# Patient Record
Sex: FEMALE | Race: WHITE
[De-identification: names, ages, dates, MRNs, and addresses within clinical notes are randomized per-mention and may not be internally consistent; named-entity substitution may affect disease eponyms.]

---

## 2018-04-20 ENCOUNTER — HOSPITAL ENCOUNTER (INPATIENT)
Dept: HOSPITAL 84 - D.ER | Age: 83
LOS: 2 days | Discharge: HOME HEALTH | DRG: 511 | End: 2018-04-22
Attending: FAMILY MEDICINE | Admitting: FAMILY MEDICINE
Payer: MEDICARE

## 2018-04-20 VITALS
BODY MASS INDEX: 28.73 KG/M2 | BODY MASS INDEX: 28.73 KG/M2 | HEIGHT: 60 IN | WEIGHT: 146.31 LBS | WEIGHT: 146.31 LBS | HEIGHT: 60 IN

## 2018-04-20 VITALS — DIASTOLIC BLOOD PRESSURE: 52 MMHG | SYSTOLIC BLOOD PRESSURE: 178 MMHG

## 2018-04-20 DIAGNOSIS — I10: ICD-10-CM

## 2018-04-20 DIAGNOSIS — S12.201A: ICD-10-CM

## 2018-04-20 DIAGNOSIS — S00.03XA: ICD-10-CM

## 2018-04-20 DIAGNOSIS — S52.572A: Primary | ICD-10-CM

## 2018-04-20 DIAGNOSIS — S52.602A: ICD-10-CM

## 2018-04-20 DIAGNOSIS — E78.00: ICD-10-CM
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
## 2018-04-20 DIAGNOSIS — S42.445A: ICD-10-CM

## 2018-04-20 LAB
ALBUMIN SERPL-MCNC: 3.6 G/DL (ref 3.4–5)
ALP SERPL-CCNC: 70 U/L (ref 46–116)
ALT SERPL-CCNC: 23 U/L (ref 10–68)
ANION GAP SERPL CALC-SCNC: 14.5 MMOL/L (ref 8–16)
APTT BLD: 23.7 SECONDS (ref 22.8–39.4)
BASOPHILS NFR BLD AUTO: 0.6 % (ref 0–2)
BILIRUB SERPL-MCNC: 0.61 MG/DL (ref 0.2–1.3)
BUN SERPL-MCNC: 26 MG/DL (ref 7–18)
CALCIUM SERPL-MCNC: 9 MG/DL (ref 8.5–10.1)
CHLORIDE SERPL-SCNC: 102 MMOL/L (ref 98–107)
CO2 SERPL-SCNC: 27.4 MMOL/L (ref 21–32)
CREAT SERPL-MCNC: 1 MG/DL (ref 0.6–1.3)
EOSINOPHIL NFR BLD: 1.7 % (ref 0–7)
ERYTHROCYTE [DISTWIDTH] IN BLOOD BY AUTOMATED COUNT: 13.4 % (ref 11.5–14.5)
GLOBULIN SER-MCNC: 4 G/L
GLUCOSE SERPL-MCNC: 93 MG/DL (ref 74–106)
HCT VFR BLD CALC: 41.2 % (ref 36–48)
HGB BLD-MCNC: 13.5 G/DL (ref 12–16)
IMM GRANULOCYTES NFR BLD: 0.2 % (ref 0–5)
INR PPP: 0.98 (ref 0.85–1.17)
LYMPHOCYTES NFR BLD AUTO: 26 % (ref 15–50)
MCH RBC QN AUTO: 31.1 PG (ref 26–34)
MCHC RBC AUTO-ENTMCNC: 32.8 G/DL (ref 31–37)
MCV RBC: 94.9 FL (ref 80–100)
MONOCYTES NFR BLD: 11.2 % (ref 2–11)
NEUTROPHILS NFR BLD AUTO: 60.3 % (ref 40–80)
OSMOLALITY SERPL CALC.SUM OF ELEC: 282 MOSM/KG (ref 275–300)
PLATELET # BLD: 200 10X3/UL (ref 130–400)
PMV BLD AUTO: 10.3 FL (ref 7.4–10.4)
POTASSIUM SERPL-SCNC: 4.9 MMOL/L (ref 3.5–5.1)
PROT SERPL-MCNC: 7.6 G/DL (ref 6.4–8.2)
PROTHROMBIN TIME: 12.6 SECONDS (ref 11.6–15)
RBC # BLD AUTO: 4.34 10X6/UL (ref 4–5.4)
SODIUM SERPL-SCNC: 139 MMOL/L (ref 136–145)
WBC # BLD AUTO: 6.6 10X3/UL (ref 4.8–10.8)

## 2018-04-21 VITALS — SYSTOLIC BLOOD PRESSURE: 118 MMHG | DIASTOLIC BLOOD PRESSURE: 52 MMHG

## 2018-04-21 VITALS — SYSTOLIC BLOOD PRESSURE: 154 MMHG | DIASTOLIC BLOOD PRESSURE: 65 MMHG

## 2018-04-21 VITALS — SYSTOLIC BLOOD PRESSURE: 160 MMHG | DIASTOLIC BLOOD PRESSURE: 46 MMHG

## 2018-04-21 VITALS — SYSTOLIC BLOOD PRESSURE: 133 MMHG | DIASTOLIC BLOOD PRESSURE: 64 MMHG

## 2018-04-21 VITALS — SYSTOLIC BLOOD PRESSURE: 125 MMHG | DIASTOLIC BLOOD PRESSURE: 57 MMHG

## 2018-04-21 VITALS — SYSTOLIC BLOOD PRESSURE: 139 MMHG | DIASTOLIC BLOOD PRESSURE: 87 MMHG

## 2018-04-21 VITALS — SYSTOLIC BLOOD PRESSURE: 127 MMHG | DIASTOLIC BLOOD PRESSURE: 55 MMHG

## 2018-04-21 VITALS — SYSTOLIC BLOOD PRESSURE: 122 MMHG | DIASTOLIC BLOOD PRESSURE: 46 MMHG

## 2018-04-21 VITALS — SYSTOLIC BLOOD PRESSURE: 130 MMHG | DIASTOLIC BLOOD PRESSURE: 55 MMHG

## 2018-04-21 VITALS — SYSTOLIC BLOOD PRESSURE: 156 MMHG | DIASTOLIC BLOOD PRESSURE: 69 MMHG

## 2018-04-21 VITALS — DIASTOLIC BLOOD PRESSURE: 80 MMHG | SYSTOLIC BLOOD PRESSURE: 140 MMHG

## 2018-04-21 LAB
ALBUMIN SERPL-MCNC: 3.3 G/DL (ref 3.4–5)
ALP SERPL-CCNC: 70 U/L (ref 46–116)
ALT SERPL-CCNC: 19 U/L (ref 10–68)
ANION GAP SERPL CALC-SCNC: 12.4 MMOL/L (ref 8–16)
APPEARANCE UR: CLEAR
BASOPHILS NFR BLD AUTO: 0.2 % (ref 0–2)
BILIRUB SERPL-MCNC: 0.8 MG/DL (ref 0.2–1.3)
BILIRUB SERPL-MCNC: NEGATIVE MG/DL
BUN SERPL-MCNC: 22 MG/DL (ref 7–18)
CALCIUM SERPL-MCNC: 8.8 MG/DL (ref 8.5–10.1)
CHLORIDE SERPL-SCNC: 104 MMOL/L (ref 98–107)
CO2 SERPL-SCNC: 27.6 MMOL/L (ref 21–32)
COLOR UR: YELLOW
CREAT SERPL-MCNC: 1 MG/DL (ref 0.6–1.3)
EOSINOPHIL NFR BLD: 0.8 % (ref 0–7)
ERYTHROCYTE [DISTWIDTH] IN BLOOD BY AUTOMATED COUNT: 13.4 % (ref 11.5–14.5)
GLOBULIN SER-MCNC: 3.5 G/L
GLUCOSE SERPL-MCNC: 105 MG/DL (ref 74–106)
GLUCOSE SERPL-MCNC: NEGATIVE MG/DL
HCT VFR BLD CALC: 39.7 % (ref 36–48)
HGB BLD-MCNC: 13 G/DL (ref 12–16)
IMM GRANULOCYTES NFR BLD: 0.2 % (ref 0–5)
KETONES UR STRIP-MCNC: (no result) MG/DL
LYMPHOCYTES NFR BLD AUTO: 16.1 % (ref 15–50)
MCH RBC QN AUTO: 30.8 PG (ref 26–34)
MCHC RBC AUTO-ENTMCNC: 32.7 G/DL (ref 31–37)
MCV RBC: 94.1 FL (ref 80–100)
MONOCYTES NFR BLD: 8.6 % (ref 2–11)
NEUTROPHILS NFR BLD AUTO: 74.1 % (ref 40–80)
NITRITE UR-MCNC: NEGATIVE MG/ML
OSMOLALITY SERPL CALC.SUM OF ELEC: 281 MOSM/KG (ref 275–300)
PH UR STRIP: 6 [PH] (ref 5–6)
PLATELET # BLD: 153 10X3/UL (ref 130–400)
PMV BLD AUTO: 9 FL (ref 7.4–10.4)
POTASSIUM SERPL-SCNC: 4 MMOL/L (ref 3.5–5.1)
PROT SERPL-MCNC: 6.8 G/DL (ref 6.4–8.2)
PROT UR-MCNC: NEGATIVE MG/DL
RBC # BLD AUTO: 4.22 10X6/UL (ref 4–5.4)
SODIUM SERPL-SCNC: 140 MMOL/L (ref 136–145)
SP GR UR STRIP: 1.01 (ref 1–1.02)
UROBILINOGEN UR-MCNC: NORMAL MG/DL
WBC # BLD AUTO: 9.5 10X3/UL (ref 4.8–10.8)

## 2018-04-21 PROCEDURE — 0PSJ35Z REPOSITION LEFT RADIUS WITH EXTERNAL FIXATION DEVICE, PERCUTANEOUS APPROACH: ICD-10-PCS | Performed by: ORTHOPAEDIC SURGERY

## 2018-04-21 PROCEDURE — 0PSL35Z REPOSITION LEFT ULNA WITH EXTERNAL FIXATION DEVICE, PERCUTANEOUS APPROACH: ICD-10-PCS | Performed by: ORTHOPAEDIC SURGERY

## 2018-04-22 VITALS — SYSTOLIC BLOOD PRESSURE: 129 MMHG | DIASTOLIC BLOOD PRESSURE: 55 MMHG

## 2018-04-22 VITALS — SYSTOLIC BLOOD PRESSURE: 129 MMHG | DIASTOLIC BLOOD PRESSURE: 58 MMHG

## 2018-04-22 VITALS — DIASTOLIC BLOOD PRESSURE: 57 MMHG | SYSTOLIC BLOOD PRESSURE: 150 MMHG

## 2018-04-22 VITALS — SYSTOLIC BLOOD PRESSURE: 131 MMHG | DIASTOLIC BLOOD PRESSURE: 53 MMHG

## 2018-04-22 LAB
ALBUMIN SERPL-MCNC: 2.9 G/DL (ref 3.4–5)
ALP SERPL-CCNC: 62 U/L (ref 46–116)
ALT SERPL-CCNC: 15 U/L (ref 10–68)
ANION GAP SERPL CALC-SCNC: 12.9 MMOL/L (ref 8–16)
BASOPHILS NFR BLD AUTO: 0.2 % (ref 0–2)
BILIRUB SERPL-MCNC: 0.82 MG/DL (ref 0.2–1.3)
BUN SERPL-MCNC: 20 MG/DL (ref 7–18)
CALCIUM SERPL-MCNC: 8.4 MG/DL (ref 8.5–10.1)
CHLORIDE SERPL-SCNC: 103 MMOL/L (ref 98–107)
CO2 SERPL-SCNC: 25.2 MMOL/L (ref 21–32)
CREAT SERPL-MCNC: 1 MG/DL (ref 0.6–1.3)
EOSINOPHIL NFR BLD: 0.6 % (ref 0–7)
ERYTHROCYTE [DISTWIDTH] IN BLOOD BY AUTOMATED COUNT: 13.4 % (ref 11.5–14.5)
GLOBULIN SER-MCNC: 3.4 G/L
GLUCOSE SERPL-MCNC: 116 MG/DL (ref 74–106)
HCT VFR BLD CALC: 36.1 % (ref 36–48)
HGB BLD-MCNC: 11.9 G/DL (ref 12–16)
IMM GRANULOCYTES NFR BLD: 0.2 % (ref 0–5)
LYMPHOCYTES NFR BLD AUTO: 11.4 % (ref 15–50)
MCH RBC QN AUTO: 31 PG (ref 26–34)
MCHC RBC AUTO-ENTMCNC: 33 G/DL (ref 31–37)
MCV RBC: 94 FL (ref 80–100)
MONOCYTES NFR BLD: 10 % (ref 2–11)
NEUTROPHILS NFR BLD AUTO: 77.6 % (ref 40–80)
OSMOLALITY SERPL CALC.SUM OF ELEC: 277 MOSM/KG (ref 275–300)
PLATELET # BLD: 141 10X3/UL (ref 130–400)
PMV BLD AUTO: 9.1 FL (ref 7.4–10.4)
POTASSIUM SERPL-SCNC: 4.1 MMOL/L (ref 3.5–5.1)
PROT SERPL-MCNC: 6.3 G/DL (ref 6.4–8.2)
RBC # BLD AUTO: 3.84 10X6/UL (ref 4–5.4)
SODIUM SERPL-SCNC: 137 MMOL/L (ref 136–145)
WBC # BLD AUTO: 9.6 10X3/UL (ref 4.8–10.8)

## 2018-05-04 ENCOUNTER — HOSPITAL ENCOUNTER (OUTPATIENT)
Dept: HOSPITAL 84 - D.US | Age: 83
Discharge: HOME | End: 2018-05-04
Attending: NURSE PRACTITIONER
Payer: MEDICARE

## 2018-05-04 VITALS — BODY MASS INDEX: 28.5 KG/M2

## 2018-05-04 DIAGNOSIS — R22.42: Primary | ICD-10-CM

## 2018-05-24 LAB
ANION GAP SERPL CALC-SCNC: 11.9 MMOL/L (ref 8–16)
BASOPHILS NFR BLD AUTO: 0.2 % (ref 0–2)
BUN SERPL-MCNC: 22 MG/DL (ref 7–18)
CALCIUM SERPL-MCNC: 9.2 MG/DL (ref 8.5–10.1)
CHLORIDE SERPL-SCNC: 103 MMOL/L (ref 98–107)
CO2 SERPL-SCNC: 28 MMOL/L (ref 21–32)
CREAT SERPL-MCNC: 1.2 MG/DL (ref 0.6–1.3)
EOSINOPHIL NFR BLD: 1.3 % (ref 0–7)
ERYTHROCYTE [DISTWIDTH] IN BLOOD BY AUTOMATED COUNT: 12.9 % (ref 11.5–14.5)
GLUCOSE SERPL-MCNC: 107 MG/DL (ref 74–106)
HCT VFR BLD CALC: 37.1 % (ref 36–48)
HGB BLD-MCNC: 12.2 G/DL (ref 12–16)
IMM GRANULOCYTES NFR BLD: 0.2 % (ref 0–5)
LYMPHOCYTES NFR BLD AUTO: 17.1 % (ref 15–50)
MCH RBC QN AUTO: 30.2 PG (ref 26–34)
MCHC RBC AUTO-ENTMCNC: 32.9 G/DL (ref 31–37)
MCV RBC: 91.8 FL (ref 80–100)
MONOCYTES NFR BLD: 10.1 % (ref 2–11)
NEUTROPHILS NFR BLD AUTO: 71.1 % (ref 40–80)
OSMOLALITY SERPL CALC.SUM OF ELEC: 280 MOSM/KG (ref 275–300)
PLATELET # BLD: 210 10X3/UL (ref 130–400)
PMV BLD AUTO: 9.3 FL (ref 7.4–10.4)
POTASSIUM SERPL-SCNC: 3.9 MMOL/L (ref 3.5–5.1)
RBC # BLD AUTO: 4.04 10X6/UL (ref 4–5.4)
SODIUM SERPL-SCNC: 139 MMOL/L (ref 136–145)
WBC # BLD AUTO: 9.4 10X3/UL (ref 4.8–10.8)

## 2018-05-25 ENCOUNTER — HOSPITAL ENCOUNTER (OUTPATIENT)
Dept: HOSPITAL 84 - D.OPS | Age: 83
Discharge: HOME | End: 2018-05-25
Attending: ORTHOPAEDIC SURGERY
Payer: MEDICARE

## 2018-05-25 VITALS
HEIGHT: 62 IN | BODY MASS INDEX: 26.68 KG/M2 | BODY MASS INDEX: 26.68 KG/M2 | WEIGHT: 145 LBS | HEIGHT: 62 IN | WEIGHT: 145 LBS

## 2018-05-25 VITALS — DIASTOLIC BLOOD PRESSURE: 70 MMHG | SYSTOLIC BLOOD PRESSURE: 131 MMHG

## 2018-05-25 DIAGNOSIS — M25.532: ICD-10-CM

## 2018-05-25 DIAGNOSIS — I10: ICD-10-CM

## 2018-05-25 DIAGNOSIS — S52.512D: Primary | ICD-10-CM
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
## 2018-05-25 DIAGNOSIS — Z01.812: ICD-10-CM

## 2020-07-01 ENCOUNTER — OFFICE VISIT (OUTPATIENT)
Dept: INTERNAL MEDICINE | Age: 85
End: 2020-07-01
Payer: MEDICARE

## 2020-07-01 VITALS
HEART RATE: 64 BPM | SYSTOLIC BLOOD PRESSURE: 139 MMHG | DIASTOLIC BLOOD PRESSURE: 66 MMHG | BODY MASS INDEX: 21.97 KG/M2 | HEIGHT: 63 IN | WEIGHT: 124 LBS

## 2020-07-01 PROBLEM — F41.9 ANXIETY: Status: ACTIVE | Noted: 2020-07-01

## 2020-07-01 PROBLEM — F33.9 EPISODE OF RECURRENT MAJOR DEPRESSIVE DISORDER (HCC): Status: ACTIVE | Noted: 2020-07-01

## 2020-07-01 PROBLEM — I10 ESSENTIAL HYPERTENSION: Status: ACTIVE | Noted: 2020-07-01

## 2020-07-01 PROBLEM — Z79.01 CURRENT USE OF LONG TERM ANTICOAGULATION: Status: ACTIVE | Noted: 2020-07-01

## 2020-07-01 PROBLEM — F01.50 VASCULAR DEMENTIA (HCC): Status: ACTIVE | Noted: 2020-07-01

## 2020-07-01 PROBLEM — Z86.718 HISTORY OF DVT (DEEP VEIN THROMBOSIS): Status: ACTIVE | Noted: 2020-07-01

## 2020-07-01 PROCEDURE — 4040F PNEUMOC VAC/ADMIN/RCVD: CPT | Performed by: NURSE PRACTITIONER

## 2020-07-01 PROCEDURE — G8421 BMI NOT CALCULATED: HCPCS | Performed by: NURSE PRACTITIONER

## 2020-07-01 PROCEDURE — 1036F TOBACCO NON-USER: CPT | Performed by: NURSE PRACTITIONER

## 2020-07-01 PROCEDURE — G8427 DOCREV CUR MEDS BY ELIG CLIN: HCPCS | Performed by: NURSE PRACTITIONER

## 2020-07-01 PROCEDURE — 1123F ACP DISCUSS/DSCN MKR DOCD: CPT | Performed by: NURSE PRACTITIONER

## 2020-07-01 PROCEDURE — 1090F PRES/ABSN URINE INCON ASSESS: CPT | Performed by: NURSE PRACTITIONER

## 2020-07-01 PROCEDURE — 99204 OFFICE O/P NEW MOD 45 MIN: CPT | Performed by: NURSE PRACTITIONER

## 2020-07-01 RX ORDER — LORAZEPAM 0.5 MG/1
0.5 TABLET ORAL NIGHTLY
Qty: 30 TABLET | Refills: 0 | Status: SHIPPED | OUTPATIENT
Start: 2020-07-01 | End: 2020-08-07

## 2020-07-01 RX ORDER — ESCITALOPRAM OXALATE 10 MG/1
10 TABLET ORAL DAILY
Qty: 30 TABLET | Refills: 3 | Status: SHIPPED | OUTPATIENT
Start: 2020-07-01 | End: 2020-08-31 | Stop reason: SDUPTHER

## 2020-07-01 RX ORDER — LISINOPRIL 10 MG/1
10 TABLET ORAL DAILY
COMMUNITY
End: 2020-07-01 | Stop reason: SDUPTHER

## 2020-07-01 RX ORDER — ALPRAZOLAM 0.5 MG/1
0.5 TABLET ORAL 2 TIMES DAILY
COMMUNITY
End: 2020-07-01

## 2020-07-01 RX ORDER — FUROSEMIDE 40 MG/1
40 TABLET ORAL 2 TIMES DAILY
Qty: 180 TABLET | Refills: 2 | Status: SHIPPED | OUTPATIENT
Start: 2020-07-01 | End: 2020-07-10 | Stop reason: SDUPTHER

## 2020-07-01 RX ORDER — DONEPEZIL HYDROCHLORIDE 10 MG/1
10 TABLET, FILM COATED ORAL NIGHTLY
Qty: 90 TABLET | Refills: 1 | Status: SHIPPED | OUTPATIENT
Start: 2020-07-01 | End: 2020-07-10 | Stop reason: SDUPTHER

## 2020-07-01 RX ORDER — LISINOPRIL 10 MG/1
10 TABLET ORAL DAILY
Qty: 90 TABLET | Refills: 2 | Status: SHIPPED | OUTPATIENT
Start: 2020-07-01 | End: 2020-07-10 | Stop reason: SDUPTHER

## 2020-07-01 RX ORDER — FUROSEMIDE 40 MG/1
40 TABLET ORAL 2 TIMES DAILY
COMMUNITY
End: 2020-07-01 | Stop reason: SDUPTHER

## 2020-07-01 SDOH — HEALTH STABILITY: MENTAL HEALTH: HOW OFTEN DO YOU HAVE A DRINK CONTAINING ALCOHOL?: NEVER

## 2020-07-01 ASSESSMENT — PATIENT HEALTH QUESTIONNAIRE - PHQ9
6. FEELING BAD ABOUT YOURSELF - OR THAT YOU ARE A FAILURE OR HAVE LET YOURSELF OR YOUR FAMILY DOWN: 0
1. LITTLE INTEREST OR PLEASURE IN DOING THINGS: 3
SUM OF ALL RESPONSES TO PHQ9 QUESTIONS 1 & 2: 6
3. TROUBLE FALLING OR STAYING ASLEEP: 0
5. POOR APPETITE OR OVEREATING: 0
2. FEELING DOWN, DEPRESSED OR HOPELESS: 3
4. FEELING TIRED OR HAVING LITTLE ENERGY: 2
SUM OF ALL RESPONSES TO PHQ QUESTIONS 1-9: 8
10. IF YOU CHECKED OFF ANY PROBLEMS, HOW DIFFICULT HAVE THESE PROBLEMS MADE IT FOR YOU TO DO YOUR WORK, TAKE CARE OF THINGS AT HOME, OR GET ALONG WITH OTHER PEOPLE: 0
SUM OF ALL RESPONSES TO PHQ QUESTIONS 1-9: 8
9. THOUGHTS THAT YOU WOULD BE BETTER OFF DEAD, OR OF HURTING YOURSELF: 0
7. TROUBLE CONCENTRATING ON THINGS, SUCH AS READING THE NEWSPAPER OR WATCHING TELEVISION: 0
8. MOVING OR SPEAKING SO SLOWLY THAT OTHER PEOPLE COULD HAVE NOTICED. OR THE OPPOSITE, BEING SO FIGETY OR RESTLESS THAT YOU HAVE BEEN MOVING AROUND A LOT MORE THAN USUAL: 0

## 2020-07-01 ASSESSMENT — ENCOUNTER SYMPTOMS
WHEEZING: 0
STRIDOR: 0
ABDOMINAL DISTENTION: 0
SORE THROAT: 0
DIARRHEA: 0
CONSTIPATION: 0
NAUSEA: 0
EYE ITCHING: 0
VOMITING: 0
CHOKING: 0
COUGH: 0
EYE DISCHARGE: 0
TROUBLE SWALLOWING: 0
ABDOMINAL PAIN: 0
COLOR CHANGE: 0
SHORTNESS OF BREATH: 0
BLOOD IN STOOL: 0

## 2020-07-01 NOTE — PATIENT INSTRUCTIONS
1. Anxiety;  Use the lorazepam at bedtime   2. Depression;  Start lexapro 10 mg daily   3. Dementia; We will start aricept 10 mg   4. History of blood clots; Stable on eliquis; We will try to find the reason you are on the eliquis and maybe change to just asa 81 mg 1 or 2 daily   5. Hypertension;   Stable on lisinopril with lasix      Health maintenance we will need a bone density at some point but we will wait until COVID is calm down some and it is easier for her to get out

## 2020-07-01 NOTE — PROGRESS NOTES
30 tablet 0    lisinopril (PRINIVIL;ZESTRIL) 10 MG tablet Take 1 tablet by mouth daily 90 tablet 2    apixaban (ELIQUIS) 2.5 MG TABS tablet Take 1 tablet by mouth 2 times daily 180 tablet 2    furosemide (LASIX) 40 MG tablet Take 1 tablet by mouth 2 times daily 180 tablet 2    donepezil (ARICEPT) 10 MG tablet Take 1 tablet by mouth nightly 90 tablet 1    escitalopram (LEXAPRO) 10 MG tablet Take 1 tablet by mouth daily 30 tablet 3     No current facility-administered medications for this visit. No Known Allergies    Health Maintenance   Topic Date Due    Potassium monitoring  11/13/1928    Creatinine monitoring  11/13/1928    DTaP/Tdap/Td vaccine (1 - Tdap) 07/22/2020 (Originally 11/13/1947)    Shingles Vaccine (1 of 2) 07/01/2021 (Originally 11/13/1978)    Pneumococcal 65+ years Vaccine (1 of 1 - PPSV23) 07/15/2030 (Originally 11/13/1993)    Flu vaccine (1) 09/01/2020    Hepatitis A vaccine  Aged Out    Hepatitis B vaccine  Aged Out    Hib vaccine  Aged Out    Meningococcal (ACWY) vaccine  Aged Out       No results found for: LABA1C  No results found for: PSA, PSADIA  No results found for: TSH]  No results found for: NA, K, CL, CO2, BUN, CREATININE, GLUCOSE, CALCIUM, PROT, LABALBU, BILITOT, ALKPHOS, AST, ALT, LABGLOM, GFRAA, AGRATIO, GLOB  No results found for: CHOL  No results found for: TRIG  No results found for: HDL  No results found for: LDLCHOLESTEROL, LDLCALC  No results found for: NA, K, CL, CO2, BUN, CREATININE, GLUCOSE, CALCIUM   No results found for: WBC, HGB, HCT, MCV, PLT, LABLYMP, MID, GRAN, LYMPHOPCT, MIDPERCENT, GRANULOCYTES, RBC, MCH, MCHC, RDW  No results found for: VITD25    Subjective:      Review of Systems   Constitutional: Negative for fatigue, fever and unexpected weight change. HENT: Negative for ear discharge, ear pain, mouth sores, sore throat and trouble swallowing. Eyes: Negative for discharge, itching and visual disturbance.    Respiratory: Negative for cough, choking, shortness of breath, wheezing and stridor. Cardiovascular: Negative for chest pain, palpitations and leg swelling. Gastrointestinal: Negative for abdominal distention, abdominal pain, blood in stool, constipation, diarrhea, nausea and vomiting. Endocrine: Negative for cold intolerance, polydipsia and polyuria. Genitourinary: Negative for difficulty urinating, dysuria, frequency and urgency. Musculoskeletal: Negative for arthralgias and gait problem. Skin: Negative for color change and rash. Allergic/Immunologic: Negative for food allergies and immunocompromised state. Neurological: Negative for dizziness, tremors, syncope, speech difficulty, weakness and headaches. Memory loss     Hematological: Negative for adenopathy. Does not bruise/bleed easily. Psychiatric/Behavioral: Negative for confusion and hallucinations. Objective:     Physical Exam  Constitutional:       General: She is not in acute distress. Appearance: She is well-developed. HENT:      Head: Normocephalic and atraumatic. Eyes:      General: No scleral icterus. Right eye: No discharge. Left eye: No discharge. Pupils: Pupils are equal, round, and reactive to light. Neck:      Musculoskeletal: Normal range of motion and neck supple. Thyroid: No thyromegaly. Vascular: No JVD. Cardiovascular:      Rate and Rhythm: Normal rate and regular rhythm. Heart sounds: Normal heart sounds. No murmur. Pulmonary:      Effort: Pulmonary effort is normal. No respiratory distress. Breath sounds: Normal breath sounds. No wheezing or rales. Abdominal:      General: Bowel sounds are normal. There is no distension. Palpations: Abdomen is soft. There is no mass. Tenderness: There is no abdominal tenderness. There is no guarding or rebound. Musculoskeletal: Normal range of motion. General: No tenderness. Skin:     General: Skin is warm and dry.       Findings: No erythema or rash. Neurological:      Mental Status: She is alert and oriented to person, place, and time. Cranial Nerves: No cranial nerve deficit. Coordination: Coordination normal.      Deep Tendon Reflexes: Reflexes are normal and symmetric. Reflexes normal.   Psychiatric:         Mood and Affect: Mood is not depressed. Behavior: Behavior normal.         Thought Content: Thought content normal.         Judgment: Judgment normal.       /66   Pulse 64   Ht 5' 2.5\" (1.588 m)     Assessment:       Diagnosis Orders   1. Anxiety  LORazepam (ATIVAN) 0.5 MG tablet   2. Essential hypertension  CBC Auto Differential    Comprehensive Metabolic Panel    Lipid Panel    Urinalysis Reflex to Culture    TSH without Reflex   3. Moderate episode of recurrent major depressive disorder ([facility] [address]     4. Vascular dementia without behavioral disturbance ([facility] [address]     5. History of DVT (deep vein thrombosis)     6. Current use of long term anticoagulation     7. Health care maintenance  Vitamin D 25 Hydroxy   8. Disorder of bone, unspecified   Vitamin D 25 Hydroxy       Plan:        Patient given educational materials - see patient instructions. Discussed use, benefit, and side effects of prescribed medications. Allpatient questions answered. Pt voiced understanding. Reviewed health maintenance. Instructed to continue current medications, diet and exercise. Patient agreed with treatment plan. Follow up as directed. MEDICATIONS:  Orders Placed This Encounter   Medications    LORazepam (ATIVAN) 0.5 MG tablet     Sig: Take 1 tablet by mouth nightly for 30 days.      Dispense:  30 tablet     Refill:  0    lisinopril (PRINIVIL;ZESTRIL) 10 MG tablet     Sig: Take 1 tablet by mouth daily     Dispense:  90 tablet     Refill:  2    apixaban (ELIQUIS) 2.5 MG TABS tablet     Sig: Take 1 tablet by mouth 2 times daily     Dispense:  180 tablet     Refill:  2    furosemide (LASIX) 40 MG tablet     Sig: Take 1 tablet by mouth 2 times daily     Dispense:  180 tablet     Refill:  2    donepezil (ARICEPT) 10 MG tablet     Sig: Take 1 tablet by mouth nightly     Dispense:  90 tablet     Refill:  1    escitalopram (LEXAPRO) 10 MG tablet     Sig: Take 1 tablet by mouth daily     Dispense:  30 tablet     Refill:  3         ORDERS:  Orders Placed This Encounter   Procedures    CBC Auto Differential    Comprehensive Metabolic Panel    Lipid Panel    Vitamin D 25 Hydroxy    Urinalysis Reflex to Culture    TSH without Reflex       Follow-up:  Return in about 3 weeks (around 7/22/2020). PATIENT INSTRUCTIONS:  Patient Instructions   1. Anxiety;  Use the lorazepam at bedtime   2. Depression;  Start lexapro 10 mg daily   3. Dementia; We will start aricept 10 mg   4. History of blood clots; Stable on eliquis; We will try to find the reason you are on the eliquis and maybe change to just asa 81 mg 1 or 2 daily   5. Hypertension; Stable on lisinopril with lasix      Health maintenance we will need a bone density at some point but we will wait until COVID is calm down some and it is easier for her to get out    Electronically signed by GEOFF Russell on 7/1/2020 at 3:57 PM    EMRDragon/transcription disclaimer:  Much of this encounter note is electronic transcription/translation of spoken language to printed texts. The electronic translation of spoken language may be erroneous, or at times,nonsensical words or phrases may be inadvertently transcribed.   Although I have reviewed the note for such errors, some may still exist.

## 2020-07-07 ENCOUNTER — APPOINTMENT (OUTPATIENT)
Dept: CT IMAGING | Age: 85
End: 2020-07-07
Payer: MEDICARE

## 2020-07-07 ENCOUNTER — HOSPITAL ENCOUNTER (EMERGENCY)
Age: 85
Discharge: HOME | End: 2020-07-07
Attending: EMERGENCY MEDICINE
Payer: MEDICARE

## 2020-07-07 ENCOUNTER — APPOINTMENT (OUTPATIENT)
Dept: ULTRASOUND IMAGING | Age: 85
End: 2020-07-07
Payer: MEDICARE

## 2020-07-07 VITALS
WEIGHT: 125 LBS | HEIGHT: 62 IN | DIASTOLIC BLOOD PRESSURE: 76 MMHG | OXYGEN SATURATION: 99 % | BODY MASS INDEX: 23 KG/M2 | HEART RATE: 78 BPM | RESPIRATION RATE: 16 BRPM | TEMPERATURE: 98.7 F | SYSTOLIC BLOOD PRESSURE: 120 MMHG

## 2020-07-07 DIAGNOSIS — R10.9 ACUTE ABDOMINAL PAIN: Primary | ICD-10-CM

## 2020-07-07 DIAGNOSIS — N30.00 ACUTE CYSTITIS WITHOUT HEMATURIA: ICD-10-CM

## 2020-07-07 LAB
ALBUMIN SERPL-MCNC: 3.7 G/DL (ref 3.5–5.2)
ALP BLD-CCNC: 107 U/L (ref 35–104)
ALT SERPL-CCNC: 11 U/L (ref 5–33)
ANION GAP SERPL CALCULATED.3IONS-SCNC: 12 MMOL/L (ref 7–19)
AST SERPL-CCNC: 17 U/L (ref 5–32)
BACTERIA: ABNORMAL /HPF
BASOPHILS ABSOLUTE: 0.1 K/UL (ref 0–0.2)
BASOPHILS RELATIVE PERCENT: 0.4 % (ref 0–1)
BILIRUB SERPL-MCNC: 0.7 MG/DL (ref 0.2–1.2)
BILIRUBIN URINE: NEGATIVE
BLOOD, URINE: ABNORMAL
BUN BLDV-MCNC: 16 MG/DL (ref 8–23)
CALCIUM SERPL-MCNC: 9.2 MG/DL (ref 8.2–9.6)
CHLORIDE BLD-SCNC: 99 MMOL/L (ref 98–111)
CLARITY: ABNORMAL
CO2: 26 MMOL/L (ref 22–29)
COLOR: YELLOW
CREAT SERPL-MCNC: 0.7 MG/DL (ref 0.5–0.9)
EOSINOPHILS ABSOLUTE: 0 K/UL (ref 0–0.6)
EOSINOPHILS RELATIVE PERCENT: 0.1 % (ref 0–5)
EPITHELIAL CELLS, UA: ABNORMAL /HPF
GFR NON-AFRICAN AMERICAN: >60
GLUCOSE BLD-MCNC: 130 MG/DL (ref 74–109)
GLUCOSE URINE: NEGATIVE MG/DL
HCT VFR BLD CALC: 39.7 % (ref 37–47)
HEMOGLOBIN: 13.2 G/DL (ref 12–16)
IMMATURE GRANULOCYTES #: 0.1 K/UL
KETONES, URINE: NEGATIVE MG/DL
LEUKOCYTE ESTERASE, URINE: ABNORMAL
LIPASE: 24 U/L (ref 13–60)
LYMPHOCYTES ABSOLUTE: 1 K/UL (ref 1.1–4.5)
LYMPHOCYTES RELATIVE PERCENT: 8.5 % (ref 20–40)
MCH RBC QN AUTO: 30.8 PG (ref 27–31)
MCHC RBC AUTO-ENTMCNC: 33.2 G/DL (ref 33–37)
MCV RBC AUTO: 92.8 FL (ref 81–99)
MONOCYTES ABSOLUTE: 0.6 K/UL (ref 0–0.9)
MONOCYTES RELATIVE PERCENT: 4.9 % (ref 0–10)
NEUTROPHILS ABSOLUTE: 10 K/UL (ref 1.5–7.5)
NEUTROPHILS RELATIVE PERCENT: 85.7 % (ref 50–65)
NITRITE, URINE: POSITIVE
PDW BLD-RTO: 13.5 % (ref 11.5–14.5)
PH UA: 6.5 (ref 5–8)
PLATELET # BLD: 205 K/UL (ref 130–400)
PMV BLD AUTO: 9.2 FL (ref 9.4–12.3)
POTASSIUM SERPL-SCNC: 4.3 MMOL/L (ref 3.5–5)
PROTEIN UA: NEGATIVE MG/DL
RBC # BLD: 4.28 M/UL (ref 4.2–5.4)
RBC UA: ABNORMAL /HPF (ref 0–2)
SODIUM BLD-SCNC: 137 MMOL/L (ref 136–145)
SPECIFIC GRAVITY UA: 1.04 (ref 1–1.03)
TOTAL PROTEIN: 6.7 G/DL (ref 6.6–8.7)
TROPONIN: <0.01 NG/ML (ref 0–0.03)
UROBILINOGEN, URINE: 1 E.U./DL
WBC # BLD: 11.7 K/UL (ref 4.8–10.8)
WBC UA: ABNORMAL /HPF (ref 0–5)

## 2020-07-07 PROCEDURE — 74177 CT ABD & PELVIS W/CONTRAST: CPT

## 2020-07-07 PROCEDURE — 84484 ASSAY OF TROPONIN QUANT: CPT

## 2020-07-07 PROCEDURE — 96374 THER/PROPH/DIAG INJ IV PUSH: CPT

## 2020-07-07 PROCEDURE — 96375 TX/PRO/DX INJ NEW DRUG ADDON: CPT

## 2020-07-07 PROCEDURE — 6360000002 HC RX W HCPCS: Performed by: EMERGENCY MEDICINE

## 2020-07-07 PROCEDURE — 87086 URINE CULTURE/COLONY COUNT: CPT

## 2020-07-07 PROCEDURE — 6360000004 HC RX CONTRAST MEDICATION: Performed by: EMERGENCY MEDICINE

## 2020-07-07 PROCEDURE — 99284 EMERGENCY DEPT VISIT MOD MDM: CPT

## 2020-07-07 PROCEDURE — 96361 HYDRATE IV INFUSION ADD-ON: CPT

## 2020-07-07 PROCEDURE — 2580000003 HC RX 258: Performed by: EMERGENCY MEDICINE

## 2020-07-07 PROCEDURE — 76705 ECHO EXAM OF ABDOMEN: CPT

## 2020-07-07 PROCEDURE — 83690 ASSAY OF LIPASE: CPT

## 2020-07-07 PROCEDURE — 80053 COMPREHEN METABOLIC PANEL: CPT

## 2020-07-07 PROCEDURE — 87186 SC STD MICRODIL/AGAR DIL: CPT

## 2020-07-07 PROCEDURE — 81001 URINALYSIS AUTO W/SCOPE: CPT

## 2020-07-07 PROCEDURE — 85025 COMPLETE CBC W/AUTO DIFF WBC: CPT

## 2020-07-07 PROCEDURE — 93005 ELECTROCARDIOGRAM TRACING: CPT | Performed by: EMERGENCY MEDICINE

## 2020-07-07 PROCEDURE — 36415 COLL VENOUS BLD VENIPUNCTURE: CPT

## 2020-07-07 RX ORDER — 0.9 % SODIUM CHLORIDE 0.9 %
1000 INTRAVENOUS SOLUTION INTRAVENOUS ONCE
Status: COMPLETED | OUTPATIENT
Start: 2020-07-07 | End: 2020-07-07

## 2020-07-07 RX ORDER — MORPHINE SULFATE 4 MG/ML
4 INJECTION, SOLUTION INTRAMUSCULAR; INTRAVENOUS ONCE
Status: COMPLETED | OUTPATIENT
Start: 2020-07-07 | End: 2020-07-07

## 2020-07-07 RX ORDER — ONDANSETRON 2 MG/ML
4 INJECTION INTRAMUSCULAR; INTRAVENOUS ONCE
Status: COMPLETED | OUTPATIENT
Start: 2020-07-07 | End: 2020-07-07

## 2020-07-07 RX ORDER — CEFDINIR 300 MG/1
300 CAPSULE ORAL 2 TIMES DAILY
Qty: 14 CAPSULE | Refills: 0 | Status: SHIPPED | OUTPATIENT
Start: 2020-07-07 | End: 2020-07-14

## 2020-07-07 RX ADMIN — IOPAMIDOL 90 ML: 755 INJECTION, SOLUTION INTRAVENOUS at 09:20

## 2020-07-07 RX ADMIN — SODIUM CHLORIDE 1000 ML: 9 INJECTION, SOLUTION INTRAVENOUS at 08:55

## 2020-07-07 RX ADMIN — ONDANSETRON 4 MG: 2 INJECTION INTRAMUSCULAR; INTRAVENOUS at 08:55

## 2020-07-07 RX ADMIN — MORPHINE SULFATE 4 MG: 4 INJECTION INTRAVENOUS at 08:55

## 2020-07-07 RX ADMIN — CEFTRIAXONE 1 G: 1 INJECTION, POWDER, FOR SOLUTION INTRAMUSCULAR; INTRAVENOUS at 11:22

## 2020-07-07 ASSESSMENT — ENCOUNTER SYMPTOMS
SHORTNESS OF BREATH: 0
DIARRHEA: 0
SORE THROAT: 0
BACK PAIN: 0
ABDOMINAL PAIN: 1
VOMITING: 0
COUGH: 0
NAUSEA: 0
RHINORRHEA: 0

## 2020-07-07 ASSESSMENT — PAIN DESCRIPTION - PAIN TYPE: TYPE: ACUTE PAIN

## 2020-07-07 NOTE — ED PROVIDER NOTES
[facility] EMERGENCY DEPT  eMERGENCY dEPARTMENT eNCOUnter      Pt Name: Lizbeth Croft  MRN: [id number]  Gissellegfana maria 11/13/1928  Date of evaluation: 7/7/2020  Provider: Michell Martinez MD    [address]       Chief Complaint   Patient presents with    Abdominal Pain         HISTORY OF PRESENT ILLNESS   (Location/Symptom, Timing/Onset,Context/Setting, Quality, Duration, Modifying Factors, Severity)  Note limiting factors. Lizbeth Croft is a 80 y.o. female who presents to the emergency department for abdominal pain. The patient tells me that she did not feel too well overnight but then developed crampy abdominal pain quite acutely this morning around 7 AM.  She tells me \"I am just sick\". She has not had any vomiting or diarrhea. Did have a normal bowel movement yesterday. Has not noticed any blood in her stool. Denies any chest pain or shortness of breath. She has had a prior hysterectomy and does not believe she has had any other abdominal surgeries. No fevers or chills. Daughter at bedside. HPI    NursingNotes were reviewed. REVIEW OF SYSTEMS    (2-9 systems for level 4, 10 or more for level 5)     Review of Systems   Constitutional: Negative for chills and fever. HENT: Negative for rhinorrhea and sore throat. Respiratory: Negative for cough and shortness of breath. Cardiovascular: Negative for chest pain and leg swelling. Gastrointestinal: Positive for abdominal pain. Negative for diarrhea, nausea and vomiting. Genitourinary: Negative for dysuria, frequency, hematuria and urgency. Musculoskeletal: Negative for back pain and neck pain. Neurological: Negative for dizziness and headaches. All other systems reviewed and are negative.            PAST MEDICALHISTORY     Past Medical History:   Diagnosis Date    Anxiety     Depression     Hx of blood clots     Hypertension          SURGICAL HISTORY       Past Surgical History:   Procedure Laterality Date    APPENDECTOMY      ARM SURGERY  HYSTERECTOMY, TOTAL ABDOMINAL      LEG SURGERY           CURRENT MEDICATIONS     Discharge Medication List as of 7/7/2020 11:15 AM      CONTINUE these medications which have NOT CHANGED    Details   LORazepam (ATIVAN) 0.5 MG tablet Take 1 tablet by mouth nightly for 30 days. , Disp-30 tablet, R-0Normal      lisinopril (PRINIVIL;ZESTRIL) 10 MG tablet Take 1 tablet by mouth daily, Disp-90 tablet, R-2Normal      apixaban (ELIQUIS) 2.5 MG TABS tablet Take 1 tablet by mouth 2 times daily, Disp-180 tablet, R-2Normal      furosemide (LASIX) 40 MG tablet Take 1 tablet by mouth 2 times daily, Disp-180 tablet, R-2Normal      donepezil (ARICEPT) 10 MG tablet Take 1 tablet by mouth nightly, Disp-90 tablet, R-1Normal      escitalopram (LEXAPRO) 10 MG tablet Take 1 tablet by mouth daily, Disp-30 tablet, R-3Normal             ALLERGIES     Patient has no known allergies. FAMILY HISTORY       Family History   Family history unknown: Yes          SOCIAL HISTORY       Social History     Socioeconomic History    Marital status:       Spouse name: None    Number of children: None    Years of education: None    Highest education level: None   Occupational History    None   Social Needs    Financial resource strain: None    Food insecurity     Worry: None     Inability: None    Transportation needs     Medical: None     Non-medical: None   Tobacco Use    Smoking status: Never Smoker    Smokeless tobacco: Never Used   Substance and Sexual Activity    Alcohol use: Never     Frequency: Never    Drug use: Never    Sexual activity: None   Lifestyle    Physical activity     Days per week: None     Minutes per session: None    Stress: None   Relationships    Social connections     Talks on phone: None     Gets together: None     Attends Restoration service: None     Active member of club or organization: None     Attends meetings of clubs or organizations: None     Relationship status: None    Intimate partner violence     Fear of current or ex partner: None     Emotionally abused: None     Physically abused: None     Forced sexual activity: None   Other Topics Concern    None   Social History Narrative    None       SCREENINGS    Valerio Coma Scale  Eye Opening: Spontaneous  Best Verbal Response: Oriented  Best Motor Response: Obeys commands  Valerio Coma Scale Score: 15        PHYSICAL EXAM    (up to 7 for level 4, 8 or more for level 5)     ED Triage Vitals [07/07/20 0830]   BP Temp Temp src Pulse Resp SpO2 Height Weight   (!) 151/56 97.8 °F (36.6 °C) -- 56 18 96 % 5' 2\" (1.575 m) 125 lb (56.7 kg)       Physical Exam  Vitals signs and nursing note reviewed. Constitutional:       General: She is in acute distress. Appearance: Normal appearance. She is well-developed. She is not ill-appearing, toxic-appearing or diaphoretic. HENT:      Head: Normocephalic and atraumatic. Right Ear: External ear normal.      Left Ear: External ear normal.      Nose: Nose normal.   Eyes:      Conjunctiva/sclera: Conjunctivae normal.   Neck:      Musculoskeletal: Normal range of motion. Trachea: No tracheal deviation. Cardiovascular:      Rate and Rhythm: Normal rate and regular rhythm. Heart sounds: Normal heart sounds. No murmur. Pulmonary:      Effort: No respiratory distress. Breath sounds: Normal breath sounds. No wheezing or rales. Abdominal:      General: Abdomen is flat. There is no distension. Palpations: Abdomen is soft. There is no mass. Tenderness: There is generalized abdominal tenderness. There is no right CVA tenderness or left CVA tenderness. Comments: Seems somewhat more localized to epigastric/upper abdomen but diffusely ttp   Musculoskeletal: Normal range of motion. Skin:     General: Skin is warm and dry. Neurological:      Mental Status: She is alert. Mental status is at baseline. GCS: GCS eye subscore is 4. GCS verbal subscore is 5. GCS motor subscore is 6. DIAGNOSTIC RESULTS     EKG: All EKG's areinterpreted by the Emergency Department Physician who either signs or Co-signs this chart in the absence of a cardiologist.    74 normal sinus rhythm, no ST changes nondiagnostic EKG    RADIOLOGY:  Non-plain film images such as CT, Ultrasound and MRI are read by the radiologist. Plain radiographic images are visualized and preliminarily interpreted bythe emergency physician with the below findings:      [address]   Final Result   Mild dilation of the common bile duct without evidence of obstructing   stone or mass. Normal appearance of the gallbladder   Signed by Dr Mitchell Ball on 7/7/2020 11:10 AM      CT ABDOMEN PELVIS W IV CONTRAST Additional Contrast? None   Final Result   A Nonobstructing right femoral hernia containing loops of   small bowel. The diverticulosis of the distal colon. No evidence for   diverticulitis. Moderately distended gallbladder without gallstones. A mild dilatation   of the common bile duct. The etiology is not established. Low-density nodules in the spleen. The etiology and clinical   significance is not certain. Above findings are recorded on a digital voice clip in PACS.    Signed by Dr Jean Paul Thomas on 7/7/2020 10:07 AM              LABS:  Labs Reviewed   CBC WITH AUTO DIFFERENTIAL - Abnormal; Notable for the following components:       Result Value    WBC 11.7 (*)     MPV 9.2 (*)     Neutrophils % 85.7 (*)     Lymphocytes % 8.5 (*)     Neutrophils Absolute 10.0 (*)     Lymphocytes Absolute 1.0 (*)     All other components within normal limits   COMPREHENSIVE METABOLIC PANEL - Abnormal; Notable for the following components:    Glucose 130 (*)     Alkaline Phosphatase 107 (*)     All other components within normal limits   URINE RT REFLEX TO CULTURE - Abnormal; Notable for the following components:    Clarity, UA CLOUDY (*)     Blood, Urine TRACE (*)     Nitrite, Urine POSITIVE (*)     Leukocyte Esterase, Urine SMALL (*)     All other components within normal limits   MICROSCOPIC URINALYSIS - Abnormal; Notable for the following components:    WBC, UA 21-30 (*)     Bacteria, UA 4+ (*)     All other components within normal limits   CULTURE, URINE   LIPASE   TROPONIN       All other labs were within normal range or not returned as of this dictation. EMERGENCY DEPARTMENT COURSE and DIFFERENTIAL DIAGNOSIS/MDM:   Vitals:    Vitals:    07/07/20 0830 07/07/20 1015 07/07/20 1133   BP: (!) 151/56 (!) 145/76 120/76   Pulse: 56 78 78   Resp: 18 20 16   Temp: 97.8 °F (36.6 °C) 98.7 °F (37.1 °C) 98.7 °F (37.1 °C)   TempSrc:  Temporal Temporal   SpO2: 96% 99% 99%   Weight: 125 lb (56.7 kg)     Height: 5' 2\" (1.575 m)         MDM  Number of Diagnoses or Management Options     Amount and/or Complexity of Data Reviewed  Clinical lab tests: ordered and reviewed  Tests in the radiology section of CPT®: ordered and reviewed  Independent visualization of images, tracings, or specimens: yes      Pain on exam diffuse but seemed more upper abdomen, CT read noted, felt due to age needed further eval so did check u/s as well however neg, pt feeling much better, abd benign on repeat exam after treating pain, does have UTI, stable for DC, understands follow up and return precautions      CONSULTS:  None    PROCEDURES:  Unless otherwise noted below, none     Procedures    FINAL IMPRESSION      1. Acute abdominal pain    2.  Acute cystitis without hematuria          DISPOSITION/PLAN   DISPOSITION        PATIENT REFERRED TO:  Lori Jamison, [address] [facility] [address]  [phone]    Schedule an appointment as soon as possible for a visit in 2 days        DISCHARGE MEDICATIONS:  Discharge Medication List as of 7/7/2020 11:15 AM      START taking these medications    Details   cefdinir (OMNICEF) 300 MG capsule Take 1 capsule by mouth 2 times daily for 7 days, Disp-14 capsule, R-0Print                (Please note that portions of this note were completed with a voice recognition program.  Efforts were made to edit thedictations but occasionally words are mis-transcribed.)    Leonidas Hu MD (electronically signed)  Attending Emergency Physician        Miko Kimble MD  07/07/20 7019

## 2020-07-08 LAB
EKG P AXIS: 21 DEGREES
EKG P-R INTERVAL: 178 MS
EKG Q-T INTERVAL: 388 MS
EKG QRS DURATION: 92 MS
EKG QTC CALCULATION (BAZETT): 409 MS
EKG T AXIS: 68 DEGREES

## 2020-07-08 PROCEDURE — 93010 ELECTROCARDIOGRAM REPORT: CPT | Performed by: INTERNAL MEDICINE

## 2020-07-09 LAB
ORGANISM: ABNORMAL
URINE CULTURE, ROUTINE: ABNORMAL
URINE CULTURE, ROUTINE: ABNORMAL

## 2020-07-10 ENCOUNTER — TELEPHONE (OUTPATIENT)
Dept: INTERNAL MEDICINE | Age: 85
End: 2020-07-10

## 2020-07-10 RX ORDER — FUROSEMIDE 40 MG/1
40 TABLET ORAL 2 TIMES DAILY
Qty: 60 TABLET | Refills: 0 | Status: SHIPPED | OUTPATIENT
Start: 2020-07-10 | End: 2020-08-31 | Stop reason: SDUPTHER

## 2020-07-10 RX ORDER — LISINOPRIL 10 MG/1
10 TABLET ORAL DAILY
Qty: 30 TABLET | Refills: 0 | Status: ON HOLD | OUTPATIENT
Start: 2020-07-10 | End: 2020-08-13 | Stop reason: HOSPADM

## 2020-07-10 RX ORDER — DONEPEZIL HYDROCHLORIDE 10 MG/1
10 TABLET, FILM COATED ORAL NIGHTLY
Qty: 30 TABLET | Refills: 0 | Status: SHIPPED | OUTPATIENT
Start: 2020-07-10 | End: 2020-08-31 | Stop reason: SDUPTHER

## 2020-07-10 NOTE — TELEPHONE ENCOUNTER
Daughter states that [facility] didn't get the rxs that Carmen sent in on 7/1/20. Lexapro and Lorazepam was sent to [facility]. The daughter said she is out of all of her meds. I called [phone] to check on this. I spoke with Linnea Berg who said she cannot find this member in their system. She said \"their\" ID numbers start with an \"R\". The only ID numbers in her chart are the Medicare and Lily Garcia. I advised the daughter that she is going to have to call the insurance to see what is going on, but in the meantime we will send in a month supply to [facility].

## 2020-07-14 NOTE — PROGRESS NOTES
Called her daughter and she did have cefdinir for E. coli UTI which was susceptible. Her daughter is also going to call the clinic where she came from in [place] to see if she can find out why her mom is on Eliquis. Was it just because a history of DVT and if so how long ago was that clot and is a recurrent are isolated event.

## 2020-07-21 RX ORDER — ASPIRIN 81 MG/1
81 TABLET ORAL DAILY
Qty: 90 TABLET | Refills: 1 | Status: ON HOLD | COMMUNITY
Start: 2020-07-21 | End: 2021-03-04 | Stop reason: HOSPADM

## 2020-07-21 NOTE — PROGRESS NOTES
I spoke with her daughter after receiving records from her PCP Jaime Garrison from [facility]. She cannot give me a reason why she is on Eliquis looking back through the records it was probably started when she had a fractured hip in October and there is discontinued. We had had this discussion already with Ms. Merlinda Lion daughter about possibly getting her off of the Eliquis and just using a baby aspirin as we can find no real indication for her to be on it.   As it happens when she had the prescriptions filled the Eliquis is not available so she did start giving her baby aspirin every day so at with the findings from the records I think that that is appropriate we can just stop the Eliquis and just use a baby aspirin every day

## 2020-07-22 ENCOUNTER — TELEPHONE (OUTPATIENT)
Dept: INTERNAL MEDICINE | Age: 85
End: 2020-07-22

## 2020-08-07 ENCOUNTER — APPOINTMENT (OUTPATIENT)
Dept: CT IMAGING | Age: 85
DRG: 556 | End: 2020-08-07
Payer: MEDICARE

## 2020-08-07 ENCOUNTER — HOSPITAL ENCOUNTER (INPATIENT)
Age: 85
LOS: 6 days | Discharge: NURSING, REHAB OR LONG-TERM CARE | DRG: 556 | End: 2020-08-13
Attending: FAMILY MEDICINE | Admitting: INTERNAL MEDICINE
Payer: MEDICARE

## 2020-08-07 ENCOUNTER — APPOINTMENT (OUTPATIENT)
Dept: GENERAL RADIOLOGY | Age: 85
DRG: 556 | End: 2020-08-07
Payer: MEDICARE

## 2020-08-07 DIAGNOSIS — F41.9 ANXIETY: ICD-10-CM

## 2020-08-07 DIAGNOSIS — R09.02 HYPOXIA: ICD-10-CM

## 2020-08-07 DIAGNOSIS — R52 INTRACTABLE PAIN: Primary | ICD-10-CM

## 2020-08-07 LAB
ALBUMIN SERPL-MCNC: 3.8 G/DL (ref 3.5–5.2)
ALP BLD-CCNC: 105 U/L (ref 35–104)
ALT SERPL-CCNC: 13 U/L (ref 5–33)
ANION GAP SERPL CALCULATED.3IONS-SCNC: 14 MMOL/L (ref 7–19)
APTT: 25.7 SEC (ref 26–36.2)
AST SERPL-CCNC: 18 U/L (ref 5–32)
BASOPHILS ABSOLUTE: 0.1 K/UL (ref 0–0.2)
BASOPHILS RELATIVE PERCENT: 0.4 % (ref 0–1)
BILIRUB SERPL-MCNC: 0.5 MG/DL (ref 0.2–1.2)
BILIRUBIN URINE: NEGATIVE
BLOOD, URINE: NEGATIVE
BUN BLDV-MCNC: 14 MG/DL (ref 8–23)
CALCIUM SERPL-MCNC: 9.5 MG/DL (ref 8.2–9.6)
CHLORIDE BLD-SCNC: 103 MMOL/L (ref 98–111)
CLARITY: CLEAR
CO2: 25 MMOL/L (ref 22–29)
COLOR: YELLOW
CREAT SERPL-MCNC: 0.7 MG/DL (ref 0.5–0.9)
EOSINOPHILS ABSOLUTE: 0 K/UL (ref 0–0.6)
EOSINOPHILS RELATIVE PERCENT: 0.3 % (ref 0–5)
GFR AFRICAN AMERICAN: >59
GFR NON-AFRICAN AMERICAN: >60
GLUCOSE BLD-MCNC: 96 MG/DL (ref 74–109)
GLUCOSE URINE: NEGATIVE MG/DL
HCT VFR BLD CALC: 40 % (ref 37–47)
HEMOGLOBIN: 13.1 G/DL (ref 12–16)
IMMATURE GRANULOCYTES #: 0.1 K/UL
INR BLD: 1.07 (ref 0.88–1.18)
KETONES, URINE: NEGATIVE MG/DL
LEUKOCYTE ESTERASE, URINE: NEGATIVE
LYMPHOCYTES ABSOLUTE: 1.1 K/UL (ref 1.1–4.5)
LYMPHOCYTES RELATIVE PERCENT: 7.3 % (ref 20–40)
MCH RBC QN AUTO: 30 PG (ref 27–31)
MCHC RBC AUTO-ENTMCNC: 32.8 G/DL (ref 33–37)
MCV RBC AUTO: 91.7 FL (ref 81–99)
MONOCYTES ABSOLUTE: 0.8 K/UL (ref 0–0.9)
MONOCYTES RELATIVE PERCENT: 5.4 % (ref 0–10)
NEUTROPHILS ABSOLUTE: 12.6 K/UL (ref 1.5–7.5)
NEUTROPHILS RELATIVE PERCENT: 86.1 % (ref 50–65)
NITRITE, URINE: NEGATIVE
PDW BLD-RTO: 12.6 % (ref 11.5–14.5)
PH UA: 7.5 (ref 5–8)
PLATELET # BLD: 243 K/UL (ref 130–400)
PMV BLD AUTO: 9.8 FL (ref 9.4–12.3)
POTASSIUM REFLEX MAGNESIUM: 4.1 MMOL/L (ref 3.5–5)
PROTEIN UA: NEGATIVE MG/DL
PROTHROMBIN TIME: 13.8 SEC (ref 12–14.6)
RBC # BLD: 4.36 M/UL (ref 4.2–5.4)
SODIUM BLD-SCNC: 142 MMOL/L (ref 136–145)
SPECIFIC GRAVITY UA: 1.01 (ref 1–1.03)
TOTAL PROTEIN: 6.8 G/DL (ref 6.6–8.7)
UROBILINOGEN, URINE: 1 E.U./DL
WBC # BLD: 14.6 K/UL (ref 4.8–10.8)

## 2020-08-07 PROCEDURE — 73502 X-RAY EXAM HIP UNI 2-3 VIEWS: CPT

## 2020-08-07 PROCEDURE — 72192 CT PELVIS W/O DYE: CPT

## 2020-08-07 PROCEDURE — 81003 URINALYSIS AUTO W/O SCOPE: CPT

## 2020-08-07 PROCEDURE — 6360000002 HC RX W HCPCS: Performed by: FAMILY MEDICINE

## 2020-08-07 PROCEDURE — 96374 THER/PROPH/DIAG INJ IV PUSH: CPT

## 2020-08-07 PROCEDURE — 6370000000 HC RX 637 (ALT 250 FOR IP): Performed by: FAMILY MEDICINE

## 2020-08-07 PROCEDURE — 85730 THROMBOPLASTIN TIME PARTIAL: CPT

## 2020-08-07 PROCEDURE — 96375 TX/PRO/DX INJ NEW DRUG ADDON: CPT

## 2020-08-07 PROCEDURE — 93005 ELECTROCARDIOGRAM TRACING: CPT | Performed by: PHYSICIAN ASSISTANT

## 2020-08-07 PROCEDURE — 96376 TX/PRO/DX INJ SAME DRUG ADON: CPT

## 2020-08-07 PROCEDURE — 73030 X-RAY EXAM OF SHOULDER: CPT

## 2020-08-07 PROCEDURE — 2580000003 HC RX 258: Performed by: FAMILY MEDICINE

## 2020-08-07 PROCEDURE — 71045 X-RAY EXAM CHEST 1 VIEW: CPT

## 2020-08-07 PROCEDURE — 36415 COLL VENOUS BLD VENIPUNCTURE: CPT

## 2020-08-07 PROCEDURE — 72125 CT NECK SPINE W/O DYE: CPT

## 2020-08-07 PROCEDURE — 1210000000 HC MED SURG R&B

## 2020-08-07 PROCEDURE — 6360000002 HC RX W HCPCS: Performed by: PHYSICIAN ASSISTANT

## 2020-08-07 PROCEDURE — 85025 COMPLETE CBC W/AUTO DIFF WBC: CPT

## 2020-08-07 PROCEDURE — 85610 PROTHROMBIN TIME: CPT

## 2020-08-07 PROCEDURE — 99283 EMERGENCY DEPT VISIT LOW MDM: CPT

## 2020-08-07 PROCEDURE — 73562 X-RAY EXAM OF KNEE 3: CPT

## 2020-08-07 PROCEDURE — 99284 EMERGENCY DEPT VISIT MOD MDM: CPT

## 2020-08-07 PROCEDURE — 80053 COMPREHEN METABOLIC PANEL: CPT

## 2020-08-07 PROCEDURE — 70450 CT HEAD/BRAIN W/O DYE: CPT

## 2020-08-07 RX ORDER — POLYETHYLENE GLYCOL 3350 17 G/17G
17 POWDER, FOR SOLUTION ORAL DAILY PRN
Status: DISCONTINUED | OUTPATIENT
Start: 2020-08-07 | End: 2020-08-13 | Stop reason: HOSPADM

## 2020-08-07 RX ORDER — SODIUM CHLORIDE 0.9 % (FLUSH) 0.9 %
10 SYRINGE (ML) INJECTION EVERY 12 HOURS SCHEDULED
Status: DISCONTINUED | OUTPATIENT
Start: 2020-08-07 | End: 2020-08-13 | Stop reason: HOSPADM

## 2020-08-07 RX ORDER — HYDROCODONE BITARTRATE AND ACETAMINOPHEN 5; 325 MG/1; MG/1
1 TABLET ORAL EVERY 4 HOURS PRN
Status: DISCONTINUED | OUTPATIENT
Start: 2020-08-07 | End: 2020-08-13

## 2020-08-07 RX ORDER — POTASSIUM CHLORIDE 7.45 MG/ML
10 INJECTION INTRAVENOUS PRN
Status: DISCONTINUED | OUTPATIENT
Start: 2020-08-07 | End: 2020-08-13 | Stop reason: HOSPADM

## 2020-08-07 RX ORDER — HYDROCODONE BITARTRATE AND ACETAMINOPHEN 5; 325 MG/1; MG/1
2 TABLET ORAL EVERY 4 HOURS PRN
Status: DISCONTINUED | OUTPATIENT
Start: 2020-08-07 | End: 2020-08-13

## 2020-08-07 RX ORDER — MORPHINE SULFATE 4 MG/ML
2 INJECTION, SOLUTION INTRAMUSCULAR; INTRAVENOUS ONCE
Status: COMPLETED | OUTPATIENT
Start: 2020-08-07 | End: 2020-08-07

## 2020-08-07 RX ORDER — MORPHINE SULFATE 4 MG/ML
1 INJECTION, SOLUTION INTRAMUSCULAR; INTRAVENOUS
Status: DISCONTINUED | OUTPATIENT
Start: 2020-08-07 | End: 2020-08-13

## 2020-08-07 RX ORDER — NALOXONE HYDROCHLORIDE 0.4 MG/ML
0.4 INJECTION, SOLUTION INTRAMUSCULAR; INTRAVENOUS; SUBCUTANEOUS PRN
Status: DISCONTINUED | OUTPATIENT
Start: 2020-08-07 | End: 2020-08-13 | Stop reason: HOSPADM

## 2020-08-07 RX ORDER — MAGNESIUM SULFATE IN WATER 40 MG/ML
2 INJECTION, SOLUTION INTRAVENOUS PRN
Status: DISCONTINUED | OUTPATIENT
Start: 2020-08-07 | End: 2020-08-13 | Stop reason: HOSPADM

## 2020-08-07 RX ORDER — POTASSIUM CHLORIDE 20 MEQ/1
40 TABLET, EXTENDED RELEASE ORAL PRN
Status: DISCONTINUED | OUTPATIENT
Start: 2020-08-07 | End: 2020-08-13 | Stop reason: HOSPADM

## 2020-08-07 RX ORDER — PROMETHAZINE HYDROCHLORIDE 12.5 MG/1
12.5 TABLET ORAL EVERY 6 HOURS PRN
Status: DISCONTINUED | OUTPATIENT
Start: 2020-08-07 | End: 2020-08-13 | Stop reason: HOSPADM

## 2020-08-07 RX ORDER — ONDANSETRON 2 MG/ML
4 INJECTION INTRAMUSCULAR; INTRAVENOUS EVERY 6 HOURS PRN
Status: DISCONTINUED | OUTPATIENT
Start: 2020-08-07 | End: 2020-08-13 | Stop reason: HOSPADM

## 2020-08-07 RX ORDER — ACETAMINOPHEN 325 MG/1
650 TABLET ORAL EVERY 6 HOURS PRN
Status: DISCONTINUED | OUTPATIENT
Start: 2020-08-07 | End: 2020-08-13 | Stop reason: HOSPADM

## 2020-08-07 RX ORDER — ONDANSETRON 2 MG/ML
4 INJECTION INTRAMUSCULAR; INTRAVENOUS ONCE
Status: COMPLETED | OUTPATIENT
Start: 2020-08-07 | End: 2020-08-07

## 2020-08-07 RX ORDER — SODIUM CHLORIDE 0.9 % (FLUSH) 0.9 %
10 SYRINGE (ML) INJECTION PRN
Status: DISCONTINUED | OUTPATIENT
Start: 2020-08-07 | End: 2020-08-13 | Stop reason: HOSPADM

## 2020-08-07 RX ORDER — ACETAMINOPHEN 650 MG/1
650 SUPPOSITORY RECTAL EVERY 6 HOURS PRN
Status: DISCONTINUED | OUTPATIENT
Start: 2020-08-07 | End: 2020-08-13 | Stop reason: HOSPADM

## 2020-08-07 RX ORDER — MORPHINE SULFATE 4 MG/ML
2 INJECTION, SOLUTION INTRAMUSCULAR; INTRAVENOUS EVERY 6 HOURS PRN
Status: DISCONTINUED | OUTPATIENT
Start: 2020-08-07 | End: 2020-08-13

## 2020-08-07 RX ADMIN — MORPHINE SULFATE 2 MG: 4 INJECTION, SOLUTION INTRAMUSCULAR; INTRAVENOUS at 15:04

## 2020-08-07 RX ADMIN — MORPHINE SULFATE 2 MG: 4 INJECTION, SOLUTION INTRAMUSCULAR; INTRAVENOUS at 20:47

## 2020-08-07 RX ADMIN — SODIUM CHLORIDE, PRESERVATIVE FREE 10 ML: 5 INJECTION INTRAVENOUS at 20:51

## 2020-08-07 RX ADMIN — HYDROCODONE BITARTRATE AND ACETAMINOPHEN 1 TABLET: 5; 325 TABLET ORAL at 17:17

## 2020-08-07 RX ADMIN — PROMETHAZINE HYDROCHLORIDE 12.5 MG: 12.5 TABLET ORAL at 20:47

## 2020-08-07 RX ADMIN — ONDANSETRON 4 MG: 2 INJECTION INTRAMUSCULAR; INTRAVENOUS at 12:39

## 2020-08-07 RX ADMIN — MORPHINE SULFATE 2 MG: 4 INJECTION, SOLUTION INTRAMUSCULAR; INTRAVENOUS at 12:39

## 2020-08-07 RX ADMIN — DICLOFENAC SODIUM 2 G: 10 GEL TOPICAL at 20:46

## 2020-08-07 ASSESSMENT — ENCOUNTER SYMPTOMS
COLOR CHANGE: 0
EYE PAIN: 0
BACK PAIN: 0
EYE REDNESS: 0
SORE THROAT: 0
VOMITING: 0
EYE DISCHARGE: 0
COUGH: 0
RHINORRHEA: 0
ABDOMINAL DISTENTION: 0
TROUBLE SWALLOWING: 0
ABDOMINAL PAIN: 0
PHOTOPHOBIA: 0
WHEEZING: 0
SHORTNESS OF BREATH: 0
NAUSEA: 0
APNEA: 0

## 2020-08-07 ASSESSMENT — PAIN SCALES - GENERAL
PAINLEVEL_OUTOF10: 8
PAINLEVEL_OUTOF10: 3
PAINLEVEL_OUTOF10: 9

## 2020-08-07 NOTE — H&P
HISTORY AND PHYSICAL             Date: 8/7/2020        Patient Name: Kamar Gallagher     YOB: 1928      Age:  80 y.o. Chief Complaint     Chief Complaint   Patient presents with    Leg Pain     right side       History Obtained From   patient, family member -patient's daughter present at the bedside, electronic medical record, emergency room provider    History of Present Illness   Patient is a 54-year-old  female with a known past medical history of depression/anxiety, dementia, hypertension presented to [address] [place] [address] emergency room status post mechanical fall suffered at daughter's home earlier today when attempting to arise from the toilet. Patient was able to rise from the ground, usually ambulates with assistance of a wheeled walker. On initial ER presentation pain rated 9/10 out of 10, states patient has hit the [facility] floor pain now down to a 4. Patient is status post 4 mg morphine injection provided in the emergency room. Work-up in the emergency room revealed unremarkable urinalysis, head CT with no acute intracranial process, CT cervical spine without abnormality, x-ray pelvis right sided with questionable fracture of the right femur however follow-up CT pelvis without contrast does not reveal any fracture. Patient admitted for continued pain control, PT/OT evaluation, home health services. Patient currently resting in bed, eating meal denying any headache change in vision, chest pain, abdominal pain, nausea vomiting, fevers or chills. No recent medication adjustments per daughter.       Past Medical History     Past Medical History:   Diagnosis Date    Anxiety     Depression     Hx of blood clots     Hypertension         Past Surgical History     Past Surgical History:   Procedure Laterality Date    APPENDECTOMY      ARM SURGERY      HYSTERECTOMY, TOTAL ABDOMINAL      LEG SURGERY          Medications Prior to Admission     Prior to Admission medications    Medication Sig Start Date End Date Taking? Authorizing Provider   aspirin EC 81 MG EC tablet Take 1 tablet by mouth daily 7/21/20   GEOFF Christian   lisinopril (PRINIVIL;ZESTRIL) 10 MG tablet Take 1 tablet by mouth daily 7/10/20 8/9/20  GEOFF Christian   furosemide (LASIX) 40 MG tablet Take 1 tablet by mouth 2 times daily 7/10/20 8/9/20  GEOFF Christian   donepezil (ARICEPT) 10 MG tablet Take 1 tablet by mouth nightly 7/10/20 8/9/20  GEOFF Christian   escitalopram (LEXAPRO) 10 MG tablet Take 1 tablet by mouth daily 7/1/20   GEOFF Christian        Allergies   Patient has no known allergies. Social History     Social History     Tobacco History     Smoking Status  Never Smoker    Smokeless Tobacco Use  Never Used          Alcohol History     Alcohol Use Status  Never          Drug Use     Drug Use Status  Never          Sexual Activity     Sexually Active  Not Asked                Family History     Family History   Family history unknown: Yes       Review of Systems   Review of Systems   Constitutional: Positive for activity change. Negative for fatigue and fever. HENT: Negative for sore throat and trouble swallowing. Eyes: Negative for redness and visual disturbance. Respiratory: Negative for cough, shortness of breath and wheezing. Cardiovascular: Negative for palpitations and leg swelling. Gastrointestinal: Negative for nausea and vomiting. Musculoskeletal: Positive for arthralgias. Skin: Negative for color change and pallor. Neurological: Positive for weakness. Negative for light-headedness and headaches. Psychiatric/Behavioral: Positive for confusion (dementia at baseline). Physical Exam   BP (!) 173/65   Pulse 72   Temp 97.3 °F (36.3 °C) (Temporal)   Resp 16   Ht 5' 2\" (1.575 m)   Wt 125 lb (56.7 kg)   SpO2 94%   BMI 22.86 kg/m²     Physical Exam  Vitals signs and nursing note reviewed. Constitutional:       General: She is not in acute distress. Appearance: She is not toxic-appearing. HENT:      Head: Normocephalic and atraumatic. Nose: Nose normal.   Eyes:      General:         Right eye: No discharge. Left eye: No discharge. Conjunctiva/sclera: Conjunctivae normal.   Neck:      Musculoskeletal: Normal range of motion. Cardiovascular:      Rate and Rhythm: Normal rate and regular rhythm. Pulses: Normal pulses. Pulmonary:      Effort: Pulmonary effort is normal.      Breath sounds: No wheezing or rales. Abdominal:      General: Bowel sounds are normal.      Tenderness: There is no abdominal tenderness. There is no guarding or rebound. Musculoskeletal:         General: Tenderness present. Right hip: She exhibits decreased range of motion, tenderness and bony tenderness. Skin:     Capillary Refill: Capillary refill takes less than 2 seconds. Neurological:      General: No focal deficit present. Mental Status: She is alert and oriented to person, place, and time. Cranial Nerves: No cranial nerve deficit.    Psychiatric:         Mood and Affect: Mood normal.         Labs      Recent Results (from the past 24 hour(s))   CBC Auto Differential    Collection Time: 08/07/20 12:30 PM   Result Value Ref Range    WBC 14.6 (H) 4.8 - 10.8 K/uL    RBC 4.36 4.20 - 5.40 M/uL    Hemoglobin 13.1 12.0 - 16.0 g/dL    Hematocrit 40.0 37.0 - 47.0 %    MCV 91.7 81.0 - 99.0 fL    MCH 30.0 27.0 - 31.0 pg    MCHC 32.8 (L) 33.0 - 37.0 g/dL    RDW 12.6 11.5 - 14.5 %    Platelets 236 585 - 367 K/uL    MPV 9.8 9.4 - 12.3 fL    Neutrophils % 86.1 (H) 50.0 - 65.0 %    Lymphocytes % 7.3 (L) 20.0 - 40.0 %    Monocytes % 5.4 0.0 - 10.0 %    Eosinophils % 0.3 0.0 - 5.0 %    Basophils % 0.4 0.0 - 1.0 %    Neutrophils Absolute 12.6 (H) 1.5 - 7.5 K/uL    Immature Granulocytes # 0.1 K/uL    Lymphocytes Absolute 1.1 1.1 - 4.5 K/uL    Monocytes Absolute 0.80 0.00 - 0.90 K/uL    Eosinophils Absolute 0.00 0.00 - 0.60 K/uL    Basophils Absolute 0.10 diffuse osteoporosis. There is no fracture or dislocation. 1. No acute osseous abnormality. 2. Osteoporosis. 3. Osteoarthritic changes Signed by Dr Josefina Brand on 8/7/2020 1:31 PM    Xr Knee Right (3 Views)    Result Date: 8/7/2020  EXAMINATION:  XR KNEE RIGHT (3 VIEWS)  8/7/2020 1:31 PM HISTORY: Right knee pain post fall COMPARISON: No comparison study. TECHNIQUE: 2 views: AP and lateral projection imaging FINDINGS: The patella femoral and tibial relationships are maintained without fracture or dislocation. Osteoarthritic changes involving the medial joint space compartment identified with near complete loss of joint space, subchondral sclerosis and osteophyte formation. No significant joint effusion identified. 1. No acute osseous abnormality. Signed by Dr Josefina Brand on 8/7/2020 1:32 PM    Ct Head Wo Contrast    Result Date: 8/7/2020  EXAMINATION: CT HEAD WO CONTRAST 8/7/2020 1:30 PM HISTORY: CT BRAIN without contrast 8/7/2020 12:10 PM HISTORY: Confusion patient fell COMPARISON: None DLP: 716 mGy cm TECHNIQUE: Serial axial tomographic images of the brain were obtained without the use of intravenous contrast. FINDINGS: The midline structures are nondisplaced. There is moderate cerebral and cerebellar volume loss, with an associated increase in the prominence of the ventricles and sulci. The basilar cisterns are normal in size and configuration. There is no evidence of intracranial hemorrhage or mass-effect. There is low attenuation in the periventricular white matter, consistent with chronic ischemic change. There are no abnormal extra-axial fluid collections. There is no evidence of tonsillar herniation. The included orbits and their contents are unremarkable. The visualized paranasal sinuses, mastoid air cells and middle ear cavities are clear. The visualized osseous structures and overlying soft tissues of the skull and face are intact.      Moderate cerebral and cerebellar volume loss with chronic microvascular disease but no evidence of acute intracranial process. Signed by Dr Pardeep Sheffield on 8/7/2020 1:32 PM    Ct Cervical Spine Wo Contrast    Result Date: 8/7/2020  CT CERVICAL SPINE WO CONTRAST 8/7/2020 12:10 PM HISTORY: Neck pain post fall COMPARISON: None. DOSE: Radiation dose equals  mGy cm. AUTOMATED EXPOSURE CONTROL dose reduction technique was implemented. TECHNIQUE:  2 mm sequential transaxial images were obtained. 2-D sagittal and coronal reconstruction images were generated. FINDINGS: [ There is osteoporosis. There is exaggerated lordosis of the upper cervical spine. There is facet arthropathy diffusely. The facets are appropriately aligned. The vertebral body heights are maintained. There is disc degeneration with near complete loss of disc space C6-C7 with disc degeneration also noted at C5-C6. Prevertebral soft tissues are normal. There is no CT evidence of acute fracture or subluxation. No significant canal stenosis. There is no CT evidence of posttraumatic disc herniation. There is multilevel foraminal narrowing. The upper lung fields are grossly clear. There are carotid artery calcifications. 1. No CT evidence of acute bony injury to the cervical spine. Signed by Dr Silvestre Lagos on 8/7/2020 1:38 PM    Ct Pelvis Wo Contrast Additional Contrast? None    Result Date: 8/7/2020  EXAMINATION:  CT PELVIS WO CONTRAST  8/7/2020 3:02 PM HISTORY: Pain post fall COMPARISON: Right hip radiographs and abdomen pelvis CT dated 7/7/2020 TECHNIQUE: Radiation dose equals DLP 1554 mGy cm. AUTOMATED EXPOSURE CONTROL dose reduction technique was implemented. Thin section axial images were obtained without intravenous contrast. Multi projection reconstruction images were generated. FINDINGS: The right femoral head is imaged appropriately within the acetabulum. The proximal femur is intact without fracture. Changes from left femoral neck pinning identified. No left femur fracture observed.  Bony pelvis is intact without fracture. Degenerative changes noted in the lower lumbar spine. There is osteoporosis. No definite acute soft tissue abnormalities associated with the right hip identified. There is extensive diverticulosis of the sigmoid colon. There are atherosclerotic aortoiliac calcifications. 1. No CT evidence of acute bony injury to the right hip/pelvis. Signed by Dr Bernice Lemus on 8/7/2020 3:06 PM    Xr Chest Portable    Result Date: 8/7/2020  XR CHEST PORTABLE 8/7/2020 11:23 AM HISTORY:   Hypertension. Trauma , fall Single view. COMPARISONS:  None FINDINGS: The level inspiration is shallow and lung volumes diminished. There are no parenchymal infiltrates, pleural effusions or pneumothoraces. Mild linear atelectasis suspected in the lung bases associated with the level of inspiration. The heart is within the upper limits of normal in size with ectasia the aorta calcifications aortic arch without evidence of heart failure. There is osteoporosis. Degenerative changes noted in the thoracic spine and glenohumeral joints. No definite acute osseous abnormality observed    No acute cardiopulmonary process. Signed by Dr Bernice Lemus on 8/7/2020 1:34 PM    Xr Hip 2-3 Vw W Pelvis Right    Result Date: 8/7/2020  EXAM: XR HIP 2-3 VW W PELVIS RIGHT -- 8/7/2020 11:23 AM HISTORY: 89 years, Female, pain, spasm-like effect, right-sided pain, status post fall. COMPARISON: No existing relevant imaging studies available TECHNIQUE:  3 images. AP pelvis, AP and lateral views the right hip FINDINGS:  Diffuse bone demineralization. Right femur with a subtle cortical defect at the lesser trochanter, which could represent degenerative change or nearly nondisplaced fracture. Right hip normally aligned. Sacroiliac joints grossly symmetric. Sacral arcuate lines limited in assessment due to bony demineralization and overlying bowel gas. Pubic symphysis anatomic. Left proximal femur with dynamic fixation pin and intramedullary rosina. Distal tip of the intramedullary rosina outside the field of view. At least mild degenerative changes of the bilateral hips. Degenerative changes in the lumbar spine. Structure external to the patient between the legs, may represent a fecal or urinary management device. 1. Right femur with a subtle cortical defect at the lesser trochanter, which could represent a nearly nondisplaced fracture or degenerative change. Recommend CT for further evaluation. 2. Diffuse bone demineralization. 3. Postoperative changes of the left proximal femur. Results discussed by telephone with Augustus Sims at 1:39 PM on 8/7/2020. Signed by Dr Cuco Mukherjee on 8/7/2020 1:40 PM      Assessment      Hospital Problems           Last Modified POA    Intractable pain 8/7/2020 Yes          Plan     Intractable pain/status post mechanical fall/x-ray hip right abnormality/diffuse bone demineralization  - PT/OT  - Pain control modalities  - Vitamin D within normal limits  -Anticipate patient will be benefited from skilled nursing facility, case management for discharge disposition assistance    Elevated alkaline phosphatase  -Provide intravenous fluids    Reactive leukocytosis  -Likely related to stress of fall/pain  -Chest x-ray-no acute cardiopulmonary process    Chronic Benign Essential Hypertension   - Stable   - Continue current medications   - PRN medications ordered   - Will continue to monitor     Alzheimer's dementia  -Chronic condition, continue with Aricept    Depression/anxiety  -Chronic condition, continue SSRI    Consultations Ordered:  IP CONSULT TO SOCIAL WORK    Electronically signed by   [facility]   Internal Medicine Hospitalist  On 8/7/2020  At 5:41 PM    EMR Dragon/Transcription disclaimer:   Much of this encounter note is an electronic transcription/translation of spoken language to printed text.  The electronic translation of spoken language may permit erroneous, or at times, nonsensical words or phrases to be inadvertently transcribed; although attempts have made to review the note for such errors, some may still exist.

## 2020-08-07 NOTE — TELEPHONE ENCOUNTER
[place] Mckeon called requesting a refill of the below medication which has been pended for you:     Requested Prescriptions     Pending Prescriptions Disp Refills    LORazepam (ATIVAN) 0.5 MG tablet [Pharmacy Med Name: LORazepam 0.5 MG TABLET] 30 tablet 0     Sig: TAKE ONE TABLET BY MOUTH EVERY NIGHT AT BEDTIME **MUST LAST 30 DAYS**       Last Appointment Date: 7/1/2020  Next Appointment Date: Visit date not found    No Known Allergies

## 2020-08-07 NOTE — ED PROVIDER NOTES
[facility] EMERGENCY DEPT  eMERGENCYdEPARTMENT eNCOUnter      Pt Name: Morena Polanco  MRN: [id number]  [place] 11/13/1928  Date of evaluation: 8/7/2020  Provider:NATALIE Garcia    CHIEF COMPLAINT       Chief Complaint   Patient presents with    Leg Pain     right side         HISTORY OF PRESENT ILLNESS  (Location/Symptom, Timing/Onset, Context/Setting, Quality, Duration, Modifying Factors, Severity.)   Morena Polanco is a 80 y.o. female who presents to the emergency department with complaints of right sided hip pain post fall. She hasnt walked since at mental baseline per daughter aspirin on board prior DVT history with dementia. She fell which was heard by daughter. Mechanical fall is assumed. She has pain in right hip and knee. Denies hitting her head or LOC or headache. HPI    Nursing Notes were reviewed and I agree. REVIEW OF SYSTEMS    (2-9 systems for level 4, 10 or more for level 5)     Review of Systems   Constitutional: Negative for activity change, appetite change, chills and fever. HENT: Negative for congestion, postnasal drip, rhinorrhea and sore throat. Eyes: Negative for photophobia, pain, discharge and visual disturbance. Respiratory: Negative for apnea, cough and shortness of breath. Cardiovascular: Negative for chest pain and leg swelling. Gastrointestinal: Negative for abdominal distention, abdominal pain and nausea. Genitourinary: Negative for vaginal bleeding. Musculoskeletal: Positive for arthralgias and gait problem. Negative for back pain, joint swelling, neck pain and neck stiffness. Skin: Negative for color change and rash. Neurological: Negative for dizziness, syncope, facial asymmetry and headaches. Hematological: Negative for adenopathy. Does not bruise/bleed easily. Psychiatric/Behavioral: Negative for agitation, behavioral problems and confusion. Except as noted above the remainder of the review of systems was reviewed and negative.        PAST MEDICAL HISTORY Past Medical History:   Diagnosis Date    Anxiety     Depression     Hx of blood clots     Hypertension          SURGICAL HISTORY       Past Surgical History:   Procedure Laterality Date    APPENDECTOMY      ARM SURGERY      HYSTERECTOMY, TOTAL ABDOMINAL      LEG SURGERY           CURRENT MEDICATIONS       Previous Medications    ASPIRIN EC 81 MG EC TABLET    Take 1 tablet by mouth daily    DONEPEZIL (ARICEPT) 10 MG TABLET    Take 1 tablet by mouth nightly    ESCITALOPRAM (LEXAPRO) 10 MG TABLET    Take 1 tablet by mouth daily    FUROSEMIDE (LASIX) 40 MG TABLET    Take 1 tablet by mouth 2 times daily    LISINOPRIL (PRINIVIL;ZESTRIL) 10 MG TABLET    Take 1 tablet by mouth daily       ALLERGIES     Patient has no known allergies. FAMILY HISTORY       Family History   Family history unknown: Yes          SOCIAL HISTORY       Social History     Socioeconomic History    Marital status:       Spouse name: None    Number of children: None    Years of education: None    Highest education level: None   Occupational History    None   Social Needs    Financial resource strain: None    Food insecurity     Worry: None     Inability: None    Transportation needs     Medical: None     Non-medical: None   Tobacco Use    Smoking status: Never Smoker    Smokeless tobacco: Never Used   Substance and Sexual Activity    Alcohol use: Never     Frequency: Never    Drug use: Never    Sexual activity: None   Lifestyle    Physical activity     Days per week: None     Minutes per session: None    Stress: None   Relationships    Social connections     Talks on phone: None     Gets together: None     Attends [employer] service: None     Active member of club or organization: None     Attends meetings of clubs or organizations: None     Relationship status: None    Intimate partner violence     Fear of current or ex partner: None     Emotionally abused: None     Physically abused: None     Forced sexual activity: None   Other Topics Concern    None   Social History Narrative    None       SCREENINGS           PHYSICAL EXAM    (up to 7 forlevel 4, 8 or more for level 5)     ED Triage Vitals [08/07/20 1129]   BP Temp Temp Source Pulse Resp SpO2 Height Weight   (!) 150/57 98.2 °F (36.8 °C) Oral 69 18 92 % 5' 2\" (1.575 m) 125 lb (56.7 kg)       Physical Exam  Vitals signs and nursing note reviewed. Constitutional:       General: She is not in acute distress. Appearance: Normal appearance. She is well-developed. She is not diaphoretic. HENT:      Head: Normocephalic and atraumatic. Right Ear: External ear normal.      Left Ear: External ear normal.      Mouth/Throat:      Pharynx: No oropharyngeal exudate. Eyes:      General:         Right eye: No discharge. Left eye: No discharge. Pupils: Pupils are equal, round, and reactive to light. Neck:      Musculoskeletal: Normal range of motion and neck supple. Thyroid: No thyromegaly. Cardiovascular:      Rate and Rhythm: Normal rate and regular rhythm. Pulses: Normal pulses. Heart sounds: Normal heart sounds. No murmur. No friction rub. Pulmonary:      Effort: Pulmonary effort is normal. No respiratory distress. Breath sounds: Normal breath sounds. No stridor. No wheezing. Abdominal:      General: Bowel sounds are normal. There is no distension. Palpations: Abdomen is soft. Tenderness: There is no abdominal tenderness. Musculoskeletal:         General: Tenderness and signs of injury present. Comments: Limited ROM due to pain worse at R hip joint   Skin:     General: Skin is warm and dry. Capillary Refill: Capillary refill takes less than 2 seconds. Findings: No rash. Neurological:      General: No focal deficit present. Mental Status: She is alert. Mental status is at baseline. Cranial Nerves: No cranial nerve deficit. Sensory: No sensory deficit.       Coordination: Coordination normal.      Comments: Normal mentation baseline per daughter   Psychiatric:      Comments: dementia           DIAGNOSTIC RESULTS     RADIOLOGY:   Non-plain film images such as CT, Ultrasound and MRI are read by the radiologist. Plain radiographic images are visualized and preliminarilyinterpreted by Shelby Turner MD with the below findings:      Interpretation per the Radiologist below, if available at the time of this note:    CT PELVIS WO CONTRAST Additional Contrast? None   Final Result   1. No CT evidence of acute bony injury to the right hip/pelvis. Signed by Dr Severiano Fluke on 8/7/2020 3:06 PM      CT Cervical Spine WO Contrast   Final Result   1. No CT evidence of acute bony injury to the cervical spine. Signed by Dr Severiano Fluke on 8/7/2020 1:38 PM      CT Head WO Contrast   Final Result   Moderate cerebral and cerebellar volume loss with chronic   microvascular disease but no evidence of acute intracranial process. Signed by Dr Skylar Butler on 8/7/2020 1:32 PM      XR HIP 2-3 VW W PELVIS RIGHT   Final Result   1. Right femur with a subtle cortical defect at the lesser trochanter,   which could represent a nearly nondisplaced fracture or degenerative   change. Recommend CT for further evaluation. 2. Diffuse bone demineralization. 3. Postoperative changes of the left proximal femur. Results discussed by telephone with Augustus Sims at 1:39 PM on   8/7/2020. Signed by Dr Cuco Mukherjee on 8/7/2020 1:40 PM      XR KNEE RIGHT (3 VIEWS)   Final Result   1. No acute osseous abnormality. Signed by Dr Severiano Fluke on 8/7/2020 1:32 PM      XR CHEST PORTABLE   Final Result   No acute cardiopulmonary process. Signed by Dr Severiano Fluke on 8/7/2020 1:34 PM      XR SHOULDER RIGHT (MIN 2 VIEWS)   Final Result   1. No acute osseous abnormality. 2. Osteoporosis.    3. Osteoarthritic changes   Signed by Dr Severiano Fluke on 8/7/2020 1:31 PM          LABS:  Labs Reviewed   CBC WITH AUTO DIFFERENTIAL - Abnormal; Notable for the following components:       Result Value    WBC 14.6 (*)     MCHC 32.8 (*)     Neutrophils % 86.1 (*)     Lymphocytes % 7.3 (*)     Neutrophils Absolute 12.6 (*)     All other components within normal limits   COMPREHENSIVE METABOLIC PANEL W/ REFLEX TO MG FOR LOW K - Abnormal; Notable for the following components:    Alkaline Phosphatase 105 (*)     All other components within normal limits   APTT - Abnormal; Notable for the following components:    aPTT 25.7 (*)     All other components within normal limits   PROTIME-INR   URINE RT REFLEX TO CULTURE       All other labs were within normal range or notreturned as of this dictation. RE-ASSESSMENT        EMERGENCY DEPARTMENT COURSE and DIFFERENTIAL DIAGNOSIS/MDM:   Vitals:    Vitals:    08/07/20 1502 08/07/20 1530 08/07/20 1532 08/07/20 1602   BP: (!) 137/51  (!) 137/49 (!) 138/95   Pulse:       Resp:       Temp:       TempSrc:       SpO2: (!) 87% 93% 93% 93%   Weight:       Height:           MDM  No obvious findings on CT of her right hip she continues to have pain after 2 doses of morphine she is also a little bit hypoxic I ordered an ABG the plan will be for admission and likely placement. Dr. Eddie Schmitz agrees to take over care. PROCEDURES:    Procedures      FINAL IMPRESSION      1. Intractable pain    2.  Hypoxia          DISPOSITION/PLAN   DISPOSITION Admitted 08/07/2020 03:36:30 PM      PATIENT REFERRED TO:  GEOFF Mandujano/ [address]  [phone]            DISCHARGE MEDICATIONS:  New Prescriptions    No medications on file       (Please note that portions of this note were completed with a voice recognition program.  Efforts were made to edit the dictations but occasionallywords are mis-transcribed.)    Zackery Aquino [phone], [phone] Phan  08/07/20 5350

## 2020-08-07 NOTE — ED NOTES
Bed: 11  Expected date:   Expected time:   Means of arrival:   Comments:  EMS [address], RN  08/07/20 1123

## 2020-08-08 LAB
ALBUMIN SERPL-MCNC: 3.3 G/DL (ref 3.5–5.2)
ALP BLD-CCNC: 93 U/L (ref 35–104)
ALT SERPL-CCNC: 11 U/L (ref 5–33)
ANION GAP SERPL CALCULATED.3IONS-SCNC: 13 MMOL/L (ref 7–19)
AST SERPL-CCNC: 15 U/L (ref 5–32)
BILIRUB SERPL-MCNC: 0.7 MG/DL (ref 0.2–1.2)
BUN BLDV-MCNC: 14 MG/DL (ref 8–23)
CALCIUM SERPL-MCNC: 8.8 MG/DL (ref 8.2–9.6)
CHLORIDE BLD-SCNC: 104 MMOL/L (ref 98–111)
CO2: 24 MMOL/L (ref 22–29)
CREAT SERPL-MCNC: 0.8 MG/DL (ref 0.5–0.9)
GFR AFRICAN AMERICAN: >59
GFR NON-AFRICAN AMERICAN: >60
GLUCOSE BLD-MCNC: 106 MG/DL (ref 74–109)
HCT VFR BLD CALC: 37.6 % (ref 37–47)
HEMOGLOBIN: 12.3 G/DL (ref 12–16)
MCH RBC QN AUTO: 30.1 PG (ref 27–31)
MCHC RBC AUTO-ENTMCNC: 32.7 G/DL (ref 33–37)
MCV RBC AUTO: 92.2 FL (ref 81–99)
PDW BLD-RTO: 12.8 % (ref 11.5–14.5)
PLATELET # BLD: 215 K/UL (ref 130–400)
PMV BLD AUTO: 9.4 FL (ref 9.4–12.3)
POTASSIUM SERPL-SCNC: 3.8 MMOL/L (ref 3.5–5)
RBC # BLD: 4.08 M/UL (ref 4.2–5.4)
SODIUM BLD-SCNC: 141 MMOL/L (ref 136–145)
TOTAL PROTEIN: 5.9 G/DL (ref 6.6–8.7)
WBC # BLD: 10.3 K/UL (ref 4.8–10.8)

## 2020-08-08 PROCEDURE — 1210000000 HC MED SURG R&B

## 2020-08-08 PROCEDURE — 36415 COLL VENOUS BLD VENIPUNCTURE: CPT

## 2020-08-08 PROCEDURE — 96376 TX/PRO/DX INJ SAME DRUG ADON: CPT

## 2020-08-08 PROCEDURE — 6370000000 HC RX 637 (ALT 250 FOR IP): Performed by: FAMILY MEDICINE

## 2020-08-08 PROCEDURE — 80053 COMPREHEN METABOLIC PANEL: CPT

## 2020-08-08 PROCEDURE — 6360000002 HC RX W HCPCS: Performed by: FAMILY MEDICINE

## 2020-08-08 PROCEDURE — 2580000003 HC RX 258: Performed by: FAMILY MEDICINE

## 2020-08-08 PROCEDURE — 85027 COMPLETE CBC AUTOMATED: CPT

## 2020-08-08 PROCEDURE — 97161 PT EVAL LOW COMPLEX 20 MIN: CPT

## 2020-08-08 PROCEDURE — 97530 THERAPEUTIC ACTIVITIES: CPT

## 2020-08-08 RX ADMIN — PROMETHAZINE HYDROCHLORIDE 12.5 MG: 12.5 TABLET ORAL at 09:05

## 2020-08-08 RX ADMIN — DICLOFENAC SODIUM 2 G: 10 GEL TOPICAL at 21:50

## 2020-08-08 RX ADMIN — HYDROCODONE BITARTRATE AND ACETAMINOPHEN 1 TABLET: 5; 325 TABLET ORAL at 22:24

## 2020-08-08 RX ADMIN — PROMETHAZINE HYDROCHLORIDE 12.5 MG: 12.5 TABLET ORAL at 22:24

## 2020-08-08 RX ADMIN — SODIUM CHLORIDE, PRESERVATIVE FREE 10 ML: 5 INJECTION INTRAVENOUS at 21:49

## 2020-08-08 RX ADMIN — DICLOFENAC SODIUM 2 G: 10 GEL TOPICAL at 09:05

## 2020-08-08 RX ADMIN — MORPHINE SULFATE 2 MG: 4 INJECTION, SOLUTION INTRAMUSCULAR; INTRAVENOUS at 23:45

## 2020-08-08 RX ADMIN — SODIUM CHLORIDE, PRESERVATIVE FREE 10 ML: 5 INJECTION INTRAVENOUS at 09:08

## 2020-08-08 RX ADMIN — HYDROCODONE BITARTRATE AND ACETAMINOPHEN 2 TABLET: 5; 325 TABLET ORAL at 14:51

## 2020-08-08 ASSESSMENT — ENCOUNTER SYMPTOMS
SORE THROAT: 0
WHEEZING: 0
SHORTNESS OF BREATH: 0
COLOR CHANGE: 0
VOMITING: 0
NAUSEA: 0
COUGH: 0
TROUBLE SWALLOWING: 0

## 2020-08-08 ASSESSMENT — PAIN SCALES - GENERAL
PAINLEVEL_OUTOF10: 9
PAINLEVEL_OUTOF10: 6
PAINLEVEL_OUTOF10: 4

## 2020-08-08 NOTE — PROGRESS NOTES
08/08/20 9595   General Comment   Comments Checked on patient in PM .  Patient BTB  by [place]   Physical Therapy    Electronically signed by Galindo Srivastava PTA on 8/8/2020 at 4:21 PM

## 2020-08-08 NOTE — PROGRESS NOTES
Progress Note    Date:8/8/2020       Room:0507/507-01  Patient Sunshine Prado     YOB: 1928     Age:89 y.o. Subjective   Interval History Status: improved. Patient seen and examined this a.m. resting soundly in bed. Patient states pain has been improved has yet to work with physical therapy. Patient currently denying any headache, change in vision, chest pain, shortness of breath, abdominal pain, nausea vomiting, fevers or chills. Cumulative hospital course: Patient admitted 8/7 status post mechanical fall suffered at daughter's home when attempting to rise from the toilet. Patient was able to get herself up off the ground usually ambulates with assistance of a wheeled walker. Patient received 4 mg dosing of morphine in the emergency room with no resolution of pain subsequently admitted to the surgical unit. Awaiting PT/OT evaluation, discussed case with daughter who wishes patient to return home upon discharge with home health services, social work consult also placed for assistance with Medicare/Medicaid set up. Review of Systems   Review of Systems   Constitutional: Positive for activity change. Negative for fatigue and fever. HENT: Negative for sore throat and trouble swallowing. Respiratory: Negative for cough, shortness of breath and wheezing. Cardiovascular: Negative for chest pain and leg swelling. Gastrointestinal: Negative for nausea and vomiting. Genitourinary: Negative for difficulty urinating. Musculoskeletal: Positive for arthralgias. Skin: Negative for color change and rash. Neurological: Positive for weakness. Negative for light-headedness and headaches.    Psychiatric/Behavioral:        Baseline dementia, patient able to converse and answer all question       Medications   Scheduled Meds:    sodium chloride flush  10 mL Intravenous 2 times per day    enoxaparin  40 mg Subcutaneous Daily    diclofenac sodium  2 g Topical BID     Continuous Infusions:   PRN Meds: sodium chloride flush, acetaminophen **OR** acetaminophen, polyethylene glycol, promethazine **OR** ondansetron, potassium chloride **OR** potassium alternative oral replacement **OR** potassium chloride, magnesium sulfate, HYDROcodone 5 mg - acetaminophen **OR** HYDROcodone 5 mg - acetaminophen, morphine **OR** morphine, naloxone    Past History    Past Medical History:   has a past medical history of Anxiety, Depression, Hx of blood clots, and Hypertension. Social History:   reports that she has never smoked. She has never used smokeless tobacco. She reports that she does not drink alcohol or use drugs. Family History:   Family History   Family history unknown: Yes       Physical Examination      Vitals:  BP (!) 128/54   Pulse 60   Temp 97.2 °F (36.2 °C) (Temporal)   Resp 24   Ht 5' 2\" (1.575 m)   Wt 125 lb (56.7 kg)   SpO2 91%   BMI 22.86 kg/m²   Temp (24hrs), Av.1 °F (36.2 °C), Min:96.5 °F (35.8 °C), Max:98.2 °F (36.8 °C)      I/O (24Hr): Intake/Output Summary (Last 24 hours) at 2020 1001  Last data filed at 2020 0952  Gross per 24 hour   Intake 370 ml   Output 400 ml   Net -30 ml       Physical Exam  Vitals signs and nursing note reviewed. Constitutional:       General: She is not in acute distress. Appearance: She is not toxic-appearing. HENT:      Head: Normocephalic and atraumatic. Nose: Nose normal.   Eyes:      General:         Right eye: No discharge. Left eye: No discharge. Conjunctiva/sclera: Conjunctivae normal.   Neck:      Musculoskeletal: Normal range of motion. Cardiovascular:      Rate and Rhythm: Normal rate and regular rhythm. Pulses: Normal pulses. Pulmonary:      Effort: Pulmonary effort is normal.      Breath sounds: No wheezing or rales. Abdominal:      General: Bowel sounds are normal.      Tenderness: There is no abdominal tenderness. There is no guarding or rebound.    Musculoskeletal:         General: fracture or dislocation. Osteoarthritic changes involving the medial joint space compartment identified with near complete loss of joint space, subchondral sclerosis and osteophyte formation. No significant joint effusion identified. 1. No acute osseous abnormality. Signed by Dr Annie Winter on 8/7/2020 1:32 PM    Ct Head Wo Contrast    Result Date: 8/7/2020  EXAMINATION: CT HEAD WO CONTRAST 8/7/2020 1:30 PM HISTORY: CT BRAIN without contrast 8/7/2020 12:10 PM HISTORY: Confusion patient fell COMPARISON: None DLP: 716 mGy cm TECHNIQUE: Serial axial tomographic images of the brain were obtained without the use of intravenous contrast. FINDINGS: The midline structures are nondisplaced. There is moderate cerebral and cerebellar volume loss, with an associated increase in the prominence of the ventricles and sulci. The basilar cisterns are normal in size and configuration. There is no evidence of intracranial hemorrhage or mass-effect. There is low attenuation in the periventricular white matter, consistent with chronic ischemic change. There are no abnormal extra-axial fluid collections. There is no evidence of tonsillar herniation. The included orbits and their contents are unremarkable. The visualized paranasal sinuses, mastoid air cells and middle ear cavities are clear. The visualized osseous structures and overlying soft tissues of the skull and face are intact. Moderate cerebral and cerebellar volume loss with chronic microvascular disease but no evidence of acute intracranial process. Signed by Dr Hyun Ibrahim on 8/7/2020 1:32 PM    Ct Cervical Spine Wo Contrast    Result Date: 8/7/2020  CT CERVICAL SPINE WO CONTRAST 8/7/2020 12:10 PM HISTORY: Neck pain post fall COMPARISON: None. DOSE: Radiation dose equals  mGy cm. AUTOMATED EXPOSURE CONTROL dose reduction technique was implemented. TECHNIQUE:  2 mm sequential transaxial images were obtained.  2-D sagittal and coronal reconstruction images were generated. FINDINGS: [ There is osteoporosis. There is exaggerated lordosis of the upper cervical spine. There is facet arthropathy diffusely. The facets are appropriately aligned. The vertebral body heights are maintained. There is disc degeneration with near complete loss of disc space C6-C7 with disc degeneration also noted at C5-C6. Prevertebral soft tissues are normal. There is no CT evidence of acute fracture or subluxation. No significant canal stenosis. There is no CT evidence of posttraumatic disc herniation. There is multilevel foraminal narrowing. The upper lung fields are grossly clear. There are carotid artery calcifications. 1. No CT evidence of acute bony injury to the cervical spine. Signed by Dr Mya Paul on 8/7/2020 1:38 PM    Ct Pelvis Wo Contrast Additional Contrast? None    Result Date: 8/7/2020  EXAMINATION:  CT PELVIS WO CONTRAST  8/7/2020 3:02 PM HISTORY: Pain post fall COMPARISON: Right hip radiographs and abdomen pelvis CT dated 7/7/2020 TECHNIQUE: Radiation dose equals DLP 1554 mGy cm. AUTOMATED EXPOSURE CONTROL dose reduction technique was implemented. Thin section axial images were obtained without intravenous contrast. Multi projection reconstruction images were generated. FINDINGS: The right femoral head is imaged appropriately within the acetabulum. The proximal femur is intact without fracture. Changes from left femoral neck pinning identified. No left femur fracture observed. Bony pelvis is intact without fracture. Degenerative changes noted in the lower lumbar spine. There is osteoporosis. No definite acute soft tissue abnormalities associated with the right hip identified. There is extensive diverticulosis of the sigmoid colon. There are atherosclerotic aortoiliac calcifications. 1. No CT evidence of acute bony injury to the right hip/pelvis.  Signed by Dr Mya Paul on 8/7/2020 3:06 PM    Xr Chest Portable    Result Date: 8/7/2020  XR CHEST PORTABLE 8/7/2020 11:23 AM HISTORY:   Hypertension. Trauma , fall Single view. COMPARISONS:  None FINDINGS: The level inspiration is shallow and lung volumes diminished. There are no parenchymal infiltrates, pleural effusions or pneumothoraces. Mild linear atelectasis suspected in the lung bases associated with the level of inspiration. The heart is within the upper limits of normal in size with ectasia the aorta calcifications aortic arch without evidence of heart failure. There is osteoporosis. Degenerative changes noted in the thoracic spine and glenohumeral joints. No definite acute osseous abnormality observed    No acute cardiopulmonary process. Signed by Dr Barbi Khan on 8/7/2020 1:34 PM    Xr Hip 2-3 Vw W Pelvis Right    Result Date: 8/7/2020  EXAM: XR HIP 2-3 VW W PELVIS RIGHT -- 8/7/2020 11:23 AM HISTORY: 89 years, Female, pain, spasm-like effect, right-sided pain, status post fall. COMPARISON: No existing relevant imaging studies available TECHNIQUE:  3 images. AP pelvis, AP and lateral views the right hip FINDINGS:  Diffuse bone demineralization. Right femur with a subtle cortical defect at the lesser trochanter, which could represent degenerative change or nearly nondisplaced fracture. Right hip normally aligned. Sacroiliac joints grossly symmetric. Sacral arcuate lines limited in assessment due to bony demineralization and overlying bowel gas. Pubic symphysis anatomic. Left proximal femur with dynamic fixation pin and intramedullary rosina. Distal tip of the intramedullary rosina outside the field of view. At least mild degenerative changes of the bilateral hips. Degenerative changes in the lumbar spine. Structure external to the patient between the legs, may represent a fecal or urinary management device. 1. Right femur with a subtle cortical defect at the lesser trochanter, which could represent a nearly nondisplaced fracture or degenerative change. Recommend CT for further evaluation. 2. Diffuse bone demineralization.  3. Postoperative changes of the left proximal femur. Results discussed by telephone with Lolly Hernández at 1:39 PM on 8/7/2020. Signed by Dr Daly Ozuna on 8/7/2020 1:40 PM        Assessment        Hospital Problems           Last Modified POA    Intractable pain 8/7/2020 Yes          Plan:        Intractable pain/status post mechanical fall/x-ray hip right abnormality/diffuse bone demineralization  - PT/OT  - Pain control modalities  - Vitamin D within normal limits  -Anticipate patient will be benefited from skilled nursing facility, case management for discharge disposition assistance     Elevated alkaline phosphatase  -Resolved  -Provide intravenous fluids     Reactive leukocytosis  -Resolved  -Likely related to stress of fall/pain  -Chest x-ray-no acute cardiopulmonary process     Chronic Benign Essential Hypertension   - Stable   - Continue current medications   - PRN medications ordered   - Will continue to monitor      Alzheimer's dementia  -Chronic condition, continue with Aricept     Depression/anxiety  -Chronic condition, continue SSRI    Electronically signed by   [facility] [place]   Internal Medicine Hospitalist  On 8/8/2020  At 10:01 AM    EMR Dragon/Transcription disclaimer:   Much of this encounter note is an electronic transcription/translation of spoken language to printed text.  The electronic translation of spoken language may permit erroneous, or at times, nonsensical words or phrases to be inadvertently transcribed; although attempts have made to review the note for such errors, some may still exist.

## 2020-08-08 NOTE — PROGRESS NOTES
Physical Therapy    Facility/Department: [facility] SURG SERVICES  Initial Assessment    NAME: Odette Ibarra  : 1928  MRN: [id number]    Date of Service: 2020    Discharge Recommendations:  Continue to assess pending progress        Assessment   Body structures, Functions, Activity limitations: Decreased functional mobility ; Decreased ADL status; Decreased ROM; Decreased strength;Decreased safe awareness;Decreased endurance;Decreased cognition;Decreased balance;Decreased posture; Increased pain  Assessment: ALL MOVEMENTS PAINFUL BUT ABLE TO SIT EOB AND TRANSFER TO CHAIR WITH ASSIST. INC PAIN WITH LE ADVANCEMENT IN STANDING. MAX VC'S FOR DIRECTION AND ENCOURAGEMENT. PT Education: PT Role;Plan of Care  REQUIRES PT FOLLOW UP: Yes  Activity Tolerance  Activity Tolerance: Patient limited by pain       Patient Diagnosis(es): The primary encounter diagnosis was Intractable pain. A diagnosis of Hypoxia was also pertinent to this visit. has a past medical history of Anxiety, Depression, Hx of blood clots, and Hypertension. has a past surgical history that includes Leg Surgery; Arm Surgery;  Appendectomy; and Hysterectomy, total abdominal.    Restrictions  Restrictions/Precautions  Restrictions/Precautions: Fall Risk  Vision/Hearing  Vision: Within Functional Limits  Hearing: Within functional limits     Subjective  General  Diagnosis: INTRACTABLE PAIN S/P FALL  Subjective  Subjective: Pt WILLING TO SIT UP IN CHAIR  Pain Screening  Patient Currently in Pain: Yes  Pain Assessment  Pain Assessment: Faces  Aguilar-Baker Pain Rating: Hurts even more  Pain Type: Acute pain  Pain Location: Hip  Pain Orientation: Left;Right  Pain Onset: (ACTIVE MOVEMENT)  Vital Signs  Patient Currently in Pain: Yes       Orientation  Orientation  Overall Orientation Status: Within Functional Limits  Social/Functional History  Social/Functional History  Lives With: Daughter  Home Equipment: Rolling walker  ADL Assistance: Independent  Ambulation Assistance: Independent(RW prn)  Transfer Assistance: Independent  Cognition   Cognition  Overall Cognitive Status: Exceptions  Arousal/Alertness: Appropriate responses to stimuli  Following Commands: Follows multistep commands with repitition  Attention Span: Appears intact  Safety Judgement: Decreased awareness of need for safety  Problem Solving: Assistance required to generate solutions  Insights: Decreased awareness of deficits  Initiation: Requires cues for some  Sequencing: Requires cues for some    Objective          PROM RLE (degrees)  RLE General PROM: DECREASED BILATERALLY DUE TO PAIN  Strength RLE  Comment: GROSSLY +3/5, HIPS 2/5        Bed mobility  Rolling to Right: Moderate assistance  Supine to Sit: Moderate assistance  Transfers  Sit to Stand: Minimal Assistance; Moderate Assistance  Stand to sit: Minimal Assistance; Moderate Assistance  Bed to Chair: Minimal assistance; Moderate assistance  Stand Pivot Transfers: Minimal Assistance; Moderate Assistance(RW)  Ambulation  Ambulation?: No     Balance  Sitting - Dynamic: Fair;+  Standing - Dynamic: Fair;-        Plan   Plan  Times per week: AT LEAST 5-7  Current Treatment Recommendations: Strengthening, ROM, Balance Training, Functional Mobility Training, Transfer Training, Gait Training, Patient/Caregiver Education & Training, Safety Education & Training  Safety Devices  Type of devices: Call light within reach    G-Code       OutComes Score                                                  AM-PAC Score             Goals  Short term goals  Time Frame for Short term goals: 14 DAYS  Short term goal 1: BED MOB MIN ASSIST  Short term goal 2: TRANSFERS CGA  Short term goal 3: ' RW CGA       Therapy Time   Individual Concurrent Group Co-treatment   Time In           Time Out           Minutes                   Meggan Kent PT

## 2020-08-08 NOTE — CARE COORDINATION
Pt's dtr is open to SNF but would prefer [facility] if patient was strong enough. I asked her to be thinking of SNF placement options for worst case and she said she will research them tonight. Will need pt/ot melony and MD recommendations.  Electronically signed by Ellis Werner on 8/8/2020 at 1:02 PM

## 2020-08-09 PROCEDURE — 1210000000 HC MED SURG R&B

## 2020-08-09 PROCEDURE — 2580000003 HC RX 258: Performed by: FAMILY MEDICINE

## 2020-08-09 PROCEDURE — 96376 TX/PRO/DX INJ SAME DRUG ADON: CPT

## 2020-08-09 PROCEDURE — 97530 THERAPEUTIC ACTIVITIES: CPT

## 2020-08-09 PROCEDURE — 6360000002 HC RX W HCPCS: Performed by: FAMILY MEDICINE

## 2020-08-09 PROCEDURE — 96372 THER/PROPH/DIAG INJ SC/IM: CPT

## 2020-08-09 PROCEDURE — 6370000000 HC RX 637 (ALT 250 FOR IP): Performed by: FAMILY MEDICINE

## 2020-08-09 RX ORDER — LORAZEPAM 0.5 MG/1
0.5 TABLET ORAL 2 TIMES DAILY PRN
Status: DISCONTINUED | OUTPATIENT
Start: 2020-08-09 | End: 2020-08-13

## 2020-08-09 RX ADMIN — SODIUM CHLORIDE, PRESERVATIVE FREE 10 ML: 5 INJECTION INTRAVENOUS at 10:27

## 2020-08-09 RX ADMIN — HYDROCODONE BITARTRATE AND ACETAMINOPHEN 2 TABLET: 5; 325 TABLET ORAL at 12:45

## 2020-08-09 RX ADMIN — HYDROCODONE BITARTRATE AND ACETAMINOPHEN 2 TABLET: 5; 325 TABLET ORAL at 17:54

## 2020-08-09 RX ADMIN — ENOXAPARIN SODIUM 40 MG: 40 INJECTION, SOLUTION INTRAVENOUS; SUBCUTANEOUS at 16:36

## 2020-08-09 RX ADMIN — LORAZEPAM 0.5 MG: 0.5 TABLET ORAL at 21:33

## 2020-08-09 RX ADMIN — HYDROCODONE BITARTRATE AND ACETAMINOPHEN 2 TABLET: 5; 325 TABLET ORAL at 08:18

## 2020-08-09 RX ADMIN — ONDANSETRON 4 MG: 2 INJECTION INTRAMUSCULAR; INTRAVENOUS at 14:51

## 2020-08-09 RX ADMIN — DICLOFENAC SODIUM 2 G: 10 GEL TOPICAL at 10:12

## 2020-08-09 RX ADMIN — DICLOFENAC SODIUM 2 G: 10 GEL TOPICAL at 21:21

## 2020-08-09 ASSESSMENT — ENCOUNTER SYMPTOMS
SHORTNESS OF BREATH: 0
SORE THROAT: 0
COUGH: 0
TROUBLE SWALLOWING: 0
VOMITING: 0
COLOR CHANGE: 0
WHEEZING: 0
NAUSEA: 0

## 2020-08-09 ASSESSMENT — PAIN SCALES - GENERAL
PAINLEVEL_OUTOF10: 10
PAINLEVEL_OUTOF10: 7
PAINLEVEL_OUTOF10: 3
PAINLEVEL_OUTOF10: 8

## 2020-08-09 NOTE — PROGRESS NOTES
Progress Note    Date:8/9/2020       Room:0513/513-01  Patient Jose Garvin     YOB: 1928     Age:89 y.o. Subjective   Interval History Status: improved. Patient seen and examined this a.m. getting set to work physical therapy. Informed by nursing staff of patient being extremely anxious and hesitant to put weight on the legs. Patient was also noted to be a two-person assist.   Patient currently denying any headache, change in vision, chest pain, shortness of breath, abdominal pain, nausea vomiting, fevers or chills. Cumulative hospital course: Patient admitted 8/7 status post mechanical fall suffered at daughter's home when attempting to rise from the toilet. Patient was able to get herself up off the ground usually ambulates with assistance of a wheeled walker. Patient received 4 mg dosing of morphine in the emergency room with no resolution of pain subsequently admitted to the surgical unit. Awaiting PT/OT evaluation, discussed case with daughter who wishes patient to return home upon discharge with home health services, social work consult also placed for assistance with Medicare/Medicaid set up. Review of Systems   Review of Systems   Constitutional: Positive for activity change. Negative for fatigue and fever. HENT: Negative for sore throat and trouble swallowing. Respiratory: Negative for cough, shortness of breath and wheezing. Cardiovascular: Negative for chest pain and leg swelling. Gastrointestinal: Negative for nausea and vomiting. Genitourinary: Negative for difficulty urinating. Musculoskeletal: Positive for arthralgias. Skin: Negative for color change and rash. Neurological: Positive for weakness. Negative for light-headedness and headaches. Psychiatric/Behavioral: The patient is nervous/anxious.          Baseline dementia, patient able to converse and answer all question       Medications   Scheduled Meds:    sodium chloride flush  10 mL Intravenous 2 times per day    enoxaparin  40 mg Subcutaneous Daily    diclofenac sodium  2 g Topical BID     Continuous Infusions:   PRN Meds: LORazepam, sodium chloride flush, acetaminophen **OR** acetaminophen, polyethylene glycol, promethazine **OR** ondansetron, potassium chloride **OR** potassium alternative oral replacement **OR** potassium chloride, magnesium sulfate, HYDROcodone 5 mg - acetaminophen **OR** HYDROcodone 5 mg - acetaminophen, morphine **OR** morphine, naloxone    Past History    Past Medical History:   has a past medical history of Anxiety, Depression, Hx of blood clots, and Hypertension. Social History:   reports that she has never smoked. She has never used smokeless tobacco. She reports that she does not drink alcohol or use drugs. Family History:   Family History   Family history unknown: Yes       Physical Examination      Vitals:  /61   Pulse 86   Temp 97.8 °F (36.6 °C) (Temporal)   Resp 18   Ht 5' 2\" (1.575 m)   Wt 125 lb (56.7 kg)   SpO2 90%   BMI 22.86 kg/m²   Temp (24hrs), Av.1 °F (36.7 °C), Min:97.3 °F (36.3 °C), Max:99.2 °F (37.3 °C)      I/O (24Hr): Intake/Output Summary (Last 24 hours) at 2020 1049  Last data filed at 2020 0214  Gross per 24 hour   Intake 100 ml   Output 250 ml   Net -150 ml       Physical Exam  Vitals signs and nursing note reviewed. Constitutional:       General: She is not in acute distress. Appearance: She is not toxic-appearing. HENT:      Head: Normocephalic and atraumatic. Nose: Nose normal.   Eyes:      General:         Right eye: No discharge. Left eye: No discharge. Conjunctiva/sclera: Conjunctivae normal.   Neck:      Musculoskeletal: Normal range of motion. Cardiovascular:      Rate and Rhythm: Normal rate and regular rhythm. Pulses: Normal pulses. Pulmonary:      Effort: Pulmonary effort is normal.      Breath sounds: No wheezing or rales.    Abdominal:      General: Bowel sounds are normal.      Tenderness: There is no abdominal tenderness. There is no guarding or rebound. Musculoskeletal:         General: Tenderness     Skin:     Capillary Refill: Capillary refill takes less than 2 seconds. Neurological:      General: No focal deficit present. Mental Status: She is alert and oriented to person, place, and time. Cranial Nerves: No cranial nerve deficit. Psychiatric:         Mood and Affect: Normal mood, baseline dementia, underlying anxiety appreciated  Labs/Imaging/Diagnostics   Labs:  CBC:  Recent Labs     08/07/20  1230 08/08/20  0304   WBC 14.6* 10.3   RBC 4.36 4.08*   HGB 13.1 12.3   HCT 40.0 37.6   MCV 91.7 92.2   RDW 12.6 12.8    215     CHEMISTRIES:  Recent Labs     08/07/20  1230 08/08/20  0304    141   K 4.1 3.8    104   CO2 25 24   BUN 14 14   CREATININE 0.7 0.8   GLUCOSE 96 106     PT/INR:  Recent Labs     08/07/20  1230   PROTIME 13.8   INR 1.07     APTT:  Recent Labs     08/07/20  1230   APTT 25.7*     LIVER PROFILE:  Recent Labs     08/07/20  1230 08/08/20  0304   AST 18 15   ALT 13 11   BILITOT 0.5 0.7   ALKPHOS 105* 93       Imaging Last 24 Hours:  Xr Shoulder Right (min 2 Views)    Result Date: 8/7/2020  EXAMINATION:  XR SHOULDER RIGHT (MIN 2 VIEWS)  8/7/2020 1:30 PM HISTORY: Pain and decreased range of motion post fall COMPARISON: No comparison study. TECHNIQUE: 3 views: Frontal imaging in internal/external rotation. Scapular Y view. FINDINGS: There are advanced osteophytic changes in the glenohumeral joint. Degenerative changes also noted in the [facility] joint. There is diffuse osteoporosis. There is no fracture or dislocation. 1. No acute osseous abnormality. 2. Osteoporosis. 3. Osteoarthritic changes Signed by Dr Jamie Fernandes on 8/7/2020 1:31 PM    Xr Knee Right (3 Views)    Result Date: 8/7/2020  EXAMINATION:  XR KNEE RIGHT (3 VIEWS)  8/7/2020 1:31 PM HISTORY: Right knee pain post fall COMPARISON: No comparison study.  TECHNIQUE: 2 views: AP and lateral projection imaging FINDINGS: The patella femoral and tibial relationships are maintained without fracture or dislocation. Osteoarthritic changes involving the medial joint space compartment identified with near complete loss of joint space, subchondral sclerosis and osteophyte formation. No significant joint effusion identified. 1. No acute osseous abnormality. Signed by Dr Bernice Billingsley on 8/7/2020 1:32 PM    Ct Head Wo Contrast    Result Date: 8/7/2020  EXAMINATION: CT HEAD WO CONTRAST 8/7/2020 1:30 PM HISTORY: CT BRAIN without contrast 8/7/2020 12:10 PM HISTORY: Confusion patient fell COMPARISON: None DLP: 716 mGy cm TECHNIQUE: Serial axial tomographic images of the brain were obtained without the use of intravenous contrast. FINDINGS: The midline structures are nondisplaced. There is moderate cerebral and cerebellar volume loss, with an associated increase in the prominence of the ventricles and sulci. The basilar cisterns are normal in size and configuration. There is no evidence of intracranial hemorrhage or mass-effect. There is low attenuation in the periventricular white matter, consistent with chronic ischemic change. There are no abnormal extra-axial fluid collections. There is no evidence of tonsillar herniation. The included orbits and their contents are unremarkable. The visualized paranasal sinuses, mastoid air cells and middle ear cavities are clear. The visualized osseous structures and overlying soft tissues of the skull and face are intact. Moderate cerebral and cerebellar volume loss with chronic microvascular disease but no evidence of acute intracranial process. Signed by Dr Sanjana Stewart on 8/7/2020 1:32 PM    Ct Cervical Spine Wo Contrast    Result Date: 8/7/2020  CT CERVICAL SPINE WO CONTRAST 8/7/2020 12:10 PM HISTORY: Neck pain post fall COMPARISON: None. DOSE: Radiation dose equals  mGy cm.  AUTOMATED EXPOSURE CONTROL dose reduction technique was implemented. TECHNIQUE:  2 mm sequential transaxial images were obtained. 2-D sagittal and coronal reconstruction images were generated. FINDINGS: [ There is osteoporosis. There is exaggerated lordosis of the upper cervical spine. There is facet arthropathy diffusely. The facets are appropriately aligned. The vertebral body heights are maintained. There is disc degeneration with near complete loss of disc space C6-C7 with disc degeneration also noted at C5-C6. Prevertebral soft tissues are normal. There is no CT evidence of acute fracture or subluxation. No significant canal stenosis. There is no CT evidence of posttraumatic disc herniation. There is multilevel foraminal narrowing. The upper lung fields are grossly clear. There are carotid artery calcifications. 1. No CT evidence of acute bony injury to the cervical spine. Signed by Dr Chantale Talavera on 8/7/2020 1:38 PM    Ct Pelvis Wo Contrast Additional Contrast? None    Result Date: 8/7/2020  EXAMINATION:  CT PELVIS WO CONTRAST  8/7/2020 3:02 PM HISTORY: Pain post fall COMPARISON: Right hip radiographs and abdomen pelvis CT dated 7/7/2020 TECHNIQUE: Radiation dose equals DLP 1554 mGy cm. AUTOMATED EXPOSURE CONTROL dose reduction technique was implemented. Thin section axial images were obtained without intravenous contrast. Multi projection reconstruction images were generated. FINDINGS: The right femoral head is imaged appropriately within the acetabulum. The proximal femur is intact without fracture. Changes from left femoral neck pinning identified. No left femur fracture observed. Bony pelvis is intact without fracture. Degenerative changes noted in the lower lumbar spine. There is osteoporosis. No definite acute soft tissue abnormalities associated with the right hip identified. There is extensive diverticulosis of the sigmoid colon. There are atherosclerotic aortoiliac calcifications. 1. No CT evidence of acute bony injury to the right hip/pelvis. Signed by Dr Meenakshi Chanel on 8/7/2020 3:06 PM    Xr Chest Portable    Result Date: 8/7/2020  XR CHEST PORTABLE 8/7/2020 11:23 AM HISTORY:   Hypertension. Trauma , fall Single view. COMPARISONS:  None FINDINGS: The level inspiration is shallow and lung volumes diminished. There are no parenchymal infiltrates, pleural effusions or pneumothoraces. Mild linear atelectasis suspected in the lung bases associated with the level of inspiration. The heart is within the upper limits of normal in size with ectasia the aorta calcifications aortic arch without evidence of heart failure. There is osteoporosis. Degenerative changes noted in the thoracic spine and glenohumeral joints. No definite acute osseous abnormality observed    No acute cardiopulmonary process. Signed by Dr Meenakshi Chanel on 8/7/2020 1:34 PM    Xr Hip 2-3 Vw W Pelvis Right    Result Date: 8/7/2020  EXAM: XR HIP 2-3 VW W PELVIS RIGHT -- 8/7/2020 11:23 AM HISTORY: 89 years, Female, pain, spasm-like effect, right-sided pain, status post fall. COMPARISON: No existing relevant imaging studies available TECHNIQUE:  3 images. AP pelvis, AP and lateral views the right hip FINDINGS:  Diffuse bone demineralization. Right femur with a subtle cortical defect at the lesser trochanter, which could represent degenerative change or nearly nondisplaced fracture. Right hip normally aligned. Sacroiliac joints grossly symmetric. Sacral arcuate lines limited in assessment due to bony demineralization and overlying bowel gas. Pubic symphysis anatomic. Left proximal femur with dynamic fixation pin and intramedullary rosina. Distal tip of the intramedullary rosina outside the field of view. At least mild degenerative changes of the bilateral hips. Degenerative changes in the lumbar spine. Structure external to the patient between the legs, may represent a fecal or urinary management device.     1. Right femur with a subtle cortical defect at the lesser trochanter, which could represent a nearly nondisplaced fracture or degenerative change. Recommend CT for further evaluation. 2. Diffuse bone demineralization. 3. Postoperative changes of the left proximal femur. Results discussed by telephone with Lisette Maynard at 1:39 PM on 8/7/2020. Signed by Dr Matty Delong on 8/7/2020 1:40 PM        Assessment        Hospital Problems           Last Modified POA    Intractable pain 8/7/2020 Yes          Plan:        Intractable pain/status post mechanical fall/x-ray hip right abnormality/diffuse bone demineralization  - PT/OT  - Pain control modalities  - Vitamin D within normal limits  - Patient was also noted to be a two-person assist.   -Anticipate patient will be benefited from skilled nursing facility, case management for discharge disposition assistance     Elevated alkaline phosphatase  -Resolved  -Provide intravenous fluids     Reactive leukocytosis  -Resolved  -Likely related to stress of fall/pain  -Chest x-ray-no acute cardiopulmonary process     Chronic Benign Essential Hypertension   - Stable   - Continue current medications   - PRN medications ordered   - Will continue to monitor      Alzheimer's dementia  -Chronic condition, continue with Aricept     Depression/anxiety  -Chronic condition, continue SSRI  -Addition of Ativan 0.5 mg twice daily as needed        Electronically signed by   Marce Verde   Internal Medicine Hospitalist  On 8/9/2020  At 10:49 AM    EMR Dragon/Transcription disclaimer:   Much of this encounter note is an electronic transcription/translation of spoken language to printed text.  The electronic translation of spoken language may permit erroneous, or at times, nonsensical words or phrases to be inadvertently transcribed; although attempts have made to review the note for such errors, some may still exist.

## 2020-08-09 NOTE — PROGRESS NOTES
08/09/20 0947   Restrictions/Precautions   Restrictions/Precautions Fall Risk   Subjective   Subjective OH its going to hurt  I can't   General Comment   Comments Patient became anxious shaking and crying with fear of falling. Patient sat EOB x 15 minutes MIN/CGA   claudette Wise RN tried to calm patient down. Patient agreed to TR.    Pain Screening   Patient Currently in Pain Yes   Pain Assessment   Pain Assessment Faces   Aguilar-Baker Pain Rating 8   Oxygen Therapy   O2 Device None (Room air)   Orientation   Overall Orientation Status WFL   Transfers   Sit to Stand Maximum Assistance;2 Person Assistance   Stand to sit Maximum Assistance;2 Person Assistance   Bed to Chair Dependent/Total;2 Person Assistance   Comment Patient could assist with TR  if she overcomes anxiety   Activity Tolerance   Activity Tolerance Patient limited by pain;Treatment limited secondary to medical complications (free text)   Activity Tolerance Limited by anxiety   Safety Devices   Type of devices Call light within reach   Physical Therapy    Electronically signed by Sigifredo Villatoro PTA on 8/9/2020 at 9:55 AM

## 2020-08-09 NOTE — PROGRESS NOTES
Physical Therapy    Newton Delacruz  [id number]       08/09/20 1348   Restrictions/Precautions   Restrictions/Precautions Fall Risk   General   Diagnosis INTRACTABLE PAIN S/P FALL   Subjective   Subjective Pt calm initially but once standing becomes increasingly anxious. Requires constant cueing and reassurance   Pain Assessment   Pain Assessment Faces   Aguilar-Shaw Pain Rating 4   Pain Type Acute pain   Pain Location Hip   Pain Orientation Right;Left   Bed Mobility   Sit to Supine Maximal assistance   Transfers   Sit to Stand Minimal Assistance; Moderate Assistance  (leans back initially, requires assist to achieve midline)   Stand to sit Minimal Assistance; Moderate Assistance   Bed to Chair Minimal assistance; Moderate assistance   Stand Pivot Transfers Minimal Assistance; Moderate Assistance  (RW, few small steps to pivot)   Balance   Sitting - Dynamic Fair;+   Standing - Dynamic Fair;-   Short term goals   Time Frame for Short term goals 14 DAYS   Short term goal 1 BED MOB MIN ASSIST   Short term goal 2 TRANSFERS CGA   Short term goal 3 ' RW CGA   Conditions Requiring Skilled Therapeutic Intervention   Assessment Improved from am but main hindrance is pt's cognitive state and anxiety. Discussion with dtr regarding d/c plan. Discharge Recommendations Patient would benefit from continued therapy after discharge   Activity Tolerance   Activity Tolerance Patient limited by cognitive status; Patient limited by pain   Plan   Times per week AT LEAST 5-7   Current Treatment Recommendations Strengthening;ROM;Balance Training;Functional Mobility Training;Transfer Training;Gait Training;Patient/Caregiver Education & Training; Safety Education & Training     Electronically signed by Cris Montoya PT on 8/9/2020 at 1:54 PM

## 2020-08-09 NOTE — PROGRESS NOTES
Helped therapy get patient in chair and patient was very anxious and hesitant to get up after much encouragement from myself and therapy patient was finely able to make it up to the chair. Patient remained very anxious through entire process. Contacted Dr Bakari Garrido and asked for something to help nerves he said he would review chart. Awaiting any new orders at this time.

## 2020-08-10 LAB
ALBUMIN SERPL-MCNC: 3.2 G/DL (ref 3.5–5.2)
ALP BLD-CCNC: 84 U/L (ref 35–104)
ALT SERPL-CCNC: 8 U/L (ref 5–33)
ANION GAP SERPL CALCULATED.3IONS-SCNC: 9 MMOL/L (ref 7–19)
AST SERPL-CCNC: 11 U/L (ref 5–32)
BILIRUB SERPL-MCNC: 0.5 MG/DL (ref 0.2–1.2)
BUN BLDV-MCNC: 15 MG/DL (ref 8–23)
CALCIUM SERPL-MCNC: 8.7 MG/DL (ref 8.2–9.6)
CHLORIDE BLD-SCNC: 105 MMOL/L (ref 98–111)
CO2: 26 MMOL/L (ref 22–29)
CREAT SERPL-MCNC: 0.6 MG/DL (ref 0.5–0.9)
EKG P AXIS: 24 DEGREES
EKG P-R INTERVAL: 176 MS
EKG Q-T INTERVAL: 398 MS
EKG QRS DURATION: 88 MS
EKG QTC CALCULATION (BAZETT): 424 MS
EKG T AXIS: 55 DEGREES
GFR AFRICAN AMERICAN: >59
GFR NON-AFRICAN AMERICAN: >60
GLUCOSE BLD-MCNC: 100 MG/DL (ref 74–109)
HCT VFR BLD CALC: 35.9 % (ref 37–47)
HEMOGLOBIN: 11.9 G/DL (ref 12–16)
MCH RBC QN AUTO: 30.1 PG (ref 27–31)
MCHC RBC AUTO-ENTMCNC: 33.1 G/DL (ref 33–37)
MCV RBC AUTO: 90.9 FL (ref 81–99)
PDW BLD-RTO: 12.8 % (ref 11.5–14.5)
PLATELET # BLD: 196 K/UL (ref 130–400)
PMV BLD AUTO: 9.8 FL (ref 9.4–12.3)
POTASSIUM SERPL-SCNC: 4 MMOL/L (ref 3.5–5)
RBC # BLD: 3.95 M/UL (ref 4.2–5.4)
SARS-COV-2, PCR: NOT DETECTED
SODIUM BLD-SCNC: 140 MMOL/L (ref 136–145)
TOTAL PROTEIN: 5.7 G/DL (ref 6.6–8.7)
WBC # BLD: 7.4 K/UL (ref 4.8–10.8)

## 2020-08-10 PROCEDURE — 93010 ELECTROCARDIOGRAM REPORT: CPT | Performed by: INTERNAL MEDICINE

## 2020-08-10 PROCEDURE — 1210000000 HC MED SURG R&B

## 2020-08-10 PROCEDURE — 97165 OT EVAL LOW COMPLEX 30 MIN: CPT

## 2020-08-10 PROCEDURE — 85027 COMPLETE CBC AUTOMATED: CPT

## 2020-08-10 PROCEDURE — 97116 GAIT TRAINING THERAPY: CPT

## 2020-08-10 PROCEDURE — 97110 THERAPEUTIC EXERCISES: CPT

## 2020-08-10 PROCEDURE — 96372 THER/PROPH/DIAG INJ SC/IM: CPT

## 2020-08-10 PROCEDURE — 36415 COLL VENOUS BLD VENIPUNCTURE: CPT

## 2020-08-10 PROCEDURE — 97530 THERAPEUTIC ACTIVITIES: CPT

## 2020-08-10 PROCEDURE — U0003 INFECTIOUS AGENT DETECTION BY NUCLEIC ACID (DNA OR RNA); SEVERE ACUTE RESPIRATORY SYNDROME CORONAVIRUS 2 (SARS-COV-2) (CORONAVIRUS DISEASE [COVID-19]), AMPLIFIED PROBE TECHNIQUE, MAKING USE OF HIGH THROUGHPUT TECHNOLOGIES AS DESCRIBED BY CMS-2020-01-R: HCPCS

## 2020-08-10 PROCEDURE — 2580000003 HC RX 258: Performed by: FAMILY MEDICINE

## 2020-08-10 PROCEDURE — 6360000002 HC RX W HCPCS: Performed by: FAMILY MEDICINE

## 2020-08-10 PROCEDURE — 80053 COMPREHEN METABOLIC PANEL: CPT

## 2020-08-10 RX ORDER — LORAZEPAM 0.5 MG/1
TABLET ORAL
Qty: 30 TABLET | Refills: 0 | Status: SHIPPED | OUTPATIENT
Start: 2020-08-10 | End: 2020-08-13 | Stop reason: SDUPTHER

## 2020-08-10 RX ADMIN — DICLOFENAC SODIUM 2 G: 10 GEL TOPICAL at 20:28

## 2020-08-10 RX ADMIN — DICLOFENAC SODIUM 2 G: 10 GEL TOPICAL at 09:47

## 2020-08-10 RX ADMIN — ENOXAPARIN SODIUM 40 MG: 40 INJECTION, SOLUTION INTRAVENOUS; SUBCUTANEOUS at 19:14

## 2020-08-10 RX ADMIN — SODIUM CHLORIDE, PRESERVATIVE FREE 10 ML: 5 INJECTION INTRAVENOUS at 20:28

## 2020-08-10 RX ADMIN — SODIUM CHLORIDE, PRESERVATIVE FREE 10 ML: 5 INJECTION INTRAVENOUS at 09:47

## 2020-08-10 ASSESSMENT — ENCOUNTER SYMPTOMS
SHORTNESS OF BREATH: 0
COUGH: 0
COLOR CHANGE: 0
NAUSEA: 0
TROUBLE SWALLOWING: 0
WHEEZING: 0
VOMITING: 0
SORE THROAT: 0

## 2020-08-10 ASSESSMENT — PAIN DESCRIPTION - PAIN TYPE
TYPE: ACUTE PAIN
TYPE: ACUTE PAIN

## 2020-08-10 ASSESSMENT — PAIN DESCRIPTION - LOCATION
LOCATION: HIP
LOCATION: HIP

## 2020-08-10 ASSESSMENT — PAIN DESCRIPTION - FREQUENCY: FREQUENCY: INTERMITTENT

## 2020-08-10 ASSESSMENT — PAIN DESCRIPTION - ORIENTATION
ORIENTATION: RIGHT

## 2020-08-10 ASSESSMENT — PAIN SCALES - GENERAL: PAINLEVEL_OUTOF10: 3

## 2020-08-10 ASSESSMENT — PAIN - FUNCTIONAL ASSESSMENT: PAIN_FUNCTIONAL_ASSESSMENT: PREVENTS OR INTERFERES WITH MANY ACTIVE NOT PASSIVE ACTIVITIES

## 2020-08-10 ASSESSMENT — PAIN DESCRIPTION - ONSET: ONSET: ON-GOING

## 2020-08-10 NOTE — PROGRESS NOTES
Occupational Therapy   Occupational Therapy Initial Assessment  Date: 8/10/2020   Patient Name: Dinesh Moore  MRN: [id number]     : 1928    Date of Service: 8/10/2020    Discharge Recommendations:          Assessment   Performance deficits / Impairments: Decreased functional mobility ; Decreased ADL status; Decreased balance;Decreased endurance;Decreased cognition;Decreased safe awareness  Assessment: Will progress as tolerated. May need additional therapy before returning home with daughter  Treatment Diagnosis: Intractable pain RLE  Prognosis: Good  REQUIRES OT FOLLOW UP: Yes  Activity Tolerance  Activity Tolerance: Patient Tolerated treatment well           Patient Diagnosis(es): The primary encounter diagnosis was Intractable pain. A diagnosis of Hypoxia was also pertinent to this visit. has a past medical history of Anxiety, Depression, Hx of blood clots, and Hypertension. has a past surgical history that includes Leg Surgery; Arm Surgery;  Appendectomy; and Hysterectomy, total abdominal.    Treatment Diagnosis: Intractable pain RLE      Restrictions  Restrictions/Precautions  Restrictions/Precautions: Fall Risk    Subjective   General  Chart Reviewed: Yes  Patient assessed for rehabilitation services?: Yes  Additional Pertinent Hx: Pt. fell at home and was taken to ER  Family / Caregiver Present: No  Diagnosis: Intractable pain RLE s/p fall  Patient Currently in Pain: Yes  Pain Assessment  Pain Assessment: Faces  Aguilar-Baker Pain Rating: Hurts little more  Pain Type: Acute pain  Pain Location: Hip  Pain Orientation: Right  Vital Signs  Level of Consciousness: (P) Alert  Patient Currently in Pain: Yes  Social/Functional History  Social/Functional History  Lives With: Daughter  Type of Home: House  Home Equipment: Rolling walker  ADL Assistance: Independent  Ambulation Assistance: Independent  Transfer Assistance: Independent       Objective   Vision: Within Functional Limits  Hearing: Within functional Simi Record, OTR/L

## 2020-08-10 NOTE — PROGRESS NOTES
Physical Therapy   Name: Lolly Esparza  MRN:  [id number]  Date of service:  8/10/2020     08/10/20 0930   Restrictions/Precautions   Restrictions/Precautions Fall Risk   Subjective   Subjective Pt. willing to work with PT. When asked if she was hurting, she states she is mainly tired. General Comment   Comments Helene EASTMAN PT. Pain Screening   Patient Currently in Pain Yes   Pain Assessment   Pain Assessment Faces   Aguilar-Shaw Pain Rating 4   Pain Type Acute pain   Pain Location Hip   Pain Orientation Right   Non-Pharmaceutical Pain Intervention(s) Repositioned; Rest   Oxygen Therapy   O2 Device None (Room air)   Transfers   Sit to Stand Minimal Assistance;2 Person Assistance   Stand to sit Minimal Assistance;2 Person Assistance   Bed to Chair Minimal assistance;2 Person Assistance   Stand Pivot Transfers Minimal Assistance;2 Person Assistance   Comment Pt. needed v. cues for use of RW and technique for offloading RLE to help with pain, hand placement on RW. Ambulation   Ambulation? Yes   WB Status WBAT   Ambulation 1   Surface level tile   Device Rolling Walker   Assistance Minimal assistance;2 Person assistance   Quality of Gait slightly unsteady, antalgc RLE   Gait Deviations Decreased step length;Decreased step height   Distance 2'   Comments to chair   Balance   Posture Fair   Sitting - Static Good   Sitting - Dynamic Fair;+   Standing - Static Fair;+   Standing - Dynamic Fair;-   Exercises   Quad Sets x10   Gluteal Sets x10   Hip Abduction x10   Ankle Pumps x10   Comments hip flexion RLE AAROM x10, AROM LLE x 10   Short term goals   Time Frame for Short term goals 14 DAYS   Short term goal 1 BED MOB MIN ASSIST   Short term goal 2 TRANSFERS CGA   Short term goal 3 ' RW CGA   Conditions Requiring Skilled Therapeutic Intervention   Body structures, Functions, Activity limitations Decreased functional mobility ; Decreased ADL status; Decreased ROM; Decreased strength;Decreased safe awareness;Decreased

## 2020-08-10 NOTE — CARE COORDINATION
SW met with Pt/family regarding SNF options within the area. Pt/family agreed to be listed with [facility].  Awaiting approval/denial.  [facility]   M   F  Electronically signed by Bernie Fallon on 8/10/2020 at 10:48 AM

## 2020-08-10 NOTE — PROGRESS NOTES
sodium chloride flush, acetaminophen **OR** acetaminophen, polyethylene glycol, promethazine **OR** ondansetron, potassium chloride **OR** potassium alternative oral replacement **OR** potassium chloride, magnesium sulfate, HYDROcodone 5 mg - acetaminophen **OR** HYDROcodone 5 mg - acetaminophen, morphine **OR** morphine, naloxone    Past History    Past Medical History:   has a past medical history of Anxiety, Depression, Hx of blood clots, and Hypertension. Social History:   reports that she has never smoked. She has never used smokeless tobacco. She reports that she does not drink alcohol or use drugs. Family History:   Family History   Family history unknown: Yes       Physical Examination      Vitals:  /66   Pulse 60   Temp 97.2 °F (36.2 °C) (Temporal)   Resp 16   Ht 5' 2\" (1.575 m)   Wt 125 lb (56.7 kg)   SpO2 93%   BMI 22.86 kg/m²   Temp (24hrs), Av.3 °F (36.3 °C), Min:96.1 °F (35.6 °C), Max:98 °F (36.7 °C)      I/O (24Hr): Intake/Output Summary (Last 24 hours) at 8/10/2020 1128  Last data filed at 8/10/2020 0409  Gross per 24 hour   Intake 150 ml   Output 575 ml   Net -425 ml       Physical Exam  Vitals signs and nursing note reviewed. Constitutional:       General: She is not in acute distress. Appearance: She is not toxic-appearing. HENT:      Head: Normocephalic and atraumatic. Nose: Nose normal.   Eyes:      General:         Right eye: No discharge. Left eye: No discharge. Conjunctiva/sclera: Conjunctivae normal.   Neck:      Musculoskeletal: Tenderness  Cardiovascular:      Rate and Rhythm: Normal rate and regular rhythm. Pulses: Normal pulses. Pulmonary:      Effort: Pulmonary effort is normal.      Breath sounds: No wheezing or rales. Abdominal:      General: Bowel sounds are normal.      Tenderness: There is no abdominal tenderness. There is no guarding or rebound.    Musculoskeletal:         General: Improved tenderness, improve range of motion   Skin:     Capillary Refill: Capillary refill takes less than 2 seconds. Neurological:      General: No focal deficit present. Mental Status: She is alert and oriented to person, place, and time. Cranial Nerves: No cranial nerve deficit. Psychiatric:         Mood and Affect: Normal mood, baseline dementia, underlying anxiety appreciated    Labs/Imaging/Diagnostics   Labs:  CBC:  Recent Labs     08/07/20  1230 08/08/20  0304 08/10/20  0621   WBC 14.6* 10.3 7.4   RBC 4.36 4.08* 3.95*   HGB 13.1 12.3 11.9*   HCT 40.0 37.6 35.9*   MCV 91.7 92.2 90.9   RDW 12.6 12.8 12.8    215 196     CHEMISTRIES:  Recent Labs     08/07/20  1230 08/08/20  0304 08/10/20  0621    141 140   K 4.1 3.8 4.0    104 105   CO2 25 24 26   BUN 14 14 15   CREATININE 0.7 0.8 0.6   GLUCOSE 96 106 100     PT/INR:  Recent Labs     08/07/20  1230   PROTIME 13.8   INR 1.07     APTT:  Recent Labs     08/07/20  1230   APTT 25.7*     LIVER PROFILE:  Recent Labs     08/07/20  1230 08/08/20  0304 08/10/20  0621   AST 18 15 11   ALT 13 11 8   BILITOT 0.5 0.7 0.5   ALKPHOS 105* 93 84       Imaging Last 24 Hours:  Xr Shoulder Right (min 2 Views)    Result Date: 8/7/2020  EXAMINATION:  XR SHOULDER RIGHT (MIN 2 VIEWS)  8/7/2020 1:30 PM HISTORY: Pain and decreased range of motion post fall COMPARISON: No comparison study. TECHNIQUE: 3 views: Frontal imaging in internal/external rotation. Scapular Y view. FINDINGS: There are advanced osteophytic changes in the glenohumeral joint. Degenerative changes also noted in the [facility] joint. There is diffuse osteoporosis. There is no fracture or dislocation. 1. No acute osseous abnormality. 2. Osteoporosis. 3. Osteoarthritic changes Signed by Dr Kerri Mahmood on 8/7/2020 1:31 PM    Xr Knee Right (3 Views)    Result Date: 8/7/2020  EXAMINATION:  XR KNEE RIGHT (3 VIEWS)  8/7/2020 1:31 PM HISTORY: Right knee pain post fall COMPARISON: No comparison study.  TECHNIQUE: 2 views: AP and lateral projection imaging FINDINGS: The patella femoral and tibial relationships are maintained without fracture or dislocation. Osteoarthritic changes involving the medial joint space compartment identified with near complete loss of joint space, subchondral sclerosis and osteophyte formation. No significant joint effusion identified. 1. No acute osseous abnormality. Signed by Dr Leija Monday on 8/7/2020 1:32 PM    Ct Head Wo Contrast    Result Date: 8/7/2020  EXAMINATION: CT HEAD WO CONTRAST 8/7/2020 1:30 PM HISTORY: CT BRAIN without contrast 8/7/2020 12:10 PM HISTORY: Confusion patient fell COMPARISON: None DLP: 716 mGy cm TECHNIQUE: Serial axial tomographic images of the brain were obtained without the use of intravenous contrast. FINDINGS: The midline structures are nondisplaced. There is moderate cerebral and cerebellar volume loss, with an associated increase in the prominence of the ventricles and sulci. The basilar cisterns are normal in size and configuration. There is no evidence of intracranial hemorrhage or mass-effect. There is low attenuation in the periventricular white matter, consistent with chronic ischemic change. There are no abnormal extra-axial fluid collections. There is no evidence of tonsillar herniation. The included orbits and their contents are unremarkable. The visualized paranasal sinuses, mastoid air cells and middle ear cavities are clear. The visualized osseous structures and overlying soft tissues of the skull and face are intact. Moderate cerebral and cerebellar volume loss with chronic microvascular disease but no evidence of acute intracranial process. Signed by Dr Lilia Sorenson on 8/7/2020 1:32 PM    Ct Cervical Spine Wo Contrast    Result Date: 8/7/2020  CT CERVICAL SPINE WO CONTRAST 8/7/2020 12:10 PM HISTORY: Neck pain post fall COMPARISON: None. DOSE: Radiation dose equals  mGy cm. AUTOMATED EXPOSURE CONTROL dose reduction technique was implemented.  TECHNIQUE:  2 mm sequential transaxial images were obtained. 2-D sagittal and coronal reconstruction images were generated. FINDINGS: [ There is osteoporosis. There is exaggerated lordosis of the upper cervical spine. There is facet arthropathy diffusely. The facets are appropriately aligned. The vertebral body heights are maintained. There is disc degeneration with near complete loss of disc space C6-C7 with disc degeneration also noted at C5-C6. Prevertebral soft tissues are normal. There is no CT evidence of acute fracture or subluxation. No significant canal stenosis. There is no CT evidence of posttraumatic disc herniation. There is multilevel foraminal narrowing. The upper lung fields are grossly clear. There are carotid artery calcifications. 1. No CT evidence of acute bony injury to the cervical spine. Signed by Dr Barbi Khan on 8/7/2020 1:38 PM    Ct Pelvis Wo Contrast Additional Contrast? None    Result Date: 8/7/2020  EXAMINATION:  CT PELVIS WO CONTRAST  8/7/2020 3:02 PM HISTORY: Pain post fall COMPARISON: Right hip radiographs and abdomen pelvis CT dated 7/7/2020 TECHNIQUE: Radiation dose equals DLP 1554 mGy cm. AUTOMATED EXPOSURE CONTROL dose reduction technique was implemented. Thin section axial images were obtained without intravenous contrast. Multi projection reconstruction images were generated. FINDINGS: The right femoral head is imaged appropriately within the acetabulum. The proximal femur is intact without fracture. Changes from left femoral neck pinning identified. No left femur fracture observed. Bony pelvis is intact without fracture. Degenerative changes noted in the lower lumbar spine. There is osteoporosis. No definite acute soft tissue abnormalities associated with the right hip identified. There is extensive diverticulosis of the sigmoid colon. There are atherosclerotic aortoiliac calcifications. 1. No CT evidence of acute bony injury to the right hip/pelvis.  Signed by Dr Barbi Khan on 8/7/2020 3:06 PM    Xr Chest Portable    Result Date: 8/7/2020  XR CHEST PORTABLE 8/7/2020 11:23 AM HISTORY:   Hypertension. Trauma , fall Single view. COMPARISONS:  None FINDINGS: The level inspiration is shallow and lung volumes diminished. There are no parenchymal infiltrates, pleural effusions or pneumothoraces. Mild linear atelectasis suspected in the lung bases associated with the level of inspiration. The heart is within the upper limits of normal in size with ectasia the aorta calcifications aortic arch without evidence of heart failure. There is osteoporosis. Degenerative changes noted in the thoracic spine and glenohumeral joints. No definite acute osseous abnormality observed    No acute cardiopulmonary process. Signed by Dr Meenakshi Chanel on 8/7/2020 1:34 PM    Xr Hip 2-3 Vw W Pelvis Right    Result Date: 8/7/2020  EXAM: XR HIP 2-3 VW W PELVIS RIGHT -- 8/7/2020 11:23 AM HISTORY: 89 years, Female, pain, spasm-like effect, right-sided pain, status post fall. COMPARISON: No existing relevant imaging studies available TECHNIQUE:  3 images. AP pelvis, AP and lateral views the right hip FINDINGS:  Diffuse bone demineralization. Right femur with a subtle cortical defect at the lesser trochanter, which could represent degenerative change or nearly nondisplaced fracture. Right hip normally aligned. Sacroiliac joints grossly symmetric. Sacral arcuate lines limited in assessment due to bony demineralization and overlying bowel gas. Pubic symphysis anatomic. Left proximal femur with dynamic fixation pin and intramedullary rosina. Distal tip of the intramedullary rosina outside the field of view. At least mild degenerative changes of the bilateral hips. Degenerative changes in the lumbar spine. Structure external to the patient between the legs, may represent a fecal or urinary management device.     1. Right femur with a subtle cortical defect at the lesser trochanter, which could represent a nearly nondisplaced fracture or degenerative change. Recommend CT for further evaluation. 2. Diffuse bone demineralization. 3. Postoperative changes of the left proximal femur. Results discussed by telephone with Roxy Patel at 1:39 PM on 8/7/2020. Signed by Dr Chasity Tomlin on 8/7/2020 1:40 PM        Assessment        Hospital Problems           Last Modified POA    Intractable pain 8/7/2020 Yes          Plan:        Intractable pain/status post mechanical fall/x-ray hip right abnormality/diffuse bone demineralization  - PT/OT  - Pain control modalities  - Vitamin D within normal limits  - Patient was also noted to be a two-person assist. -After PT evaluation 8/9 daughter on board with skilled nursing facility placement, [facility]       Elevated alkaline phosphatase  -Resolved  -Provide intravenous fluids     Reactive leukocytosis  -Resolved  -Likely related to stress of fall/pain  -Chest x-ray-no acute cardiopulmonary process     Chronic Benign Essential Hypertension   - Stable   - Continue current medications   - PRN medications ordered   - Will continue to monitor      Alzheimer's dementia  -Chronic condition, continue with Aricept     Depression/anxiety  -Chronic condition, continue SSRI  -Addition of Ativan 0.5 mg twice daily as needed        Electronically signed by   Nandini Burnette   Internal Medicine Hospitalist  On 8/10/2020  At 11:28 AM    EMR Dragon/Transcription disclaimer:   Much of this encounter note is an electronic transcription/translation of spoken language to printed text.  The electronic translation of spoken language may permit erroneous, or at times, nonsensical words or phrases to be inadvertently transcribed; although attempts have made to review the note for such errors, some may still exist.

## 2020-08-10 NOTE — PROGRESS NOTES
08/10/20 1401   Subjective   Subjective I don't want to go back to bed now I am comfortable here.    General Comment   Comments Patient wanted to stay up in chair   Physical Therapy    Electronically signed by Matt Mena PTA on 8/10/2020 at 2:04 PM

## 2020-08-10 NOTE — CARE COORDINATION
[facility] was not in network with [employer] [facility]. SRUTHI met with Pt/family to discuss other options. Pt/family requested placement with [address] [place] [address] and [address] [place] [address].  Awaiting approval/denial.  Electronically signed by Bernie Fallon on 8/10/2020 at 2:13 PM

## 2020-08-11 PROCEDURE — 6370000000 HC RX 637 (ALT 250 FOR IP): Performed by: PHYSICIAN ASSISTANT

## 2020-08-11 PROCEDURE — 96372 THER/PROPH/DIAG INJ SC/IM: CPT

## 2020-08-11 PROCEDURE — 6360000002 HC RX W HCPCS: Performed by: PHYSICIAN ASSISTANT

## 2020-08-11 PROCEDURE — 96375 TX/PRO/DX INJ NEW DRUG ADDON: CPT

## 2020-08-11 PROCEDURE — 97116 GAIT TRAINING THERAPY: CPT

## 2020-08-11 PROCEDURE — 2580000003 HC RX 258: Performed by: FAMILY MEDICINE

## 2020-08-11 PROCEDURE — 1210000000 HC MED SURG R&B

## 2020-08-11 PROCEDURE — 6360000002 HC RX W HCPCS: Performed by: FAMILY MEDICINE

## 2020-08-11 RX ORDER — LISINOPRIL 10 MG/1
10 TABLET ORAL DAILY
Status: DISCONTINUED | OUTPATIENT
Start: 2020-08-11 | End: 2020-08-12

## 2020-08-11 RX ORDER — HYDRALAZINE HYDROCHLORIDE 20 MG/ML
5 INJECTION INTRAMUSCULAR; INTRAVENOUS EVERY 6 HOURS PRN
Status: DISCONTINUED | OUTPATIENT
Start: 2020-08-11 | End: 2020-08-13 | Stop reason: HOSPADM

## 2020-08-11 RX ORDER — DONEPEZIL HYDROCHLORIDE 5 MG/1
10 TABLET, FILM COATED ORAL NIGHTLY
Status: DISCONTINUED | OUTPATIENT
Start: 2020-08-11 | End: 2020-08-13 | Stop reason: HOSPADM

## 2020-08-11 RX ORDER — ESCITALOPRAM OXALATE 10 MG/1
10 TABLET ORAL DAILY
Status: DISCONTINUED | OUTPATIENT
Start: 2020-08-11 | End: 2020-08-13 | Stop reason: HOSPADM

## 2020-08-11 RX ADMIN — ENOXAPARIN SODIUM 40 MG: 40 INJECTION, SOLUTION INTRAVENOUS; SUBCUTANEOUS at 18:26

## 2020-08-11 RX ADMIN — ESCITALOPRAM OXALATE 10 MG: 10 TABLET ORAL at 20:08

## 2020-08-11 RX ADMIN — DONEPEZIL HYDROCHLORIDE 10 MG: 5 TABLET, FILM COATED ORAL at 20:08

## 2020-08-11 RX ADMIN — LISINOPRIL 10 MG: 10 TABLET ORAL at 20:08

## 2020-08-11 RX ADMIN — HYDRALAZINE HYDROCHLORIDE 5 MG: 20 INJECTION INTRAMUSCULAR; INTRAVENOUS at 22:38

## 2020-08-11 RX ADMIN — SODIUM CHLORIDE, PRESERVATIVE FREE 10 ML: 5 INJECTION INTRAVENOUS at 20:09

## 2020-08-11 RX ADMIN — DICLOFENAC SODIUM 2 G: 10 GEL TOPICAL at 20:07

## 2020-08-11 ASSESSMENT — PAIN DESCRIPTION - PAIN TYPE: TYPE: ACUTE PAIN

## 2020-08-11 ASSESSMENT — PAIN DESCRIPTION - LOCATION: LOCATION: HIP

## 2020-08-11 ASSESSMENT — ENCOUNTER SYMPTOMS
WHEEZING: 0
TROUBLE SWALLOWING: 0
NAUSEA: 0
SORE THROAT: 0
COUGH: 0
SHORTNESS OF BREATH: 0
COLOR CHANGE: 0
VOMITING: 0

## 2020-08-11 ASSESSMENT — PAIN DESCRIPTION - ORIENTATION: ORIENTATION: RIGHT

## 2020-08-11 ASSESSMENT — PAIN SCALES - GENERAL: PAINLEVEL_OUTOF10: 0

## 2020-08-11 NOTE — CARE COORDINATION
Pre-cert has been started for [facility] since Pt is able to visit there compared to other facilities in Pt network. Admissions will notify SW once pre-cert clears.   [facility]  [phone] F  Electronically signed by Shauna Her on 8/11/2020 at 12:37 PM

## 2020-08-11 NOTE — PROGRESS NOTES
08/11/20 1514   Restrictions/Precautions   Restrictions/Precautions Fall Risk   Subjective   Subjective Patient sitting EOB PCA present   General Comment   Comments Patient wants to go to recliner   Pain Screening   Patient Currently in Pain No   Vital Signs   Level of Consciousness 0   Oxygen Therapy   O2 Device None (Room air)   Patient Observation   Observations Patient dressed thinks she is going home but DC cancelled awaiting Pre-cert   Transfers   Sit to Stand Minimal Assistance   Stand to sit Minimal Assistance   Comment Patient needs cues to hold on to RW and not grab objects to hold on to   Ambulation   Ambulation? Yes   WB Status WBAT   Ambulation 1   Device Rolling Walker   Assistance Minimal assistance   Quality of Gait unsteady cues for proper RW use   Gait Deviations Decreased step length;Decreased step height   Distance 6'   Comments Patient in recliner with alarm attached and call light   Activity Tolerance   Activity Tolerance Patient limited by endurance; Patient limited by cognitive status   Safety Devices   Type of devices Left in chair;Call light within reach; Chair alarm in place   Physical Therapy    Electronically signed by Tamika Peñaloza PTA on 8/11/2020 at 3:23 PM

## 2020-08-11 NOTE — PROGRESS NOTES
Progress Note    Date:8/11/2020       Room:0513/513-01  Patient Pritesh Oshea     YOB: 1928     Age:89 y.o. Subjective   Interval History Status: improved. Patient seen and examined this a.m. sitting up on the side of the bed appears to be in less discomfort. Awaiting acceptance to skilled nursing facility. Patiently eagerly awaiting discharge. Patient currently denying any headache, change in vision, chest pain, shortness of breath, abdominal pain, nausea vomiting, fevers or chills. Cumulative hospital course: Patient admitted 8/7 status post mechanical fall suffered at daughter's home when attempting to rise from the toilet. Patient was able to get herself up off the ground usually ambulates with assistance of a wheeled walker. Patient received 4 mg dosing of morphine in the emergency room with no resolution of pain subsequently admitted to the surgical unit. Continue PT/OT, plans for SNF placement referral sent to [facility]. COVID screening -negative 8/10/2020    Review of Systems   Review of Systems   Constitutional: Negative for activity change, fatigue and fever. HENT: Negative for sore throat and trouble swallowing. Respiratory: Negative for cough, shortness of breath and wheezing. Cardiovascular: Negative for chest pain and leg swelling. Gastrointestinal: Negative for nausea and vomiting. Genitourinary: Negative for difficulty urinating. Musculoskeletal: Positive for arthralgias. Skin: Negative for color change and rash. Neurological: Positive for weakness. Negative for light-headedness and headaches. Psychiatric/Behavioral: The patient is nervous/anxious.          Baseline dementia       Medications   Scheduled Meds:    sodium chloride flush  10 mL Intravenous 2 times per day    enoxaparin  40 mg Subcutaneous Daily    diclofenac sodium  2 g Topical BID     Continuous Infusions:   PRN Meds: LORazepam, sodium chloride flush, acetaminophen **OR** acetaminophen, polyethylene glycol, promethazine **OR** ondansetron, potassium chloride **OR** potassium alternative oral replacement **OR** potassium chloride, magnesium sulfate, HYDROcodone 5 mg - acetaminophen **OR** HYDROcodone 5 mg - acetaminophen, morphine **OR** morphine, naloxone    Past History    Past Medical History:   has a past medical history of Anxiety, Depression, Hx of blood clots, and Hypertension. Social History:   reports that she has never smoked. She has never used smokeless tobacco. She reports that she does not drink alcohol or use drugs. Family History:   Family History   Family history unknown: Yes       Physical Examination      Vitals:  BP (!) 150/83   Pulse 86   Temp 97.4 °F (36.3 °C) (Temporal)   Resp 14   Ht 5' 2\" (1.575 m)   Wt 125 lb (56.7 kg)   SpO2 93%   BMI 22.86 kg/m²   Temp (24hrs), Av.4 °F (36.3 °C), Min:97.1 °F (36.2 °C), Max:97.6 °F (36.4 °C)      I/O (24Hr): Intake/Output Summary (Last 24 hours) at 2020 1252  Last data filed at 2020 0958  Gross per 24 hour   Intake 360 ml   Output --   Net 360 ml       Physical Exam  Vitals signs and nursing note reviewed. Constitutional:       General: She is not in acute distress. Appearance: She is not toxic-appearing. HENT:      Head: Normocephalic and atraumatic. Nose: Nose normal.   Eyes:      General:         Right eye: No discharge. Left eye: No discharge. Conjunctiva/sclera: Conjunctivae normal.   Neck:      Musculoskeletal: Tenderness  Cardiovascular:      Rate and Rhythm: Normal rate and regular rhythm. Pulses: Normal pulses. Pulmonary:      Effort: Pulmonary effort is normal.      Breath sounds: No wheezing or rales. Abdominal:      General: Bowel sounds are normal.      Tenderness: There is no abdominal tenderness. There is no guarding or rebound.    Musculoskeletal:         General: Improved tenderness, improve range of motion   Skin:     Capillary Refill: Capillary refill takes less than 2 seconds. Neurological:      General: No focal deficit present. Mental Status: She is alert and oriented to person, place, and time. Cranial Nerves: No cranial nerve deficit. Psychiatric:         Mood and Affect: Normal mood, baseline dementia, underlying anxiety appreciated    Labs/Imaging/Diagnostics   Labs:  CBC:  Recent Labs     08/10/20  0621   WBC 7.4   RBC 3.95*   HGB 11.9*   HCT 35.9*   MCV 90.9   RDW 12.8        CHEMISTRIES:  Recent Labs     08/10/20  0621      K 4.0      CO2 26   BUN 15   CREATININE 0.6   GLUCOSE 100     PT/INR:  No results for input(s): PROTIME, INR in the last 72 hours. APTT:  No results for input(s): APTT in the last 72 hours. LIVER PROFILE:  Recent Labs     08/10/20  0621   AST 11   ALT 8   BILITOT 0.5   ALKPHOS 84       Imaging Last 24 Hours:  Xr Shoulder Right (min 2 Views)    Result Date: 8/7/2020  EXAMINATION:  XR SHOULDER RIGHT (MIN 2 VIEWS)  8/7/2020 1:30 PM HISTORY: Pain and decreased range of motion post fall COMPARISON: No comparison study. TECHNIQUE: 3 views: Frontal imaging in internal/external rotation. Scapular Y view. FINDINGS: There are advanced osteophytic changes in the glenohumeral joint. Degenerative changes also noted in the [facility] joint. There is diffuse osteoporosis. There is no fracture or dislocation. 1. No acute osseous abnormality. 2. Osteoporosis. 3. Osteoarthritic changes Signed by Dr Paul Hillman on 8/7/2020 1:31 PM    Xr Knee Right (3 Views)    Result Date: 8/7/2020  EXAMINATION:  XR KNEE RIGHT (3 VIEWS)  8/7/2020 1:31 PM HISTORY: Right knee pain post fall COMPARISON: No comparison study. TECHNIQUE: 2 views: AP and lateral projection imaging FINDINGS: The patella femoral and tibial relationships are maintained without fracture or dislocation.  Osteoarthritic changes involving the medial joint space compartment identified with near complete loss of joint space, subchondral sclerosis and aligned. The vertebral body heights are maintained. There is disc degeneration with near complete loss of disc space C6-C7 with disc degeneration also noted at C5-C6. Prevertebral soft tissues are normal. There is no CT evidence of acute fracture or subluxation. No significant canal stenosis. There is no CT evidence of posttraumatic disc herniation. There is multilevel foraminal narrowing. The upper lung fields are grossly clear. There are carotid artery calcifications. 1. No CT evidence of acute bony injury to the cervical spine. Signed by Dr Pat Curry on 8/7/2020 1:38 PM    Ct Pelvis Wo Contrast Additional Contrast? None    Result Date: 8/7/2020  EXAMINATION:  CT PELVIS WO CONTRAST  8/7/2020 3:02 PM HISTORY: Pain post fall COMPARISON: Right hip radiographs and abdomen pelvis CT dated 7/7/2020 TECHNIQUE: Radiation dose equals DLP 1554 mGy cm. AUTOMATED EXPOSURE CONTROL dose reduction technique was implemented. Thin section axial images were obtained without intravenous contrast. Multi projection reconstruction images were generated. FINDINGS: The right femoral head is imaged appropriately within the acetabulum. The proximal femur is intact without fracture. Changes from left femoral neck pinning identified. No left femur fracture observed. Bony pelvis is intact without fracture. Degenerative changes noted in the lower lumbar spine. There is osteoporosis. No definite acute soft tissue abnormalities associated with the right hip identified. There is extensive diverticulosis of the sigmoid colon. There are atherosclerotic aortoiliac calcifications. 1. No CT evidence of acute bony injury to the right hip/pelvis. Signed by Dr Pat Curry on 8/7/2020 3:06 PM    Xr Chest Portable    Result Date: 8/7/2020  XR CHEST PORTABLE 8/7/2020 11:23 AM HISTORY:   Hypertension. Trauma , fall Single view. COMPARISONS:  None FINDINGS: The level inspiration is shallow and lung volumes diminished.  There are no parenchymal infiltrates, pleural effusions or pneumothoraces. Mild linear atelectasis suspected in the lung bases associated with the level of inspiration. The heart is within the upper limits of normal in size with ectasia the aorta calcifications aortic arch without evidence of heart failure. There is osteoporosis. Degenerative changes noted in the thoracic spine and glenohumeral joints. No definite acute osseous abnormality observed    No acute cardiopulmonary process. Signed by Dr Leonidas العيل on 8/7/2020 1:34 PM    Xr Hip 2-3 Vw W Pelvis Right    Result Date: 8/7/2020  EXAM: XR HIP 2-3 VW W PELVIS RIGHT -- 8/7/2020 11:23 AM HISTORY: 89 years, Female, pain, spasm-like effect, right-sided pain, status post fall. COMPARISON: No existing relevant imaging studies available TECHNIQUE:  3 images. AP pelvis, AP and lateral views the right hip FINDINGS:  Diffuse bone demineralization. Right femur with a subtle cortical defect at the lesser trochanter, which could represent degenerative change or nearly nondisplaced fracture. Right hip normally aligned. Sacroiliac joints grossly symmetric. Sacral arcuate lines limited in assessment due to bony demineralization and overlying bowel gas. Pubic symphysis anatomic. Left proximal femur with dynamic fixation pin and intramedullary rosina. Distal tip of the intramedullary rosina outside the field of view. At least mild degenerative changes of the bilateral hips. Degenerative changes in the lumbar spine. Structure external to the patient between the legs, may represent a fecal or urinary management device. 1. Right femur with a subtle cortical defect at the lesser trochanter, which could represent a nearly nondisplaced fracture or degenerative change. Recommend CT for further evaluation. 2. Diffuse bone demineralization. 3. Postoperative changes of the left proximal femur. Results discussed by telephone with Frances Pan at 1:39 PM on 8/7/2020.  Signed by Dr Valeriy Tomas on 8/7/2020 1:40 PM        Assessment        Hospital Problems           Last Modified POA    Intractable pain 8/7/2020 Yes          Plan:        Intractable pain/status post mechanical fall/x-ray hip right abnormality/diffuse bone demineralization  - PT/OT  - Pain control modalities  - Vitamin D within normal limits  - Patient was also noted to be a two-person assist. -After PT evaluation 8/9 daughter on board with skilled nursing facility placement, [place] [facility] -COVID-19 screening -negative 8/10/2020       Elevated alkaline phosphatase  -Resolved  -Provide intravenous fluids     Reactive leukocytosis  -Resolved  -Likely related to stress of fall/pain  -Chest x-ray-no acute cardiopulmonary process     Chronic Benign Essential Hypertension   - Stable   - Continue current medications   - PRN medications ordered   - Will continue to monitor      Alzheimer's dementia  -Chronic condition, continue with Aricept     Depression/anxiety  -Chronic condition, continue SSRI  -Addition of Ativan 0.5 mg twice daily as needed        Electronically signed by   Es Ortiz   Internal Medicine Hospitalist  On 8/11/2020  At 12:52 PM    EMR Dragon/Transcription disclaimer:   Much of this encounter note is an electronic transcription/translation of spoken language to printed text.  The electronic translation of spoken language may permit erroneous, or at times, nonsensical words or phrases to be inadvertently transcribed; although attempts have made to review the note for such errors, some may still exist.

## 2020-08-12 LAB
ALBUMIN SERPL-MCNC: 3.4 G/DL (ref 3.5–5.2)
ALP BLD-CCNC: 95 U/L (ref 35–104)
ALT SERPL-CCNC: 11 U/L (ref 5–33)
ANION GAP SERPL CALCULATED.3IONS-SCNC: 15 MMOL/L (ref 7–19)
AST SERPL-CCNC: 17 U/L (ref 5–32)
BILIRUB SERPL-MCNC: 0.9 MG/DL (ref 0.2–1.2)
BUN BLDV-MCNC: 15 MG/DL (ref 8–23)
CALCIUM SERPL-MCNC: 9 MG/DL (ref 8.2–9.6)
CHLORIDE BLD-SCNC: 103 MMOL/L (ref 98–111)
CO2: 24 MMOL/L (ref 22–29)
CREAT SERPL-MCNC: 0.5 MG/DL (ref 0.5–0.9)
GFR AFRICAN AMERICAN: >59
GFR NON-AFRICAN AMERICAN: >60
GLUCOSE BLD-MCNC: 101 MG/DL (ref 74–109)
HCT VFR BLD CALC: 39.2 % (ref 37–47)
HEMOGLOBIN: 12.5 G/DL (ref 12–16)
MCH RBC QN AUTO: 30 PG (ref 27–31)
MCHC RBC AUTO-ENTMCNC: 31.9 G/DL (ref 33–37)
MCV RBC AUTO: 94.2 FL (ref 81–99)
PDW BLD-RTO: 12.7 % (ref 11.5–14.5)
PLATELET # BLD: 242 K/UL (ref 130–400)
PMV BLD AUTO: 9.4 FL (ref 9.4–12.3)
POTASSIUM SERPL-SCNC: 3.9 MMOL/L (ref 3.5–5)
RBC # BLD: 4.16 M/UL (ref 4.2–5.4)
SODIUM BLD-SCNC: 142 MMOL/L (ref 136–145)
TOTAL PROTEIN: 6.3 G/DL (ref 6.6–8.7)
WBC # BLD: 8.3 K/UL (ref 4.8–10.8)

## 2020-08-12 PROCEDURE — 6370000000 HC RX 637 (ALT 250 FOR IP): Performed by: INTERNAL MEDICINE

## 2020-08-12 PROCEDURE — 96372 THER/PROPH/DIAG INJ SC/IM: CPT

## 2020-08-12 PROCEDURE — 6360000002 HC RX W HCPCS: Performed by: FAMILY MEDICINE

## 2020-08-12 PROCEDURE — 36415 COLL VENOUS BLD VENIPUNCTURE: CPT

## 2020-08-12 PROCEDURE — 85027 COMPLETE CBC AUTOMATED: CPT

## 2020-08-12 PROCEDURE — 1210000000 HC MED SURG R&B

## 2020-08-12 PROCEDURE — 6370000000 HC RX 637 (ALT 250 FOR IP): Performed by: PHYSICIAN ASSISTANT

## 2020-08-12 PROCEDURE — 80053 COMPREHEN METABOLIC PANEL: CPT

## 2020-08-12 PROCEDURE — 6370000000 HC RX 637 (ALT 250 FOR IP): Performed by: FAMILY MEDICINE

## 2020-08-12 RX ORDER — LISINOPRIL 20 MG/1
20 TABLET ORAL DAILY
Status: DISCONTINUED | OUTPATIENT
Start: 2020-08-12 | End: 2020-08-13 | Stop reason: HOSPADM

## 2020-08-12 RX ADMIN — ESCITALOPRAM OXALATE 10 MG: 10 TABLET ORAL at 08:26

## 2020-08-12 RX ADMIN — DICLOFENAC SODIUM 2 G: 10 GEL TOPICAL at 08:28

## 2020-08-12 RX ADMIN — LISINOPRIL 20 MG: 20 TABLET ORAL at 08:27

## 2020-08-12 RX ADMIN — DONEPEZIL HYDROCHLORIDE 10 MG: 5 TABLET, FILM COATED ORAL at 20:25

## 2020-08-12 RX ADMIN — LORAZEPAM 0.5 MG: 0.5 TABLET ORAL at 20:25

## 2020-08-12 RX ADMIN — ENOXAPARIN SODIUM 40 MG: 40 INJECTION, SOLUTION INTRAVENOUS; SUBCUTANEOUS at 18:24

## 2020-08-12 RX ADMIN — DICLOFENAC SODIUM 2 G: 10 GEL TOPICAL at 20:25

## 2020-08-12 NOTE — PROGRESS NOTES
Physical Therapy  Memory Jeannette  [id number]     08/12/20 1420   Subjective   Subjective Attempt, patient declines OOB at this time. Patient states she has been up to the chair and wants to rest at this time. Will cont to follow.    Electronically signed by Be Cole PTA on 8/12/2020 at 2:28 PM

## 2020-08-12 NOTE — PROGRESS NOTES
Hospitalist Progress Note  8/12/2020 7:41 AM  Subjective:   Admit Date: 8/7/2020  PCP: Berdine Alpers, APRN    Chief Complaint: fall     Subjective: BP elevated overnight. Asking to see her daughter. Says external cath is uncomfortable but denies any other MSK pain. Denies CV, resp, GI symptoms. Cumulative Hospital History:     Patient admitted 8/7 status post mechanical fall suffered at daughter's home when attempting to rise from the toilet. Patient was able to get herself up off the ground usually ambulates with assistance of a wheeled walker. Patient received 4 mg dosing of morphine in the emergency room with no resolution of pain subsequently admitted to the surgical unit. Continue PT/OT, plans for SNF placement referral sent to [facility]. COVID screening -negative 8/10/2020     ROS: Six point review of systems is negative except as specifically addressed above. DIET CARDIAC;   Dietary Nutrition Supplements: Standard High Calorie Oral Supplement    Intake/Output Summary (Last 24 hours) at 8/12/2020 0741  Last data filed at 8/12/2020 9424  Gross per 24 hour   Intake 660 ml   Output --   Net 660 ml     Medications:  Current Facility-Administered Medications   Medication Dose Route Frequency Provider Last Rate Last Dose    lisinopril (PRINIVIL;ZESTRIL) tablet 20 mg  20 mg Oral Daily Dawood Reardon MD        donepezil (ARICEPT) tablet 10 mg  10 mg Oral Nightly Mitch Dior PA-C   10 mg at 08/11/20 2008    escitalopram (LEXAPRO) tablet 10 mg  10 mg Oral Daily Mitch Dior PA-C   10 mg at 08/11/20 2008    hydrALAZINE (APRESOLINE) injection 5 mg  5 mg Intravenous Q6H PRN Mitch Dior PA-C   5 mg at 08/11/20 2238    LORazepam (ATIVAN) tablet 0.5 mg  0.5 mg Oral BID PRN Krishna Aceves MD   0.5 mg at 08/09/20 2133    sodium chloride flush 0.9 % injection 10 mL  10 mL Intravenous 2 times per day Krishna Aceves MD   10 mL at 08/11/20 2009    sodium chloride flush 0.9 % injection 10 mL  10 mL Intravenous PRN Bridger Strickland MD        acetaminophen (TYLENOL) tablet 650 mg  650 mg Oral Q6H PRN Bridger Strickland MD        Or    acetaminophen (TYLENOL) suppository 650 mg  650 mg Rectal Q6H PRN Bridger Strickland MD        polyethylene glycol [facility]) packet 17 g  17 g Oral Daily PRN Bridger Strickland MD        promethazine (PHENERGAN) tablet 12.5 mg  12.5 mg Oral Q6H PRN Bridger Strickland MD   12.5 mg at 08/08/20 2224    Or    ondansetron (ZOFRAN) injection 4 mg  4 mg Intravenous Q6H PRN Bridger Strickland MD   4 mg at 08/09/20 1451    enoxaparin (LOVENOX) injection 40 mg  40 mg Subcutaneous Daily Bridger Strickland MD   40 mg at 08/11/20 1826    potassium chloride (KLOR-CON M) extended release tablet 40 mEq  40 mEq Oral PRN Bridger Strickland MD        Or   [place] potassium bicarb-citric acid (EFFER-K) effervescent tablet 40 mEq  40 mEq Oral PRALVA Strickland MD        Or   [place] potassium chloride 10 mEq/100 mL IVPB (Peripheral Line)  10 mEq Intravenous PRN Bridger Strickland MD        magnesium sulfate 2 g in 50 mL IVPB premix  2 g Intravenous PRN Bridger Strickland MD        diclofenac sodium (VOLTAREN) 1 % gel 2 g  2 g Topical BID Bridger Strickland MD   2 g at 08/11/20 2007    HYDROcodone-acetaminophen (NORCO) 5-325 MG per tablet 1 tablet  1 tablet Oral Q4H PRALVA Strickland MD   1 tablet at 08/08/20 2224    Or    HYDROcodone-acetaminophen (NORCO) 5-325 MG per tablet 2 tablet  2 tablet Oral Q4H PRALVA Strickland MD   2 tablet at 08/09/20 1754    morphine injection 1 mg  1 mg Intravenous Q3H PRALVA Strickland MD        Or    morphine injection 2 mg  2 mg Intravenous Q6H PRALVA Strickland MD   2 mg at 08/08/20 2345    naloxone [facility]) injection 0.4 mg  0.4 mg Intravenous PRN Bridger Strickland MD            Labs:     Recent Labs     08/10/20  0621 08/12/20  0434   WBC 7.4 8.3   RBC 3.95* 4.16*   HGB 11.9* 12.5   HCT 35.9* 39.2   MCV 90.9 94.2   MCH 30.1 30.0   MCHC 33.1 31.9*  242     Recent Labs     08/10/20  0621 08/12/20  0434    142   K 4.0 3.9   ANIONGAP 9 15    103   CO2 26 24   BUN 15 15   CREATININE 0.6 0.5   GLUCOSE 100 101   CALCIUM 8.7 9.0     No results for input(s): MG, PHOS in the last 72 hours. Recent Labs     08/10/20  0621 08/12/20  0434   AST 11 17   ALT 8 11   BILITOT 0.5 0.9   ALKPHOS 84 95     ABGs:No results for input(s): PH, PO2, PCO2, HCO3, BE, O2SAT in the last 72 hours. Troponin T: No results for input(s): TROPONINI in the last 72 hours. INR: No results for input(s): INR in the last 72 hours. Lactic Acid: No results for input(s): LACTA in the last 72 hours. Objective:   Vitals: BP (!) 190/77   Pulse 71   Temp 96.8 °F (36 °C) (Temporal)   Resp 20   Ht 5' 2\" (1.575 m)   Wt 125 lb (56.7 kg)   SpO2 91%   BMI 22.86 kg/m²   24HR INTAKE/OUTPUT:      Intake/Output Summary (Last 24 hours) at 8/12/2020 0741  Last data filed at 8/12/2020 5826  Gross per 24 hour   Intake 660 ml   Output --   Net 660 ml     General appearance: alert and cooperative with exam  Head: NC/AT   Eyes: normal sclera  Mouth: MMM   Lungs: no increased work of breathing, CTAB  Heart: RRR  Abdomen: soft, non-tender; bowel sounds normal; no masses,  no organomegaly  Extremities: extremities normal, atraumatic, no cyanosis or edema and no edema, redness or tenderness in the calves or thighs  Neurologic: no focal neurologic deficits, normal sensation, alert and oriented, affect and mood appropriate  Skin: no rashes, nodules    Assessment and Plan: Active Problems:    Intractable pain  Resolved Problems:    * No resolved hospital problems.  *    Intractable pain/status post mechanical fall/x-ray hip right abnormality/diffuse bone demineralization  - PT/OT  - Pain control modalities  - Vitamin D within normal limits  - Patient was also noted to be a two-person assist. -After PT evaluation 8/9 daughter on board with skilled nursing facility placement, referral sent to [facility]. -COVID-19 screening -negative 8/10/2020     Chronic Benign Essential Hypertension   - Stable   - Continue current medications   - PRN medications ordered   - Will continue to monitor      Alzheimer's dementia  -Chronic condition, continue with Aricept     Depression/anxiety  -Chronic condition, continue SSRI  -Addition of Ativan 0.5 mg twice daily as needed    RESOLVED:  Elevated alkaline phosphatase  Reactive leukocytosis    Advance Directive: Full Code    DVT prophylaxis: lovenox  Discharge planning: medically ready. Awaiting precert to [facility]. Signed:   Khadar Foster MD 8/12/2020 7:41 AM  Hospitalist

## 2020-08-13 VITALS
HEART RATE: 78 BPM | BODY MASS INDEX: 23 KG/M2 | WEIGHT: 125 LBS | SYSTOLIC BLOOD PRESSURE: 132 MMHG | TEMPERATURE: 97.7 F | HEIGHT: 62 IN | RESPIRATION RATE: 20 BRPM | OXYGEN SATURATION: 93 % | DIASTOLIC BLOOD PRESSURE: 67 MMHG

## 2020-08-13 PROBLEM — Z51.5 PALLIATIVE CARE PATIENT: Status: ACTIVE | Noted: 2020-08-13

## 2020-08-13 PROBLEM — Z51.5 PALLIATIVE CARE PATIENT: Status: RESOLVED | Noted: 2020-08-13 | Resolved: 2020-08-13

## 2020-08-13 PROBLEM — R52 INTRACTABLE PAIN: Status: RESOLVED | Noted: 2020-08-07 | Resolved: 2020-08-13

## 2020-08-13 PROBLEM — D72.829 LEUKOCYTOSIS: Status: ACTIVE | Noted: 2020-08-13
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
## 2020-08-13 PROBLEM — D72.829 LEUKOCYTOSIS: Status: RESOLVED | Noted: 2020-08-13 | Resolved: 2020-08-13

## 2020-08-13 LAB — SARS-COV-2, PCR: NOT DETECTED

## 2020-08-13 PROCEDURE — U0003 INFECTIOUS AGENT DETECTION BY NUCLEIC ACID (DNA OR RNA); SEVERE ACUTE RESPIRATORY SYNDROME CORONAVIRUS 2 (SARS-COV-2) (CORONAVIRUS DISEASE [COVID-19]), AMPLIFIED PROBE TECHNIQUE, MAKING USE OF HIGH THROUGHPUT TECHNOLOGIES AS DESCRIBED BY CMS-2020-01-R: HCPCS

## 2020-08-13 PROCEDURE — 6360000002 HC RX W HCPCS: Performed by: FAMILY MEDICINE

## 2020-08-13 PROCEDURE — 6370000000 HC RX 637 (ALT 250 FOR IP): Performed by: INTERNAL MEDICINE

## 2020-08-13 PROCEDURE — 6370000000 HC RX 637 (ALT 250 FOR IP): Performed by: PHYSICIAN ASSISTANT

## 2020-08-13 PROCEDURE — 6370000000 HC RX 637 (ALT 250 FOR IP): Performed by: FAMILY MEDICINE

## 2020-08-13 PROCEDURE — 96372 THER/PROPH/DIAG INJ SC/IM: CPT

## 2020-08-13 RX ORDER — LORAZEPAM 0.5 MG/1
0.5 TABLET ORAL NIGHTLY PRN
Qty: 3 TABLET | Refills: 0 | Status: SHIPPED | DISCHARGE
Start: 2020-08-13 | End: 2020-08-16

## 2020-08-13 RX ORDER — AMLODIPINE BESYLATE 5 MG/1
5 TABLET ORAL DAILY
Qty: 30 TABLET | Refills: 3 | DISCHARGE
Start: 2020-08-14 | End: 2020-08-31 | Stop reason: SDUPTHER

## 2020-08-13 RX ORDER — LISINOPRIL 20 MG/1
20 TABLET ORAL DAILY
Qty: 30 TABLET | Refills: 3 | DISCHARGE
Start: 2020-08-14 | End: 2020-08-31 | Stop reason: SDUPTHER

## 2020-08-13 RX ORDER — AMLODIPINE BESYLATE 5 MG/1
5 TABLET ORAL DAILY
Status: DISCONTINUED | OUTPATIENT
Start: 2020-08-13 | End: 2020-08-13 | Stop reason: HOSPADM

## 2020-08-13 RX ADMIN — DICLOFENAC SODIUM 2 G: 10 GEL TOPICAL at 09:30

## 2020-08-13 RX ADMIN — LISINOPRIL 20 MG: 20 TABLET ORAL at 09:27

## 2020-08-13 RX ADMIN — ACETAMINOPHEN 650 MG: 325 TABLET, FILM COATED ORAL at 12:32

## 2020-08-13 RX ADMIN — AMLODIPINE BESYLATE 5 MG: 5 TABLET ORAL at 09:27

## 2020-08-13 RX ADMIN — ESCITALOPRAM OXALATE 10 MG: 10 TABLET ORAL at 09:27

## 2020-08-13 RX ADMIN — ENOXAPARIN SODIUM 40 MG: 40 INJECTION, SOLUTION INTRAVENOUS; SUBCUTANEOUS at 17:06

## 2020-08-13 NOTE — ACP (ADVANCE CARE PLANNING)
Advance Care Planning     Advance Care Planning Activator (Inpatient)  Conversation Note      Date of ACP Conversation: 8/13/20    Conversation Conducted with: Felipe Casey ACP Activator: [address] Decision Maker:     Current Designated Health Care Decision Maker:   Primary Decision Maker: Elbert Rodger - Child - [phone]      Care Preferences    Ventilation: \"If you were in your present state of health and suddenly became very ill and were unable to breathe on your own, what would your preference be about the use of a ventilator (breathing machine) if it were available to you? \"      Would the patient desire the use of ventilator (breathing machine)?: No      Resuscitation  \"In the event your heart stopped as a result of an underlying serious health condition, would you want attempts to be made to restart your heart (answer \"yes\" for attempt to resuscitate) or would you prefer a natural death (answer \"no\" for do not attempt to resuscitate)? \" No      Conversation Outcomes:  [x] ACP discussion completed  [] Existing advance directive reviewed with patient; no changes to patient's previously recorded wishes  [] New Advance Directive completed  [] Portable Do Not Rescitate prepared for Provider review and signature  [] POLST/POST/MOLST/MOST prepared for Provider review and signature    Electronically signed by Sara Crespo RN on 8/13/2020 at 12:07 PM

## 2020-08-13 NOTE — CARE COORDINATION
Pre-cert is still pending for placement with [facility]. Admissions will notify  once pre-cert has cleared.   [facility]  [phone] f  Electronically signed by Erwin Simms on 8/13/2020 at 8:18 AM

## 2020-08-13 NOTE — CARE COORDINATION
I called patient's daughter, Cameron Cabezas and notified her of the insurance approval to [place]. I also notified the Nurse Neena Lopez. Covid Test will need to be completed prior to DC.   Electronically signed by Laurel Harrison RN, BSN on 8/13/2020 at 9:09 AM

## 2020-08-13 NOTE — CONSULTS
Palliative Care:   Pt initiated to palliative care for advance care planning and goals of care. Pt came to ED with intractable pain. Pt also has dementia, oriented to self per family and RN. Met with pt dtr to obtain information on pt. Past Medical History:        Past Medical History:   Diagnosis Date    Anxiety     Depression     Hx of blood clots     Hypertension        Advance Directives:   Pt is full code. Spoke with dtr/POA, Josephine Chairez, she tells me pt is a DNR per pt AD. See ACP note      Pain/Other Symptoms:    Pt appears comfortable, sleeping with loud snoring. Activity:  As amelia           Psychological/Spiritual:   Limited family support, good spiritual support. Plan:  DC to [facility] today for rehab. Patient/family discussion r/t goals:  Dtr talks with me about pt hx as well as her move from [place], [place] to live with this dtr. Pt spouse passed away in June of this year. Pt needs assist with all ADL's. Dtr tells me pt has been able to amb with a walker short distances. Dtr is concerned d/t pt dementia that she will not do well in [place] but states \"I cannot bring her home unless she can help herself with amb, RR\". Goal is for pt to return to her dtr's home after rehab. Dtr is concerned she will not be able to see pt for 2 weeks and concerned how pt will do. Dtr is tearful and states she is unsure what she will do if pt is unable to get back to baseline. PC nurse also spoke with dtr about other goals of care such as [facility] and hospice care. Left family contact information about hospice should she have further questions in the future.                Electronically signed by Sara Crespo RN on 8/13/2020 at 11:51 AM

## 2020-08-13 NOTE — PROGRESS NOTES
Patient discharged to [facility]. Patient transported via [employer].

## 2020-08-13 NOTE — PLAN OF CARE
Problem: Skin Integrity:  Goal: Will show no infection signs and symptoms  Description: Will show no infection signs and symptoms  Outcome: Ongoing  Goal: Absence of new skin breakdown  Description: Absence of new skin breakdown  Outcome: Ongoing     Problem: Falls - Risk of:  Goal: Will remain free from falls  Description: Will remain free from falls  Outcome: Ongoing  Goal: Absence of physical injury  Description: Absence of physical injury  Outcome: Ongoing     Problem: Pain:  Goal: Pain level will decrease  Description: Pain level will decrease  Outcome: Ongoing  Goal: Control of acute pain  Description: Control of acute pain  Outcome: Ongoing  Goal: Control of chronic pain  Description: Control of chronic pain  Outcome: Ongoing     Problem: Discharge Planning:  Goal: Participates in care planning  Description: Participates in care planning  Outcome: Ongoing  Goal: Discharged to appropriate level of care  Description: Discharged to appropriate level of care  Outcome: Ongoing     Problem: Activity Intolerance:  Goal: Ability to tolerate increased activity will improve  Description: Ability to tolerate increased activity will improve  Outcome: Ongoing     Problem: Anxiety/Stress:  Goal: Level of anxiety will decrease  Description: Level of anxiety will decrease  Outcome: Ongoing     Problem:  Bowel Function - Altered:  Goal: Bowel elimination is within specified parameters  Description: Bowel elimination is within specified parameters  Outcome: Ongoing     Problem: Fluid Volume - Deficit:  Goal: Absence of fluid volume deficit signs and symptoms  Description: Absence of fluid volume deficit signs and symptoms  Outcome: Ongoing  Goal: Electrolytes within specified parameters  Description: Electrolytes within specified parameters  Outcome: Ongoing     Problem: Mental Status - Impaired:  Goal: Absence of physical injury  Description: Absence of physical injury  Outcome: Ongoing  Goal: Absence of continued

## 2020-08-13 NOTE — DISCHARGE SUMMARY
Discharge Summary    NAME: Idalia Sheikh  :  1928  MRN:  [id number]    Admit date:  2020  Discharge date:      Admitting Physician: Alexis Mendenhall MD    Advance Directive: Full Code    Primary Care Physician:  GEOFF Elliott    Discharge Diagnoses: Active Problems:    * No active hospital problems. *  Resolved Problems:    Intractable pain    Palliative care patient    Accident due to mechanical fall without injury    Leukocytosis      HPI: \"Patient is a 70-year-old  female with a known past medical history of depression/anxiety, dementia, hypertension presented to [address] [place] [address] emergency room status post mechanical fall suffered at daughter's home earlier today when attempting to arise from the toilet. Patient was able to rise from the ground, usually ambulates with assistance of a wheeled walker. On initial ER presentation pain rated 9/10 out of 10, states patient has hit the Medr floor pain now down to a 4. Patient is status post 4 mg morphine injection provided in the emergency room. Work-up in the emergency room revealed unremarkable urinalysis, head CT with no acute intracranial process, CT cervical spine without abnormality, x-ray pelvis right sided with questionable fracture of the right femur however follow-up CT pelvis without contrast does not reveal any fracture. Patient admitted for continued pain control, PT/OT evaluation, home health services. Patient currently resting in bed, eating meal denying any headache change in vision, chest pain, abdominal pain, nausea vomiting, fevers or chills. No recent medication adjustments per daughter. \"    Hospital Course:  Patient admitted  status post mechanical fall suffered at daughter's home when attempting to rise from the toilet. Patient was able to get herself up off the ground usually ambulates with assistance of a wheeled walker.  Imaging to head, cervical spine, right knee, right shoulder did not show any acute degeneration also noted at C5-C6. Prevertebral soft tissues are normal. There is no CT evidence of acute fracture or subluxation. No significant canal stenosis. There is no CT evidence of posttraumatic disc herniation. There is multilevel foraminal narrowing. The upper lung fields are grossly clear. There are carotid artery calcifications. 1. No CT evidence of acute bony injury to the cervical spine. Signed by Dr Paul Hillman on 8/7/2020 1:38 PM    Ct Pelvis Wo Contrast Additional Contrast? None    Result Date: 8/7/2020  EXAMINATION:  CT PELVIS WO CONTRAST  8/7/2020 3:02 PM HISTORY: Pain post fall COMPARISON: Right hip radiographs and abdomen pelvis CT dated 7/7/2020 TECHNIQUE: Radiation dose equals DLP 1554 mGy cm. AUTOMATED EXPOSURE CONTROL dose reduction technique was implemented. Thin section axial images were obtained without intravenous contrast. Multi projection reconstruction images were generated. FINDINGS: The right femoral head is imaged appropriately within the acetabulum. The proximal femur is intact without fracture. Changes from left femoral neck pinning identified. No left femur fracture observed. Bony pelvis is intact without fracture. Degenerative changes noted in the lower lumbar spine. There is osteoporosis. No definite acute soft tissue abnormalities associated with the right hip identified. There is extensive diverticulosis of the sigmoid colon. There are atherosclerotic aortoiliac calcifications. 1. No CT evidence of acute bony injury to the right hip/pelvis. Signed by Dr Paul Hillman on 8/7/2020 3:06 PM    Xr Chest Portable    Result Date: 8/7/2020  XR CHEST PORTABLE 8/7/2020 11:23 AM HISTORY:   Hypertension. Trauma , fall Single view. COMPARISONS:  None FINDINGS: The level inspiration is shallow and lung volumes diminished. There are no parenchymal infiltrates, pleural effusions or pneumothoraces.  Mild linear atelectasis suspected in the lung bases associated with the level of inspiration. The heart is within the upper limits of normal in size with ectasia the aorta calcifications aortic arch without evidence of heart failure. There is osteoporosis. Degenerative changes noted in the thoracic spine and glenohumeral joints. No definite acute osseous abnormality observed    No acute cardiopulmonary process. Signed by Dr Alex Ramesh on 8/7/2020 1:34 PM    Xr Hip 2-3 Vw W Pelvis Right    Result Date: 8/7/2020  EXAM: XR HIP 2-3 VW W PELVIS RIGHT -- 8/7/2020 11:23 AM HISTORY: 89 years, Female, pain, spasm-like effect, right-sided pain, status post fall. COMPARISON: No existing relevant imaging studies available TECHNIQUE:  3 images. AP pelvis, AP and lateral views the right hip FINDINGS:  Diffuse bone demineralization. Right femur with a subtle cortical defect at the lesser trochanter, which could represent degenerative change or nearly nondisplaced fracture. Right hip normally aligned. Sacroiliac joints grossly symmetric. Sacral arcuate lines limited in assessment due to bony demineralization and overlying bowel gas. Pubic symphysis anatomic. Left proximal femur with dynamic fixation pin and intramedullary rosina. Distal tip of the intramedullary rosina outside the field of view. At least mild degenerative changes of the bilateral hips. Degenerative changes in the lumbar spine. Structure external to the patient between the legs, may represent a fecal or urinary management device. 1. Right femur with a subtle cortical defect at the lesser trochanter, which could represent a nearly nondisplaced fracture or degenerative change. Recommend CT for further evaluation. 2. Diffuse bone demineralization. 3. Postoperative changes of the left proximal femur. Results discussed by telephone with Virginia Bella at 1:39 PM on 8/7/2020.  Signed by Dr Jailene Davila on 8/7/2020 1:40 PM      Pertinent Labs:   CBC:   Recent Labs     08/12/20  0434   WBC 8.3   HGB 12.5        BMP:    Recent Labs 08/12/20  0434      K 3.9      CO2 24   BUN 15   CREATININE 0.5   GLUCOSE 101     INR: No results for input(s): INR in the last 72 hours. Lipids: No results for input(s): CHOL, HDL in the last 72 hours. Invalid input(s): LDLCALCU  ABGs:No results for input(s): PHART, FDV0RGD, PO2ART, DRD1KZJ, BEART, HGBAE, N0UTJGCN, CARBOXHGBART, 02THERAPY in the last 72 hours. HgBA1c:  No results for input(s): LABA1C in the last 72 hours.     Physical Exam:  Vital Signs: BP (!) 159/74   Pulse 80   Temp 96.8 °F (36 °C) (Temporal)   Resp 20   Ht 5' 2\" (1.575 m)   Wt 125 lb (56.7 kg)   SpO2 94%   BMI 22.86 kg/m²   General appearance: alert and cooperative with exam  Head: NC/AT   Eyes: normal sclera  Mouth: MMM   Lungs: no increased work of breathing, CTAB  Heart: RRR  Abdomen: soft, non-tender; bowel sounds normal; no masses,  no organomegaly  Extremities: extremities normal, atraumatic, no cyanosis or edema and no edema, redness or tenderness in the calves or thighs  Neurologic: no focal neurologic deficits, normal sensation, alert and oriented to person only, affect and mood appropriate  Skin: no rashes, nodules    Discharge Medications:       Ranell Harm   Home Medication Instructions CAPRICE:[id number]    Printed on:08/13/20 1121   Medication Information                      amLODIPine (NORVASC) 5 MG tablet  Take 1 tablet by mouth daily             aspirin EC 81 MG EC tablet  Take 1 tablet by mouth daily             diclofenac sodium (VOLTAREN) 1 % GEL  Apply 2 g topically 2 times daily             donepezil (ARICEPT) 10 MG tablet  Take 1 tablet by mouth nightly             escitalopram (LEXAPRO) 10 MG tablet  Take 1 tablet by mouth daily             furosemide (LASIX) 40 MG tablet  Take 1 tablet by mouth 2 times daily             lisinopril (PRINIVIL;ZESTRIL) 20 MG tablet  Take 1 tablet by mouth daily             LORazepam (ATIVAN) 0.5 MG tablet  Take 1 tablet by mouth nightly as needed for Anxiety for up to 3 days. Diet: Dietary Nutrition Supplements: Standard High Calorie Oral Supplement  DIET NO SALT ADDED (3-4 GM); Dental Soft   Activity: Resume as tolerated     Disposition: Patient is medically stable and will be discharged to SNF. Time spent on discharge > 35 minutes. Signed:   Ambrocio Juarez MD

## 2020-08-13 NOTE — DISCHARGE INSTR - DIET
 Good nutrition is important when healing from an illness, injury, or surgery. Follow any nutrition recommendations given to you during your hospital stay.  If you were given an oral nutrition supplement while in the hospital, continue to take this supplement at home. You can take it with meals, in-between meals, and/or before bedtime. These supplements can be purchased at most local grocery stores, pharmacies, and chain super-stores.  If you have any questions about your diet or nutrition, call the hospital and ask for the dietitian. Low sodium (3-4 grams per day), dental soft diet.     Dietary Nutrition Supplements: Standard High Calorie Oral Supplement - FOUR TIMES DAILY AT BREAKFAST, LUNCH, DINNER, AND EVENING SNACK

## 2020-08-14 ENCOUNTER — TELEPHONE (OUTPATIENT)
Dept: INTERNAL MEDICINE | Age: 85
End: 2020-08-14

## 2020-08-14 NOTE — TELEPHONE ENCOUNTER
SKILLED NURSING FACILITY:   INITIAL CALL POST-HOSPITAL DISCHARGE    SNF: [facility]     PHONE NUMBER: [phone]    THERAPY: PT/OT    ANTICIPATED LENGTH OF STAY: unknown    I spoke with Onelia at [facility], she states Mrs. Juarez came in last night. She reports Mrs. Juarez is confused. She is very emotional and scared of the nursing staff. She is alert, but not oriented to place or time. Therapy has been in to evaluate her. The plans are for short term rehab at this time, however there are no plans for discharge at this time.

## 2020-08-18 RX ORDER — LORAZEPAM 0.5 MG/1
TABLET ORAL
Qty: 30 TABLET | Refills: 0 | Status: SHIPPED | OUTPATIENT
Start: 2020-08-18 | End: 2020-08-31 | Stop reason: SDUPTHER

## 2020-08-20 ENCOUNTER — TELEPHONE (OUTPATIENT)
Dept: INTERNAL MEDICINE | Age: 85
End: 2020-08-20

## 2020-08-27 ENCOUNTER — TELEPHONE (OUTPATIENT)
Dept: INTERNAL MEDICINE | Age: 85
End: 2020-08-27

## 2020-08-27 ENCOUNTER — TELEPHONE (OUTPATIENT)
Dept: INTERNAL MEDICINE CLINIC | Age: 85
End: 2020-08-27

## 2020-08-27 NOTE — TELEPHONE ENCOUNTER
[facility] Transitions Initial Follow Up Call    Outreach made within 2 business days of discharge: Yes    Patient: Adriano Hein Patient : 1928 MRN: [id number]    Reason for Admission: Admitted 20 for leg pain   Discharge Date: 20 from [facility], then admitted to [address] [place] [address] and rehab for therapy. Discharged 20   Discharge Diagnoses: Active Problems:    * No active hospital problems. *  Resolved Problems:    Intractable pain    Palliative care patient    Accident due to mechanical fall without injury    Leukocytosis     Spoke with: attempted to reach patient and patient's daughter, no answer. Message left with intent of my call. I will reach out again next business day. Discharge department/facility: [facility]    Follow Up  No future appointments.     Ritesh Ferrara MA

## 2020-08-28 ENCOUNTER — TELEPHONE (OUTPATIENT)
Dept: INTERNAL MEDICINE CLINIC | Age: 85
End: 2020-08-28

## 2020-08-28 ENCOUNTER — TELEPHONE (OUTPATIENT)
Dept: INTERNAL MEDICINE | Age: 85
End: 2020-08-28

## 2020-08-28 NOTE — TELEPHONE ENCOUNTER
[facility] Trans[facility] Initial Follow Up Call    Outreach made within 2 business days of discharge: Yes    Patient: Rober Carranza            Patient : 1928 MRN: [id number]         Reason for Admission: Admitted 20 for leg pain   Discharge Date: 20 from [facility], then admitted to [address] [place] [address] and rehab for therapy. Discharged 20   Discharge Diagnoses:  Active Problems:    * No active hospital problems. *  Resolved Problems:    Intractable pain    Palliative care patient    Accident due to mechanical fall without injury    Leukocytosis                Spoke with: Ashley Vazquez, patient's daughter     Discharge department/facility: [facility]    TCM Interactive Patient Contact:  Was patient able to fill all prescriptions: Yes  Was patient instructed to bring all medications to the follow-up visit: Yes  Is patient taking all medications as directed in the discharge summary? Yes  Does patient understand their discharge instructions: Yes  Does patient have questions or concerns that need addressed prior to 7-14 day follow up office visit: no    I spoke with Benitasunnialex VivarTucker, patient's daughter. She states Mrs. Juarez is living with her. She reports her dementia is getting worse. She is getting around better, but is more confused. She states she needs eyes on her at all times. She reports her mom took a 2 hours nap yesterday. She is resting much better now that she is home. She states she feels like the change of setting between the hospital visit and the time in the nursing home has set Mrs. Juarez back a lot cognitively. Home health is coming out today to get them set up with services. She states they will have someone sit with Mrs. Juarez a few hours every other week so Ashley Vazquez can run her errands. She does not have all of Mrs. Juarez's medications. She is out of the Lorazepam and Lexapro.  She wants to wait until the follow up on Monday to get a prescription for these as she is hoping to have everything switched over to [facility] so she doesn't have to go out to get her medications anymore. I told her we could take care of this. HFU appointment moved to Monday, 08/31/20. Magali López   Scheduled appointment with PCP within 7-14 days    Follow Up  Future Appointments   Date Time Provider Jovana Crews   8/31/2020  1:30 PM GEOFF Velasco [address]

## 2020-08-28 NOTE — TELEPHONE ENCOUNTER
[facility] nurse admitted pt and plans to see pt 2x/wk for 3wks then 1x/wk for 2wks  Plan to have wound care nurse see pt regarding BLEs, so frequency of POC could change based on her assessment of pt needs. She would also like to order  eval needs and pt wants to be DNR - is that okay  ?

## 2020-08-31 ENCOUNTER — OFFICE VISIT (OUTPATIENT)
Dept: INTERNAL MEDICINE | Age: 85
End: 2020-08-31
Payer: MEDICARE

## 2020-08-31 ENCOUNTER — TELEPHONE (OUTPATIENT)
Dept: INTERNAL MEDICINE CLINIC | Age: 85
End: 2020-08-31

## 2020-08-31 VITALS — SYSTOLIC BLOOD PRESSURE: 105 MMHG | HEART RATE: 68 BPM | DIASTOLIC BLOOD PRESSURE: 65 MMHG

## 2020-08-31 PROBLEM — F33.41 RECURRENT MAJOR DEPRESSIVE DISORDER, IN PARTIAL REMISSION (HCC): Status: ACTIVE | Noted: 2020-08-31

## 2020-08-31 PROCEDURE — 1111F DSCHRG MED/CURRENT MED MERGE: CPT | Performed by: NURSE PRACTITIONER

## 2020-08-31 PROCEDURE — 99495 TRANSJ CARE MGMT MOD F2F 14D: CPT | Performed by: NURSE PRACTITIONER

## 2020-08-31 RX ORDER — ESCITALOPRAM OXALATE 10 MG/1
10 TABLET ORAL DAILY
Qty: 90 TABLET | Refills: 1 | Status: SHIPPED | OUTPATIENT
Start: 2020-08-31 | End: 2021-02-22

## 2020-08-31 RX ORDER — LORAZEPAM 0.5 MG/1
TABLET ORAL
Qty: 30 TABLET | Refills: 0 | Status: SHIPPED | OUTPATIENT
Start: 2020-08-31 | End: 2020-09-28

## 2020-08-31 RX ORDER — FUROSEMIDE 40 MG/1
40 TABLET ORAL 2 TIMES DAILY
Qty: 180 TABLET | Refills: 1 | Status: ON HOLD | OUTPATIENT
Start: 2020-08-31 | End: 2021-03-04 | Stop reason: HOSPADM

## 2020-08-31 RX ORDER — LISINOPRIL 20 MG/1
20 TABLET ORAL DAILY
Qty: 90 TABLET | Refills: 1 | Status: SHIPPED | OUTPATIENT
Start: 2020-08-31 | End: 2021-02-22

## 2020-08-31 RX ORDER — DONEPEZIL HYDROCHLORIDE 10 MG/1
10 TABLET, FILM COATED ORAL NIGHTLY
Qty: 90 TABLET | Refills: 1 | Status: SHIPPED | OUTPATIENT
Start: 2020-08-31 | End: 2021-02-22

## 2020-08-31 RX ORDER — AMLODIPINE BESYLATE 5 MG/1
5 TABLET ORAL DAILY
Qty: 90 TABLET | Refills: 1 | Status: SHIPPED | OUTPATIENT
Start: 2020-08-31 | End: 2021-02-17

## 2020-08-31 NOTE — TELEPHONE ENCOUNTER
[facility] PT eval was done and per PT pt has completed;  unsteady gait/balance and limited endurance. RECOMMENDATION:   Patient would benefit from skilled PT however Patient refused further PT visits. No further PT visits planned.   avis

## 2020-08-31 NOTE — PROGRESS NOTES
side effects of the medication  5. Depression; in July we saw her we had started her on Lexapro as she was not happy at all about moving from her longtime home here to [facility] to live with her daughter. But the family determined she was unsafe at home  Inpatient course: Discharge summary reviewed- see chart. Interval history/Current status:   1. Fall  [facility] [place] [facility] [place] [place] but she has declined home health for PT;    2.  Leg pain  with chronic venous stasis changes; she has wound on left leg that is not open and  has put allevyn on it; They have suggested wound care;   3. Hypertension  Lisinopril 10 mg and amlodipine 5 mg; With lasix 40 mg daily   4. Dementia she is taking aricept 10 mg daily   5. Depression; she is taking lexapro that we started at her first visit in July ;   6. Anxiety with insomnia; We had also started her on lorazepam 0.5 mg at bedtime; Review of Systems -   General no fever chills no malaise fatigue  Diet no dietary restrictions  Skin no rash eruption pigment changes  HEENT no headache dizziness difficulty swallowing foods or medications   Neck no complaint of masses or pain  Chest  no cough shortness of breath exertional dyspnea  Cardiovascular no chest pain palpitations  Hematology no history of anemia or bruising    no urgency frequency nocturia hematuria  GI no indigestion, constipation or diarrhea    Musculoskeletal no joint pain or swelling  Neuro no numbness tingling coordination problems   Mental status no problems with  anxiety depression    General; alert, oriented, no acute distress. Skin wound left  leg . Chronic venous stasis changes    HEENT head is atraumatic. Pupils equal, reactive light and accommodation. Neck is supple without masses. Chest is clear without rales, rhonchi. Cardiovascular S1, S2 without murmur. Blood vessels no cyanosis, clubbing peripheral edema. Abdomen is soft.  Bowel sounds  present   Musculoskeletal adequate tone range of motion and strength   lymph there is no tenderness enlargement   Neuro cranial nerves II through XII are grossly intact with facial reflexes are intact psych   Blood vessels distal cyanosis clubbing or peripheral edema  Psych alert and oriented x3 appropriate mood and affect        Vitals:    08/31/20 1341   BP: 105/65   Pulse: 68     There is no height or weight on file to calculate BMI. Wt Readings from Last 3 Encounters:   08/07/20 125 lb (56.7 kg)   07/07/20 125 lb (56.7 kg)   07/01/20 124 lb (56.2 kg)     BP Readings from Last 3 Encounters:   08/31/20 105/65   08/13/20 132/67   07/07/20 120/76          Assessment/Plan:  1. Anxiety    - LORazepam (ATIVAN) 0.5 MG tablet; TAKE ONE TABLET BY MOUTH EVERY NIGHT AT BEDTIME **MUST LAST 30 DAYS**  Dispense: 30 tablet; Refill: 0    2.     1.  Hypertension;   Decrease lasix to 1/2 of the 40 mg pill daily   Continue with the other b/p meds; Lisinopril and amlodipine  2. Depression  Continue with lexapro  3. Anxiety; continue with lorazepam   #4 leg pain with chronic wound; the daughter is going to talk to home health and let them go ahead with wound care as well. #5 depression we will continue on the Lexapro.   #6 dementia we will continue with Aricept    Medical Decision Making: moderate complexity

## 2020-08-31 NOTE — PATIENT INSTRUCTIONS
1.  Hypertension;   Decrease lasix to 1/2 of the 40 mg pill daily   Continue with the other b/p meds; Lisinopril and amlodipine  2. Depression  Continue with lexapro  3. Anxiety; continue with lorazepam   #4 leg pain with chronic wound; the daughter is going to talk to home health and let them go ahead with wound care as well. #5 depression we will continue on the Lexapro.   #6 dementia we will continue with Aricept

## 2020-09-01 ENCOUNTER — TELEPHONE (OUTPATIENT)
Dept: INTERNAL MEDICINE CLINIC | Age: 85
End: 2020-09-01

## 2020-09-01 NOTE — TELEPHONE ENCOUNTER
[facility] PT reporting pt has changed her mind and now wants PT   Is it okay if they try again to eval her  ?

## 2020-09-28 ENCOUNTER — HOSPITAL ENCOUNTER (OUTPATIENT)
Dept: GENERAL RADIOLOGY | Age: 85
Discharge: HOME | End: 2020-09-28
Payer: MEDICARE

## 2020-09-28 ENCOUNTER — OFFICE VISIT (OUTPATIENT)
Dept: INTERNAL MEDICINE | Age: 85
End: 2020-09-28
Payer: MEDICARE

## 2020-09-28 VITALS — SYSTOLIC BLOOD PRESSURE: 148 MMHG | HEART RATE: 88 BPM | DIASTOLIC BLOOD PRESSURE: 68 MMHG

## 2020-09-28 PROCEDURE — 99213 OFFICE O/P EST LOW 20 MIN: CPT | Performed by: NURSE PRACTITIONER

## 2020-09-28 PROCEDURE — 73030 X-RAY EXAM OF SHOULDER: CPT

## 2020-09-28 RX ORDER — LORAZEPAM 1 MG/1
1 TABLET ORAL NIGHTLY PRN
Qty: 20 TABLET | Refills: 0 | Status: SHIPPED | OUTPATIENT
Start: 2020-09-28 | End: 2020-10-20

## 2020-09-28 ASSESSMENT — ENCOUNTER SYMPTOMS
CHOKING: 0
COUGH: 0
WHEEZING: 0
DIARRHEA: 0
CONSTIPATION: 0
EYE DISCHARGE: 0
COLOR CHANGE: 0
ABDOMINAL DISTENTION: 0
SHORTNESS OF BREATH: 0
TROUBLE SWALLOWING: 0
BLOOD IN STOOL: 0
EYE ITCHING: 0
NAUSEA: 0
STRIDOR: 0
VOMITING: 0
SORE THROAT: 0
ABDOMINAL PAIN: 0

## 2020-09-28 NOTE — PATIENT INSTRUCTIONS
1.  Right shoulder pain we will get x-ray today. We will call you tomorrow with results and we can determine at that time if you need a referral or if we need to be some pain medications  2.   Anxiety I have refilled the Ativan but increase to 1 mg at bedtime as needed for sleep

## 2020-09-28 NOTE — PROGRESS NOTES
[facility] INTERNAL MEDICINE  [address] [id number] [address] [address]  Dept: [phone]  Dept Fax: [phone]  Loc: [phone]    Esperanza Beltrán is a 80 y.o. female who presents today for her medical conditions/complaints as noted below. Esperanza Beltrán is c/[place] Other (rt arm weakness)        HPI:     HPI   1. Right shoulder pain and limited ROM   She has had no obvious injury. She had increasing levels of pain in the right shoulder down to the humerus and she has very limited range of motion of her right arm she cannot raise it overhead or put her arm behind her back. Is gotten where she has more pain and more bad days than good so they want to see if there is anything that could be done. #2 anxiety and resulting insomnia. She has been on half milligram of Ativan at night but that does not seem to be helping. Her daughter brought her bottle and today she apparently has got some moisture into her Ativan there are several left and there but you cannot even pick one up it just turns to powder. I told him his not be safe for them to continue to use that so I written her another prescription and increase the milligrams to 1 at bedtime. Also advised her that her insurance may not pay for this since it could be a few days early refill.   Chief Complaint   Patient presents with    Other     rt arm weakness       Past Medical History:   Diagnosis Date    Anxiety     Depression     Hx of blood clots     Hypertension       Past Surgical History:   Procedure Laterality Date    APPENDECTOMY      ARM SURGERY      HYSTERECTOMY, TOTAL ABDOMINAL      LEG SURGERY         Vitals 9/28/2020 8/31/2020 8/13/2020 8/13/2020 8/13/2020 4/08/0896   SYSTOLIC 681 063 064 - 828 645   DIASTOLIC 68 65 67 - 74 78   Pulse 88 68 78 - 80 79   Temp - - 97.7 - 96.8 96.8   Resp - - 20 - 20 20   SpO2 - - 93 - 94 94   Weight - - - - - -   Height - - - - - -   BMI (wt*703/ht~2) - - - - - -   Pain Level - - - 7 - -   Some recent data might be hidden       Family History   Family history unknown: Yes       Social History     Tobacco Use    Smoking status: Never Smoker    Smokeless tobacco: Never Used   Substance Use Topics    Alcohol use: Never     Frequency: Never      Current Outpatient Medications   Medication Sig Dispense Refill    LORazepam (ATIVAN) 1 MG tablet Take 1 tablet by mouth nightly as needed for Anxiety for up to 30 days. 20 tablet 0    amLODIPine (NORVASC) 5 MG tablet Take 1 tablet by mouth daily 90 tablet 1    donepezil (ARICEPT) 10 MG tablet Take 1 tablet by mouth nightly 90 tablet 1    escitalopram (LEXAPRO) 10 MG tablet Take 1 tablet by mouth daily 90 tablet 1    furosemide (LASIX) 40 MG tablet Take 1 tablet by mouth 2 times daily 180 tablet 1    lisinopril (PRINIVIL;ZESTRIL) 20 MG tablet Take 1 tablet by mouth daily 90 tablet 1    diclofenac sodium (VOLTAREN) 1 % GEL Apply 2 g topically 2 times daily 1 Tube 3    aspirin EC 81 MG EC tablet Take 1 tablet by mouth daily 90 tablet 1     No current facility-administered medications for this visit.       No Known Allergies    Health Maintenance   Topic Date Due    DTaP/Tdap/Td vaccine (1 - Tdap) 11/13/1947    Annual Wellness Visit (AWV)  07/01/2020    Flu vaccine (1) 09/01/2020    Shingles Vaccine (1 of 2) 07/01/2021 (Originally 11/13/1978)    Pneumococcal 65+ years Vaccine (1 of 1 - PPSV23) 07/15/2030 (Originally 11/13/1993)    Potassium monitoring  08/12/2021    Creatinine monitoring  08/12/2021    Hepatitis A vaccine  Aged Out    Hepatitis B vaccine  Aged Out    Hib vaccine  Aged Out    Meningococcal (ACWY) vaccine  Aged Out       No results found for: LABA1C  No results found for: PSA, PSADIA  TSH   Date Value Ref Range Status   07/01/2020 2.920 0.270 - 4.200 uIU/mL Final   ]  Lab Results   Component Value Date     08/12/2020    K 3.9 08/12/2020     08/12/2020    CO2 24 08/12/2020    BUN 15 08/12/2020 CREATININE 0.5 08/12/2020    GLUCOSE 101 08/12/2020    CALCIUM 9.0 08/12/2020    PROT 6.3 (L) 08/12/2020    LABALBU 3.4 (L) 08/12/2020    BILITOT 0.9 08/12/2020    ALKPHOS 95 08/12/2020    AST 17 08/12/2020    ALT 11 08/12/2020    LABGLOM >60 08/12/2020    GFRAA >59 08/12/2020     Lab Results   Component Value Date    CHOL 177 07/01/2020     Lab Results   Component Value Date    TRIG 90 07/01/2020     Lab Results   Component Value Date    HDL 59 (L) 07/01/2020     Lab Results   Component Value Date    LDLCALC 100 07/01/2020     Lab Results   Component Value Date     08/12/2020    K 3.9 08/12/2020    K 4.1 08/07/2020     08/12/2020    CO2 24 08/12/2020    BUN 15 08/12/2020    CREATININE 0.5 08/12/2020    GLUCOSE 101 08/12/2020    CALCIUM 9.0 08/12/2020      Lab Results   Component Value Date    WBC 8.3 08/12/2020    HGB 12.5 08/12/2020    HCT 39.2 08/12/2020    MCV 94.2 08/12/2020     08/12/2020    LYMPHOPCT 7.3 (L) 08/07/2020    RBC 4.16 (L) 08/12/2020    MCH 30.0 08/12/2020    MCHC 31.9 (L) 08/12/2020    RDW 12.7 08/12/2020     Lab Results   Component Value Date    VITD25 31.6 07/01/2020       Subjective:      Review of Systems   Constitutional: Negative for fatigue, fever and unexpected weight change. HENT: Negative for ear discharge, ear pain, mouth sores, sore throat and trouble swallowing. Eyes: Negative for discharge, itching and visual disturbance. Respiratory: Negative for cough, choking, shortness of breath, wheezing and stridor. Cardiovascular: Negative for chest pain, palpitations and leg swelling. Gastrointestinal: Negative for abdominal distention, abdominal pain, blood in stool, constipation, diarrhea, nausea and vomiting. Endocrine: Negative for cold intolerance, polydipsia and polyuria. Genitourinary: Negative for difficulty urinating, dysuria, frequency and urgency. Musculoskeletal: Negative for arthralgias and gait problem.         Right shoulder pain   Skin: Negative for color change and rash. Allergic/Immunologic: Negative for food allergies and immunocompromised state. Neurological: Negative for dizziness, tremors, syncope, speech difficulty, weakness and headaches. Hematological: Negative for adenopathy. Does not bruise/bleed easily. Psychiatric/Behavioral: Negative for confusion and hallucinations. Objective:     Physical Exam  Constitutional:       General: She is not in acute distress. Appearance: She is well-developed. HENT:      Head: Normocephalic and atraumatic. Eyes:      General: No scleral icterus. Right eye: No discharge. Left eye: No discharge. Pupils: Pupils are equal, round, and reactive to light. Neck:      Musculoskeletal: Normal range of motion and neck supple. Thyroid: No thyromegaly. Vascular: No JVD. Cardiovascular:      Rate and Rhythm: Normal rate and regular rhythm. Heart sounds: Normal heart sounds. No murmur. Pulmonary:      Effort: Pulmonary effort is normal. No respiratory distress. Breath sounds: Normal breath sounds. No wheezing or rales. Abdominal:      General: Bowel sounds are normal. There is no distension. Palpations: Abdomen is soft. There is no mass. Tenderness: There is no abdominal tenderness. There is no guarding or rebound. Musculoskeletal:         General: No tenderness. Arms:       Comments: Right shoulder pain with limited range of motion. sHe cannot really raise her arm over her head. She is having a lot of pain down the shoulder upper arm. SHe has had no recent injury    Skin:     General: Skin is warm and dry. Findings: No erythema or rash. Neurological:      Mental Status: She is alert and oriented to person, place, and time. Cranial Nerves: No cranial nerve deficit. Coordination: Coordination normal.      Deep Tendon Reflexes: Reflexes are normal and symmetric.  Reflexes normal.   Psychiatric:         Mood and Affect: Mood is not depressed. Behavior: Behavior normal.         Thought Content: Thought content normal.         Judgment: Judgment normal.       BP (!) 148/68   Pulse 88     Assessment:       Diagnosis Orders   1. Chronic right shoulder pain  XR SHOULDER RIGHT (MIN 2 VIEWS)   2. Anxiety  LORazepam (ATIVAN) 1 MG tablet       Diagnostics reviewed from today    Plan:        Patient given educational materials - see patient instructions. Discussed use, benefit, and side effects of prescribed medications. Allpatient questions answered. Pt voiced understanding. Reviewed health maintenance. Instructed to continue current medications, diet and exercise. Patient agreed with treatment plan. Follow up as directed. MEDICATIONS:  Orders Placed This Encounter   Medications    LORazepam (ATIVAN) 1 MG tablet     Sig: Take 1 tablet by mouth nightly as needed for Anxiety for up to 30 days. Dispense:  20 tablet     Refill:  0         ORDERS:  Orders Placed This Encounter   Procedures    XR SHOULDER RIGHT (MIN 2 VIEWS)       Follow-up:  Return in about 3 months (around 12/28/2020) for keep fu appt, have labs done prior to appt. PATIENT INSTRUCTIONS:  Patient Instructions   1. Right shoulder pain we will get x-ray today. We will call you tomorrow with results and we can determine at that time if you need a referral or if we need to be some pain medications  2. Anxiety I have refilled the Ativan but increase to 1 mg at bedtime as needed for sleep    Electronically signed by GEOFF Pinzon on 9/28/2020 at 3:44 PM    EMRDragon/transcription disclaimer:  Much of this encounter note is electronic transcription/translation of spoken language to printed texts. The electronic translation of spoken language may be erroneous, or at times,nonsensical words or phrases may be inadvertently transcribed.   Although I have reviewed the note for such errors, some may still exist.

## 2020-09-29 ENCOUNTER — TELEPHONE (OUTPATIENT)
Dept: INTERNAL MEDICINE | Age: 85
End: 2020-09-29

## 2020-09-29 RX ORDER — CELECOXIB 200 MG/1
200 CAPSULE ORAL DAILY
Qty: 60 CAPSULE | Refills: 3 | Status: ON HOLD
Start: 2020-09-29 | End: 2021-03-04 | Stop reason: HOSPADM

## 2020-09-29 NOTE — TELEPHONE ENCOUNTER
Patient has [facility] coming in this week sometime, pt can walk and is not experiencing any pain.

## 2020-09-29 NOTE — TELEPHONE ENCOUNTER
Patient daughter called and stated when she took off pt socks she noticed swelling all around ankle area and needed to know what to do about it. Please advise.

## 2020-09-29 NOTE — TELEPHONE ENCOUNTER
If she can walk on it okay there is probably nothing we have to do about it. But if she is having pain she can certainly bring over tomorrow and we can x-ray it. If they want to do that you can just call in the order downstairs.   I can call them with the results or I can see her whichever one they want today

## 2020-10-01 ENCOUNTER — TELEPHONE (OUTPATIENT)
Dept: INTERNAL MEDICINE CLINIC | Age: 85
End: 2020-10-01

## 2020-10-20 RX ORDER — LORAZEPAM 1 MG/1
TABLET ORAL
Qty: 20 TABLET | Refills: 0 | Status: SHIPPED | OUTPATIENT
Start: 2020-10-20 | End: 2020-11-16

## 2020-10-20 NOTE — TELEPHONE ENCOUNTER
Ninfa Ley called requesting a refill of the below medication which has been pended for you:     Requested Prescriptions     Pending Prescriptions Disp Refills    LORazepam (ATIVAN) 1 MG tablet [Pharmacy Med Name: LORazepam 1 MG TABLET] 20 tablet 0     Sig: TAKE ONE TABLET BY MOUTH EVERY NIGHT AT BEDTIME AS NEEDED FOR ANXIETY       Last Appointment Date: 9/28/2020  Next Appointment Date: 12/2/2020    No Known Allergies

## 2020-11-16 RX ORDER — LORAZEPAM 1 MG/1
TABLET ORAL
Qty: 20 TABLET | Refills: 0 | Status: SHIPPED | OUTPATIENT
Start: 2020-11-16 | End: 2020-12-09

## 2020-12-09 ENCOUNTER — VIRTUAL VISIT (OUTPATIENT)
Dept: INTERNAL MEDICINE | Age: 85
End: 2020-12-09
Payer: MEDICARE

## 2020-12-09 PROCEDURE — G0439 PPPS, SUBSEQ VISIT: HCPCS | Performed by: NURSE PRACTITIONER

## 2020-12-09 RX ORDER — LORAZEPAM 1 MG/1
TABLET ORAL
Qty: 45 TABLET | Refills: 0 | Status: SHIPPED | OUTPATIENT
Start: 2020-12-09 | End: 2021-01-14

## 2020-12-09 ASSESSMENT — PATIENT HEALTH QUESTIONNAIRE - PHQ9
1. LITTLE INTEREST OR PLEASURE IN DOING THINGS: 3
7. TROUBLE CONCENTRATING ON THINGS, SUCH AS READING THE NEWSPAPER OR WATCHING TELEVISION: 0
SUM OF ALL RESPONSES TO PHQ QUESTIONS 1-9: 8
2. FEELING DOWN, DEPRESSED OR HOPELESS: 3
8. MOVING OR SPEAKING SO SLOWLY THAT OTHER PEOPLE COULD HAVE NOTICED. OR THE OPPOSITE, BEING SO FIGETY OR RESTLESS THAT YOU HAVE BEEN MOVING AROUND A LOT MORE THAN USUAL: 0
SUM OF ALL RESPONSES TO PHQ9 QUESTIONS 1 & 2: 6
3. TROUBLE FALLING OR STAYING ASLEEP: 1
10. IF YOU CHECKED OFF ANY PROBLEMS, HOW DIFFICULT HAVE THESE PROBLEMS MADE IT FOR YOU TO DO YOUR WORK, TAKE CARE OF THINGS AT HOME, OR GET ALONG WITH OTHER PEOPLE: 0
4. FEELING TIRED OR HAVING LITTLE ENERGY: 1
9. THOUGHTS THAT YOU WOULD BE BETTER OFF DEAD, OR OF HURTING YOURSELF: 0
SUM OF ALL RESPONSES TO PHQ QUESTIONS 1-9: 8
6. FEELING BAD ABOUT YOURSELF - OR THAT YOU ARE A FAILURE OR HAVE LET YOURSELF OR YOUR FAMILY DOWN: 0
SUM OF ALL RESPONSES TO PHQ QUESTIONS 1-9: 8
5. POOR APPETITE OR OVEREATING: 0

## 2020-12-09 ASSESSMENT — LIFESTYLE VARIABLES: HOW OFTEN DO YOU HAVE A DRINK CONTAINING ALCOHOL: 0

## 2020-12-09 NOTE — PROGRESS NOTES
Medicare Annual Wellness Visit  Are Name: Susan Harp Date: 2020   MRN: [id number] Sex: Female   Age: 80 y.o. Ethnicity: Non-/Non    : 1928 Race: Chapito Thomas is here for Medicare AWV (Telehealth Medicare Wellness Visit)    Screenings for behavioral, psychosocial and functional/safety risks, and cognitive dysfunction are all negative except as indicated below. These results, as well as other patient data from the [address] form, are documented in Flowsheets linked to this Encounter. No Known Allergies      Prior to Visit Medications    Medication Sig Taking?  Authorizing Provider   LORazepam (ATIVAN) 1 MG tablet Take 1 at bedtime and you can take extra 1/2 during the day if needed Yes Hiral Lacrosse, APRN   celecoxib (CELEBREX) 200 MG capsule Take 1 capsule by mouth daily Yes Hiral Lacrosse, APRN   amLODIPine (NORVASC) 5 MG tablet Take 1 tablet by mouth daily Yes Hiral Lacrosse, APRN   donepezil (ARICEPT) 10 MG tablet Take 1 tablet by mouth nightly Yes Hiral Lacrosse, APRN   escitalopram (LEXAPRO) 10 MG tablet Take 1 tablet by mouth daily Yes Hiral Lacrosse, APRN   furosemide (LASIX) 40 MG tablet Take 1 tablet by mouth 2 times daily Yes Hiral Lacrosse, APRN   lisinopril (PRINIVIL;ZESTRIL) 20 MG tablet Take 1 tablet by mouth daily Yes Hiral Lacrosse, APRN   diclofenac sodium (VOLTAREN) 1 % GEL Apply 2 g topically 2 times daily Yes Inés Bryson MD   aspirin EC 81 MG EC tablet Take 1 tablet by mouth daily Yes Hiral Lacrosse APRN         Past Medical History:   Diagnosis Date    Anxiety     Depression     Hx of blood clots     Hypertension        Past Surgical History:   Procedure Laterality Date    APPENDECTOMY      ARM SURGERY      HYSTERECTOMY, TOTAL ABDOMINAL      LEG SURGERY           Family History   Family history unknown: Yes       CareTeam (Including outside providers/suppliers regularly involved in providing care):   Patient Care Team:  GEOFF Hsusein as PCP - General (Nurse Practitioner Acute Care)  GEOFF Hussein as PCP - [facility] Empaneled Provider    Wt Readings from Last 3 Encounters:   08/07/20 125 lb (56.7 kg)   07/07/20 125 lb (56.7 kg)   07/01/20 124 lb (56.2 kg)      No flowsheet data found. There is no height or weight on file to calculate BMI. Based upon direct observation of the patient, evaluation of cognition reveals global memory impairment noted. DGEVISITPE      GENERAL:   Afebrile alert no acute distress  Respiratory no use of accessory muscles no acute distress no audible wheezing  Mental status alert oriented adequate thought processes        Patient's complete Health Risk Assessment and screening values have been reviewed and are found in Flowsheets. The following problems were reviewed today and where indicated follow up appointments were made and/or referrals ordered. Positive Risk Factor Screenings with Interventions:     Fall Risk:  2 or more falls in past year?: (!) yes  Fall with injury in past year?: no  Fall Risk Interventions:    · no injury with the fall;      Depression:  PHQ-2 Score: 6  PHQ-9 Total Score: 8    Severity:1-4 = minimal depression, 5-9 = mild depression, 10-14 = moderate depression, 15-19 = moderately severe depression, 20-27 = severe depression  Depression Interventions:  · less crying ;  still misses and talks about her ;      General Health and ACP:  General  In general, how would you say your health is?: Fair  In the past 7 days, have you experienced any of the following?  New or Increased Pain, New or Increased Fatigue, Loneliness, Social Isolation, Stress or Anger?: (!) New or Increased Fatigue  Do you get the social and emotional support that you need?: Yes  Do you have a Living Will?: (!) No  Advance Directives     Power of  Living Will ACP-Advance Directive ACP-Power of     Not on File Not on [address] Interventions:  · No Living Will: Patient declines ACP discussion/assistance    Health Habits/Nutrition:  Health Habits/Nutrition  Do you exercise for at least 20 minutes 2-3 times per week?: (!) No  Have you lost any weight without trying in the past 3 months?: No  Do you eat fewer than 2 meals per day?: No  Have you seen a dentist within the past year?: Yes     Health Habits/Nutrition Interventions:  · Inadequate physical activity:  she is 89; Hearing/Vision:  No exam data present  Hearing/Vision  Do you or your family notice any trouble with your hearing?: No  Do you have difficulty driving, watching TV, or doing any of your daily activities because of your eyesight?: (!) Yes  Have you had an eye exam within the past year?: (!) No  Hearing/Vision Interventions:  · Vision concerns:  patient declines any further evaluation/treatment for this issue    ADL:  ADLs  In the past 7 days, did you need help from others to perform any of the following everyday activities? Eating, dressing, grooming, bathing, toileting, or walking/balance?: (!) Dressing, Grooming, Bathing, Toileting, Walking/Balance  In the past 7 days, did you need help from others to take care of any of the following? Laundry, housekeeping, banking/finances, shopping, telephone use, food preparation, transportation, or taking medications?: [employer], Housekeeping, Banking/Finances, Shopping, Telephone Use, Food Preparation, Transportation, Taking Medications  ADL Interventions:  · she lives with her daughter;      Personalized Preventive Plan   Current Health Maintenance Status    There is no immunization history on file for this patient.      Health Maintenance   Topic Date Due    Annual Wellness Visit (AWV)  07/01/2020    DTaP/Tdap/Td vaccine (1 - Tdap) 12/23/2020 (Originally 11/13/1947)    Shingles Vaccine (1 of 2) 07/01/2021 (Originally 11/13/1978)    Flu vaccine (1) 12/09/2021 (Originally 9/1/2020)    Pneumococcal 65+ years Vaccine (1 of 1

## 2020-12-09 NOTE — PATIENT INSTRUCTIONS
Personalized Preventive Plan for Kelsey Varela - 12/9/2020  Medicare offers a range of preventive health benefits. Some of the tests and screenings are paid in full while other may be subject to a deductible, co-insurance, and/or copay. Some of these benefits include a comprehensive review of your medical history including lifestyle, illnesses that may run in your family, and various assessments and screenings as appropriate. After reviewing your medical record and screening and assessments performed today your provider may have ordered immunizations, labs, imaging, and/or referrals for you. A list of these orders (if applicable) as well as your Preventive Care list are included within your After Visit Summary for your review. Other Preventive Recommendations:    · A preventive eye exam performed by an eye specialist is recommended every 1-2 years to screen for glaucoma; cataracts, macular degeneration, and other eye disorders. · A preventive dental visit is recommended every 6 months. · Try to get at least 150 minutes of exercise per week or 10,000 steps per day on a pedometer . · Order or download the FREE \"Exercise & Physical Activity: Your Everyday Guide\" from [employer]. Call 6-794.626.7304 or search [employer] online. · You need 9650-3840 mg of calcium and 7187-6895 IU of vitamin D per day. It is possible to meet your calcium requirement with diet alone, but a vitamin D supplement is usually necessary to meet this goal.  · When exposed to the sun, use a sunscreen that protects against both UVA and UVB radiation with an SPF of 30 or greater. Reapply every 2 to 3 hours or after sweating, drying off with a towel, or swimming. · Always wear a seat belt when traveling in a car. Always wear a helmet when riding a bicycle or motorcycle.

## 2020-12-22 ENCOUNTER — VIRTUAL VISIT (OUTPATIENT)
Dept: INTERNAL MEDICINE | Age: 85
End: 2020-12-22
Payer: MEDICARE

## 2020-12-22 PROCEDURE — 99441 PR PHYS/QHP TELEPHONE EVALUATION 5-10 MIN: CPT | Performed by: NURSE PRACTITIONER

## 2020-12-22 ASSESSMENT — ENCOUNTER SYMPTOMS
COUGH: 0
ABDOMINAL PAIN: 0
SORE THROAT: 0
TROUBLE SWALLOWING: 0
VOMITING: 0
ABDOMINAL DISTENTION: 0
EYE ITCHING: 0
STRIDOR: 0
CHOKING: 0
NAUSEA: 0
BLOOD IN STOOL: 0
CONSTIPATION: 0
COLOR CHANGE: 0
EYE DISCHARGE: 0
WHEEZING: 0
SHORTNESS OF BREATH: 0
DIARRHEA: 0

## 2020-12-22 NOTE — PROGRESS NOTES
[address] [facility]  [address] [address] [address] Mariah [place]ibio [address]  Dept: [phone]  Dept Fax: [phone]  Loc: [phone]    Nic Giraldo is a 80 y.o. female who were are performing tele health communication  for her medical conditions/complaints as noted below. Currently, our state is under umbrella of national state of emergency and we are using alternative methods of taking care of the needs of our patients so they are not exposed in our office; The patient has consented to this form of communication  Nic Giraldo is c/[place]   Joint Pain    THE patient is at home  James Hull is at office   Others in attendance are her daughter     HPI:     HPI   3. Right shoulder  Pain ; she was seen 9/2020 and had xrays done;  Just showed arthritis; She continues to complain about that;  Daughter says fatoumata is not working;   She wants to use some naproxen she has at home;      Chief Complaint   Patient presents with    Joint Pain       Past Medical History:   Diagnosis Date    Anxiety     Depression     Hx of blood clots     Hypertension       Past Surgical History:   Procedure Laterality Date    APPENDECTOMY      ARM SURGERY      HYSTERECTOMY, TOTAL ABDOMINAL      LEG SURGERY         Vitals 9/28/2020 8/31/2020 8/13/2020 8/13/2020 8/13/2020 7/28/4766   SYSTOLIC 195 565 001 - 579 573   DIASTOLIC 68 65 67 - 74 78   Pulse 88 68 78 - 80 79   Temp - - 97.7 - 96.8 96.8   Resp - - 20 - 20 20   SpO2 - - 93 - 94 94   Weight - - - - - -   Height - - - - - -   Body mass index - - - - - -   Pain Level - - - 7 - -   Some recent data might be hidden       Family History   Family history unknown: Yes       Social History     Tobacco Use    Smoking status: Never Smoker    Smokeless tobacco: Never Used   Substance Use Topics    Alcohol use: Never     Frequency: Never      Current Outpatient Medications   Medication Sig Dispense Refill  LORazepam (ATIVAN) 1 MG tablet Take 1 at bedtime and you can take extra 1/2 during the day if needed 45 tablet 0    celecoxib (CELEBREX) 200 MG capsule Take 1 capsule by mouth daily 60 capsule 3    amLODIPine (NORVASC) 5 MG tablet Take 1 tablet by mouth daily 90 tablet 1    donepezil (ARICEPT) 10 MG tablet Take 1 tablet by mouth nightly 90 tablet 1    escitalopram (LEXAPRO) 10 MG tablet Take 1 tablet by mouth daily 90 tablet 1    furosemide (LASIX) 40 MG tablet Take 1 tablet by mouth 2 times daily 180 tablet 1    lisinopril (PRINIVIL;ZESTRIL) 20 MG tablet Take 1 tablet by mouth daily 90 tablet 1    diclofenac sodium (VOLTAREN) 1 % GEL Apply 2 g topically 2 times daily 1 Tube 3    aspirin EC 81 MG EC tablet Take 1 tablet by mouth daily 90 tablet 1     No current facility-administered medications for this visit.       No Known Allergies    Health Maintenance   Topic Date Due    DTaP/Tdap/Td vaccine (1 - Tdap) 12/23/2020 (Originally 11/13/1947)    Shingles Vaccine (1 of 2) 07/01/2021 (Originally [birth year]/1978)    Flu vaccine (1) 12/09/2021 (Originally 9/1/2020)    Pneumococcal 65+ years Vaccine (1 of 1 - PPSV23) 07/15/2030 (Originally 11/13/1993)    Potassium monitoring  08/12/2021    Creatinine monitoring  08/12/2021    Annual Wellness Visit (AWV)  12/10/2021    Hepatitis A vaccine  Aged Out    Hepatitis B vaccine  Aged Out    Hib vaccine  Aged Out    Meningococcal (ACWY) vaccine  Aged Out       No results found for: LABA1C  No results found for: PSA, PSADIA  TSH   Date Value Ref Range Status   07/01/2020 2.920 0.270 - 4.200 uIU/mL Final   ]  Lab Results   Component Value Date     08/12/2020    K 3.9 08/12/2020     08/12/2020    CO2 24 08/12/2020    BUN 15 08/12/2020    CREATININE 0.5 08/12/2020    GLUCOSE 101 08/12/2020    CALCIUM 9.0 08/12/2020    PROT 6.3 (L) 08/12/2020    LABALBU 3.4 (L) 08/12/2020    BILITOT 0.9 08/12/2020    ALKPHOS 95 08/12/2020    AST 17 08/12/2020 ALT 11 08/12/2020    LABGLOM >60 08/12/2020    GFRAA >59 08/12/2020     Lab Results   Component Value Date    CHOL 177 07/01/2020     Lab Results   Component Value Date    TRIG 90 07/01/2020     Lab Results   Component Value Date    HDL 59 (L) 07/01/2020     Lab Results   Component Value Date    LDLCALC 100 07/01/2020     Lab Results   Component Value Date     08/12/2020    K 3.9 08/12/2020    K 4.1 08/07/2020     08/12/2020    CO2 24 08/12/2020    BUN 15 08/12/2020    CREATININE 0.5 08/12/2020    GLUCOSE 101 08/12/2020    CALCIUM 9.0 08/12/2020      Lab Results   Component Value Date    WBC 8.3 08/12/2020    HGB 12.5 08/12/2020    HCT 39.2 08/12/2020    MCV 94.2 08/12/2020     08/12/2020    LYMPHOPCT 7.3 (L) 08/07/2020    RBC 4.16 (L) 08/12/2020    MCH 30.0 08/12/2020    MCHC 31.9 (L) 08/12/2020    RDW 12.7 08/12/2020     Lab Results   Component Value Date    VITD25 31.6 07/01/2020       Subjective:      Review of Systems   Constitutional: Negative for fatigue, fever and unexpected weight change. HENT: Negative for ear discharge, ear pain, mouth sores, sore throat and trouble swallowing. Eyes: Negative for discharge, itching and visual disturbance. Respiratory: Negative for cough, choking, shortness of breath, wheezing and stridor. Cardiovascular: Negative for chest pain, palpitations and leg swelling. Gastrointestinal: Negative for abdominal distention, abdominal pain, blood in stool, constipation, diarrhea, nausea and vomiting. Endocrine: Negative for cold intolerance, polydipsia and polyuria. Genitourinary: Negative for difficulty urinating, dysuria, frequency and urgency. Musculoskeletal: Positive for arthralgias. Negative for gait problem. Skin: Negative for color change and rash. Allergic/Immunologic: Negative for food allergies and immunocompromised state. Neurological: Negative for dizziness, tremors, syncope, speech difficulty, weakness and headaches. Hematological: Negative for adenopathy. Does not bruise/bleed easily. Psychiatric/Behavioral: Positive for confusion. Negative for hallucinations. Dementia       Objective:     Physical Exam   DGEVISITPE      GENERAL:   Afebrile alert no acute distress  Respiratory no use of accessory muscles no acute distress no audible wheezing  Mental status alert oriented adequate thought processes        Vital signs were not taken at this visit as no equipment was available; There were no vitals taken for this visit. Assessment:       Diagnosis Orders   1. Chronic right shoulder pain       Diagnostics reviewed from 9/2020  Plan:        Patient given educational materials - see patient instructions. Discussed use, benefit, and side effects of prescribed medications. Allpatient questions answered. Pt voiced understanding. Reviewed health maintenance. Instructed to continue current medications, diet and exercise. Patient agreed with treatment plan. Follow up as directed. MEDICATIONS:  No orders of the defined types were placed in this encounter. ORDERS:  No orders of the defined types were placed in this encounter. Follow-up:  No follow-ups on file. Following the remote visit today we will call you when we are available to reschedule your follow up appt      PATIENT INSTRUCTIONS:  Patient Instructions   1. Right shoulder pain; chronic ; We will try the naproxen;   If that does not work, please let me know     Electronically signed by GEOFF Singletary on 12/22/2020 at 3:41 PM   tele visit 10  minutes  We are communicating with telehealth for the 3 month fu for controlled substance Pursuant to the emergency declaration under the [facility][note 3][facility], [facility] waiver authority and the [employer] Act, this Virtual Visit was conducted, with patient's consent, to reduce the patient's risk of exposure to COVID-19 and provide continuity of care for an established patient.        EMRDragon/transcription disclaimer:  Much of this encounter note is electronic

## 2020-12-22 NOTE — PATIENT INSTRUCTIONS
1.  Right shoulder pain; chronic ; We will try the naproxen;   If that does not work, please let me know

## 2021-01-14 DIAGNOSIS — F41.9 ANXIETY: ICD-10-CM

## 2021-01-14 RX ORDER — LORAZEPAM 1 MG/1
TABLET ORAL
Qty: 45 TABLET | Refills: 0 | Status: SHIPPED | OUTPATIENT
Start: 2021-01-14 | End: 2021-02-22

## 2021-02-17 RX ORDER — AMLODIPINE BESYLATE 5 MG/1
TABLET ORAL
Qty: 90 TABLET | Refills: 1 | Status: SHIPPED | OUTPATIENT
Start: 2021-02-17

## 2021-02-22 DIAGNOSIS — F41.9 ANXIETY: ICD-10-CM

## 2021-02-22 RX ORDER — DONEPEZIL HYDROCHLORIDE 10 MG/1
TABLET, FILM COATED ORAL
Qty: 90 TABLET | Refills: 1 | Status: SHIPPED | OUTPATIENT
Start: 2021-02-22

## 2021-02-22 RX ORDER — ESCITALOPRAM OXALATE 10 MG/1
TABLET ORAL
Qty: 90 TABLET | Refills: 1 | Status: SHIPPED | OUTPATIENT
Start: 2021-02-22

## 2021-02-22 RX ORDER — LORAZEPAM 1 MG/1
TABLET ORAL
Qty: 45 TABLET | Refills: 0 | Status: SHIPPED | OUTPATIENT
Start: 2021-02-22 | End: 2021-04-09

## 2021-02-22 RX ORDER — LISINOPRIL 20 MG/1
TABLET ORAL
Qty: 90 TABLET | Refills: 1 | Status: ON HOLD | OUTPATIENT
Start: 2021-02-22 | End: 2021-03-04 | Stop reason: HOSPADM

## 2021-02-22 NOTE — TELEPHONE ENCOUNTER
Delia Showers called requesting a refill of the below medication which has been pended for you:     Requested Prescriptions     Pending Prescriptions Disp Refills    LORazepam (ATIVAN) 1 MG tablet [Pharmacy Med Name: LORAZEPAM TAB 1MG] 45 tablet 0     Sig: TAKE 1 TABLET AT BEDTIME   AND YOU CAN TAKE AN EXTRA  1/2 TABLET DURING THE DAY  IF NEEDED    lisinopril (PRINIVIL;ZESTRIL) 20 MG tablet [Pharmacy Med Name: LISINOPRIL TAB 20MG] 90 tablet 1     Sig: TAKE 1 TABLET DAILY    escitalopram (LEXAPRO) 10 MG tablet [Pharmacy Med Name: ESCITALOPRAM TAB 10MG] 90 tablet 1     Sig: TAKE 1 TABLET DAILY    donepezil (ARICEPT) 10 MG tablet [Pharmacy Med Name: DONEPEZIL TAB 10MG] 90 tablet 1     Sig: TAKE 1 TABLET NIGHTLY       Last Appointment Date: 12/22/2020  Next Appointment Date: 3/16/2021    No Known Allergies

## 2021-03-01 ENCOUNTER — ANESTHESIA (OUTPATIENT)
Dept: OPERATING ROOM | Age: 86
DRG: 481 | End: 2021-03-01
Payer: MEDICARE

## 2021-03-01 ENCOUNTER — ANESTHESIA EVENT (OUTPATIENT)
Dept: OPERATING ROOM | Age: 86
DRG: 481 | End: 2021-03-01
Payer: MEDICARE

## 2021-03-01 ENCOUNTER — VIRTUAL VISIT (OUTPATIENT)
Dept: INTERNAL MEDICINE | Age: 86
End: 2021-03-01
Payer: MEDICARE

## 2021-03-01 ENCOUNTER — HOSPITAL ENCOUNTER (INPATIENT)
Age: 86
LOS: 3 days | Discharge: HOME HEALTH | DRG: 481 | End: 2021-03-04
Attending: EMERGENCY MEDICINE | Admitting: FAMILY MEDICINE
Payer: MEDICARE

## 2021-03-01 ENCOUNTER — APPOINTMENT (OUTPATIENT)
Dept: GENERAL RADIOLOGY | Age: 86
DRG: 481 | End: 2021-03-01
Payer: MEDICARE

## 2021-03-01 ENCOUNTER — APPOINTMENT (OUTPATIENT)
Dept: CT IMAGING | Age: 86
DRG: 481 | End: 2021-03-01
Payer: MEDICARE

## 2021-03-01 VITALS
DIASTOLIC BLOOD PRESSURE: 44 MMHG | TEMPERATURE: 99.3 F | OXYGEN SATURATION: 91 % | RESPIRATION RATE: 5 BRPM | SYSTOLIC BLOOD PRESSURE: 88 MMHG

## 2021-03-01 DIAGNOSIS — S42.291A CLOSED FRACTURE OF ANATOMICAL NECK OF HUMERUS, RIGHT, INITIAL ENCOUNTER: ICD-10-CM

## 2021-03-01 DIAGNOSIS — D64.9 ANEMIA, UNSPECIFIED TYPE: ICD-10-CM

## 2021-03-01 DIAGNOSIS — G89.18 POSTOPERATIVE PAIN: ICD-10-CM
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
## 2021-03-01 DIAGNOSIS — S42.291A CLOSED 3-PART FRACTURE OF PROXIMAL END OF RIGHT HUMERUS, INITIAL ENCOUNTER: ICD-10-CM

## 2021-03-01 DIAGNOSIS — F03.90 DEMENTIA WITHOUT BEHAVIORAL DISTURBANCE, UNSPECIFIED DEMENTIA TYPE: ICD-10-CM

## 2021-03-01 DIAGNOSIS — S42.211A FX HUMERAL NECK, RIGHT, CLOSED, INITIAL ENCOUNTER: ICD-10-CM

## 2021-03-01 DIAGNOSIS — F01.50 VASCULAR DEMENTIA WITHOUT BEHAVIORAL DISTURBANCE (HCC): ICD-10-CM

## 2021-03-01 DIAGNOSIS — M25.551 HIP PAIN, ACUTE, RIGHT: ICD-10-CM

## 2021-03-01 DIAGNOSIS — S72.141A DISPLACED INTERTROCHANTERIC FRACTURE OF RIGHT FEMUR, INIT (HCC): ICD-10-CM

## 2021-03-01 DIAGNOSIS — N28.9 ACUTE RENAL INSUFFICIENCY: ICD-10-CM
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
## 2021-03-01 LAB
ABO/RH: NORMAL
ALBUMIN SERPL-MCNC: 3.2 G/DL (ref 3.5–5.2)
ALP BLD-CCNC: 80 U/L (ref 35–104)
ALT SERPL-CCNC: 13 U/L (ref 5–33)
ANION GAP SERPL CALCULATED.3IONS-SCNC: 13 MMOL/L (ref 7–19)
ANTIBODY SCREEN: NORMAL
APTT: 26.8 SEC (ref 26–36.2)
APTT: 30.7 SEC (ref 26–36.2)
AST SERPL-CCNC: 19 U/L (ref 5–32)
BASOPHILS ABSOLUTE: 0 K/UL (ref 0–0.2)
BASOPHILS RELATIVE PERCENT: 0.2 % (ref 0–1)
BILIRUB SERPL-MCNC: 0.7 MG/DL (ref 0.2–1.2)
BUN BLDV-MCNC: 40 MG/DL (ref 8–23)
CALCIUM SERPL-MCNC: 8.6 MG/DL (ref 8.2–9.6)
CHLORIDE BLD-SCNC: 99 MMOL/L (ref 98–111)
CO2: 25 MMOL/L (ref 22–29)
CREAT SERPL-MCNC: 2.9 MG/DL (ref 0.5–0.9)
EOSINOPHILS ABSOLUTE: 0.1 K/UL (ref 0–0.6)
EOSINOPHILS RELATIVE PERCENT: 0.3 % (ref 0–5)
FERRITIN: 422.7 NG/ML (ref 13–150)
GFR AFRICAN AMERICAN: 18
GFR NON-AFRICAN AMERICAN: 15
GLUCOSE BLD-MCNC: 140 MG/DL (ref 74–109)
HCT VFR BLD CALC: 28.3 % (ref 37–47)
HEMOGLOBIN: 9.2 G/DL (ref 12–16)
IMMATURE GRANULOCYTES #: 0.2 K/UL
INR BLD: 1.17 (ref 0.88–1.18)
INR BLD: 1.17 (ref 0.88–1.18)
IRON SATURATION: 8 % (ref 14–50)
IRON: 15 UG/DL (ref 37–145)
LYMPHOCYTES ABSOLUTE: 1.5 K/UL (ref 1.1–4.5)
LYMPHOCYTES RELATIVE PERCENT: 7.3 % (ref 20–40)
MCH RBC QN AUTO: 31.2 PG (ref 27–31)
MCHC RBC AUTO-ENTMCNC: 32.5 G/DL (ref 33–37)
MCV RBC AUTO: 95.9 FL (ref 81–99)
MONOCYTES ABSOLUTE: 1.5 K/UL (ref 0–0.9)
MONOCYTES RELATIVE PERCENT: 7.7 % (ref 0–10)
NEUTROPHILS ABSOLUTE: 16.6 K/UL (ref 1.5–7.5)
NEUTROPHILS RELATIVE PERCENT: 83.6 % (ref 50–65)
PDW BLD-RTO: 14.4 % (ref 11.5–14.5)
PLATELET # BLD: 227 K/UL (ref 130–400)
PMV BLD AUTO: 9.5 FL (ref 9.4–12.3)
POTASSIUM REFLEX MAGNESIUM: 5.3 MMOL/L (ref 3.5–5)
PROTHROMBIN TIME: 14.9 SEC (ref 12–14.6)
PROTHROMBIN TIME: 14.9 SEC (ref 12–14.6)
RBC # BLD: 2.95 M/UL (ref 4.2–5.4)
SARS-COV-2, NAAT: NOT DETECTED
SODIUM BLD-SCNC: 137 MMOL/L (ref 136–145)
TOTAL IRON BINDING CAPACITY: 191 UG/DL (ref 250–400)
TOTAL PROTEIN: 5.9 G/DL (ref 6.6–8.7)
WBC # BLD: 19.8 K/UL (ref 4.8–10.8)

## 2021-03-01 PROCEDURE — 7100000000 HC PACU RECOVERY - FIRST 15 MIN: Performed by: ORTHOPAEDIC SURGERY

## 2021-03-01 PROCEDURE — 80053 COMPREHEN METABOLIC PANEL: CPT

## 2021-03-01 PROCEDURE — 0QSB04Z REPOSITION RIGHT LOWER FEMUR WITH INTERNAL FIXATION DEVICE, OPEN APPROACH: ICD-10-PCS | Performed by: ORTHOPAEDIC SURGERY

## 2021-03-01 PROCEDURE — 86901 BLOOD TYPING SEROLOGIC RH(D): CPT

## 2021-03-01 PROCEDURE — 85730 THROMBOPLASTIN TIME PARTIAL: CPT

## 2021-03-01 PROCEDURE — 99442 PR PHYS/QHP TELEPHONE EVALUATION 11-20 MIN: CPT | Performed by: NURSE PRACTITIONER

## 2021-03-01 PROCEDURE — 3209999900 FLUORO FOR SURGICAL PROCEDURES

## 2021-03-01 PROCEDURE — 87635 SARS-COV-2 COVID-19 AMP PRB: CPT

## 2021-03-01 PROCEDURE — 73502 X-RAY EXAM HIP UNI 2-3 VIEWS: CPT

## 2021-03-01 PROCEDURE — 3700000001 HC ADD 15 MINUTES (ANESTHESIA): Performed by: ORTHOPAEDIC SURGERY

## 2021-03-01 PROCEDURE — 6360000002 HC RX W HCPCS: Performed by: NURSE ANESTHETIST, CERTIFIED REGISTERED

## 2021-03-01 PROCEDURE — 83550 IRON BINDING TEST: CPT

## 2021-03-01 PROCEDURE — 85610 PROTHROMBIN TIME: CPT

## 2021-03-01 PROCEDURE — 2709999900 HC NON-CHARGEABLE SUPPLY: Performed by: ORTHOPAEDIC SURGERY

## 2021-03-01 PROCEDURE — 7100000001 HC PACU RECOVERY - ADDTL 15 MIN: Performed by: ORTHOPAEDIC SURGERY

## 2021-03-01 PROCEDURE — 3700000000 HC ANESTHESIA ATTENDED CARE: Performed by: ORTHOPAEDIC SURGERY

## 2021-03-01 PROCEDURE — 2720000010 HC SURG SUPPLY STERILE: Performed by: ORTHOPAEDIC SURGERY

## 2021-03-01 PROCEDURE — 86850 RBC ANTIBODY SCREEN: CPT

## 2021-03-01 PROCEDURE — 3600000014 HC SURGERY LEVEL 4 ADDTL 15MIN: Performed by: ORTHOPAEDIC SURGERY

## 2021-03-01 PROCEDURE — 99283 EMERGENCY DEPT VISIT LOW MDM: CPT

## 2021-03-01 PROCEDURE — 85025 COMPLETE CBC W/AUTO DIFF WBC: CPT

## 2021-03-01 PROCEDURE — 2500000003 HC RX 250 WO HCPCS: Performed by: NURSE ANESTHETIST, CERTIFIED REGISTERED

## 2021-03-01 PROCEDURE — 82728 ASSAY OF FERRITIN: CPT

## 2021-03-01 PROCEDURE — 72125 CT NECK SPINE W/O DYE: CPT

## 2021-03-01 PROCEDURE — 86900 BLOOD TYPING SEROLOGIC ABO: CPT

## 2021-03-01 PROCEDURE — 86923 COMPATIBILITY TEST ELECTRIC: CPT

## 2021-03-01 PROCEDURE — 3600000004 HC SURGERY LEVEL 4 BASE: Performed by: ORTHOPAEDIC SURGERY

## 2021-03-01 PROCEDURE — 2580000003 HC RX 258: Performed by: ANESTHESIOLOGY

## 2021-03-01 PROCEDURE — P9016 RBC LEUKOCYTES REDUCED: HCPCS

## 2021-03-01 PROCEDURE — 2780000010 HC IMPLANT OTHER: Performed by: ORTHOPAEDIC SURGERY

## 2021-03-01 PROCEDURE — 70450 CT HEAD/BRAIN W/O DYE: CPT

## 2021-03-01 PROCEDURE — 73060 X-RAY EXAM OF HUMERUS: CPT

## 2021-03-01 PROCEDURE — 1210000000 HC MED SURG R&B

## 2021-03-01 PROCEDURE — 83540 ASSAY OF IRON: CPT

## 2021-03-01 PROCEDURE — 93005 ELECTROCARDIOGRAM TRACING: CPT | Performed by: FAMILY MEDICINE

## 2021-03-01 PROCEDURE — C1769 GUIDE WIRE: HCPCS | Performed by: ORTHOPAEDIC SURGERY

## 2021-03-01 PROCEDURE — C1713 ANCHOR/SCREW BN/BN,TIS/BN: HCPCS | Performed by: ORTHOPAEDIC SURGERY

## 2021-03-01 PROCEDURE — 36415 COLL VENOUS BLD VENIPUNCTURE: CPT

## 2021-03-01 PROCEDURE — 73552 X-RAY EXAM OF FEMUR 2/>: CPT

## 2021-03-01 PROCEDURE — 71045 X-RAY EXAM CHEST 1 VIEW: CPT

## 2021-03-01 DEVICE — IMPLANTABLE DEVICE: Type: IMPLANTABLE DEVICE | Site: HIP | Status: FUNCTIONAL

## 2021-03-01 DEVICE — SCREW BNE L36MM DIA5MM TIB LT GRN TI ST CANN LOK FULL THRD: Type: IMPLANTABLE DEVICE | Site: HIP | Status: FUNCTIONAL

## 2021-03-01 RX ORDER — ONDANSETRON 2 MG/ML
4 INJECTION INTRAMUSCULAR; INTRAVENOUS
Status: DISCONTINUED | OUTPATIENT
Start: 2021-03-01 | End: 2021-03-01 | Stop reason: HOSPADM

## 2021-03-01 RX ORDER — DONEPEZIL HYDROCHLORIDE 5 MG/1
10 TABLET, FILM COATED ORAL NIGHTLY
Status: DISCONTINUED | OUTPATIENT
Start: 2021-03-01 | End: 2021-03-04 | Stop reason: HOSPADM

## 2021-03-01 RX ORDER — OXYCODONE HYDROCHLORIDE 5 MG/1
10 TABLET ORAL EVERY 4 HOURS PRN
Status: DISCONTINUED | OUTPATIENT
Start: 2021-03-01 | End: 2021-03-04 | Stop reason: HOSPADM

## 2021-03-01 RX ORDER — MORPHINE SULFATE 4 MG/ML
2 INJECTION, SOLUTION INTRAMUSCULAR; INTRAVENOUS
Status: DISCONTINUED | OUTPATIENT
Start: 2021-03-01 | End: 2021-03-04 | Stop reason: HOSPADM

## 2021-03-01 RX ORDER — ASPIRIN 81 MG/1
81 TABLET ORAL DAILY
Status: DISCONTINUED | OUTPATIENT
Start: 2021-03-01 | End: 2021-03-04 | Stop reason: HOSPADM

## 2021-03-01 RX ORDER — POLYETHYLENE GLYCOL 3350 17 G/17G
17 POWDER, FOR SOLUTION ORAL DAILY PRN
Status: DISCONTINUED | OUTPATIENT
Start: 2021-03-01 | End: 2021-03-01 | Stop reason: SDUPTHER

## 2021-03-01 RX ORDER — PROMETHAZINE HYDROCHLORIDE 12.5 MG/1
12.5 TABLET ORAL EVERY 6 HOURS PRN
Status: DISCONTINUED | OUTPATIENT
Start: 2021-03-01 | End: 2021-03-04 | Stop reason: HOSPADM

## 2021-03-01 RX ORDER — SODIUM CHLORIDE 0.9 % (FLUSH) 0.9 %
10 SYRINGE (ML) INJECTION EVERY 12 HOURS SCHEDULED
Status: DISCONTINUED | OUTPATIENT
Start: 2021-03-01 | End: 2021-03-04 | Stop reason: HOSPADM

## 2021-03-01 RX ORDER — ONDANSETRON 2 MG/ML
INJECTION INTRAMUSCULAR; INTRAVENOUS PRN
Status: DISCONTINUED | OUTPATIENT
Start: 2021-03-01 | End: 2021-03-01 | Stop reason: SDUPTHER

## 2021-03-01 RX ORDER — MORPHINE SULFATE 4 MG/ML
4 INJECTION, SOLUTION INTRAMUSCULAR; INTRAVENOUS
Status: DISCONTINUED | OUTPATIENT
Start: 2021-03-01 | End: 2021-03-04 | Stop reason: HOSPADM

## 2021-03-01 RX ORDER — SENNA AND DOCUSATE SODIUM 50; 8.6 MG/1; MG/1
1 TABLET, FILM COATED ORAL 2 TIMES DAILY
Status: DISCONTINUED | OUTPATIENT
Start: 2021-03-01 | End: 2021-03-01 | Stop reason: SDUPTHER

## 2021-03-01 RX ORDER — SODIUM CHLORIDE 0.9 % (FLUSH) 0.9 %
10 SYRINGE (ML) INJECTION PRN
Status: DISCONTINUED | OUTPATIENT
Start: 2021-03-01 | End: 2021-03-04 | Stop reason: HOSPADM

## 2021-03-01 RX ORDER — ONDANSETRON 2 MG/ML
4 INJECTION INTRAMUSCULAR; INTRAVENOUS EVERY 6 HOURS PRN
Status: DISCONTINUED | OUTPATIENT
Start: 2021-03-01 | End: 2021-03-04 | Stop reason: HOSPADM

## 2021-03-01 RX ORDER — LIDOCAINE HYDROCHLORIDE 10 MG/ML
INJECTION, SOLUTION EPIDURAL; INFILTRATION; INTRACAUDAL; PERINEURAL PRN
Status: DISCONTINUED | OUTPATIENT
Start: 2021-03-01 | End: 2021-03-01 | Stop reason: SDUPTHER

## 2021-03-01 RX ORDER — OXYCODONE HYDROCHLORIDE 5 MG/1
5 TABLET ORAL EVERY 4 HOURS PRN
Status: DISCONTINUED | OUTPATIENT
Start: 2021-03-01 | End: 2021-03-04 | Stop reason: HOSPADM

## 2021-03-01 RX ORDER — DEXAMETHASONE SODIUM PHOSPHATE 4 MG/ML
4 INJECTION, SOLUTION INTRA-ARTICULAR; INTRALESIONAL; INTRAMUSCULAR; INTRAVENOUS; SOFT TISSUE ONCE
Status: DISCONTINUED | OUTPATIENT
Start: 2021-03-01 | End: 2021-03-01 | Stop reason: HOSPADM

## 2021-03-01 RX ORDER — FENTANYL CITRATE 50 UG/ML
INJECTION, SOLUTION INTRAMUSCULAR; INTRAVENOUS PRN
Status: DISCONTINUED | OUTPATIENT
Start: 2021-03-01 | End: 2021-03-01 | Stop reason: SDUPTHER

## 2021-03-01 RX ORDER — LISINOPRIL 20 MG/1
20 TABLET ORAL DAILY
Status: DISCONTINUED | OUTPATIENT
Start: 2021-03-01 | End: 2021-03-04 | Stop reason: HOSPADM

## 2021-03-01 RX ORDER — CEFAZOLIN SODIUM 1 G/3ML
INJECTION, POWDER, FOR SOLUTION INTRAMUSCULAR; INTRAVENOUS PRN
Status: DISCONTINUED | OUTPATIENT
Start: 2021-03-01 | End: 2021-03-01 | Stop reason: SDUPTHER

## 2021-03-01 RX ORDER — ACETAMINOPHEN 325 MG/1
650 TABLET ORAL EVERY 4 HOURS PRN
Status: DISCONTINUED | OUTPATIENT
Start: 2021-03-01 | End: 2021-03-04 | Stop reason: HOSPADM

## 2021-03-01 RX ORDER — SENNA AND DOCUSATE SODIUM 50; 8.6 MG/1; MG/1
1 TABLET, FILM COATED ORAL 2 TIMES DAILY
Status: DISCONTINUED | OUTPATIENT
Start: 2021-03-01 | End: 2021-03-04 | Stop reason: HOSPADM

## 2021-03-01 RX ORDER — AMLODIPINE BESYLATE 5 MG/1
5 TABLET ORAL DAILY
Status: DISCONTINUED | OUTPATIENT
Start: 2021-03-01 | End: 2021-03-04 | Stop reason: HOSPADM

## 2021-03-01 RX ORDER — SODIUM CHLORIDE, SODIUM LACTATE, POTASSIUM CHLORIDE, CALCIUM CHLORIDE 600; 310; 30; 20 MG/100ML; MG/100ML; MG/100ML; MG/100ML
1000 INJECTION, SOLUTION INTRAVENOUS ONCE
Status: DISCONTINUED | OUTPATIENT
Start: 2021-03-01 | End: 2021-03-04 | Stop reason: HOSPADM

## 2021-03-01 RX ORDER — SODIUM CHLORIDE, SODIUM LACTATE, POTASSIUM CHLORIDE, CALCIUM CHLORIDE 600; 310; 30; 20 MG/100ML; MG/100ML; MG/100ML; MG/100ML
INJECTION, SOLUTION INTRAVENOUS CONTINUOUS
Status: DISCONTINUED | OUTPATIENT
Start: 2021-03-01 | End: 2021-03-04 | Stop reason: HOSPADM

## 2021-03-01 RX ORDER — FENTANYL CITRATE 50 UG/ML
50 INJECTION, SOLUTION INTRAMUSCULAR; INTRAVENOUS EVERY 5 MIN PRN
Status: DISCONTINUED | OUTPATIENT
Start: 2021-03-01 | End: 2021-03-01 | Stop reason: HOSPADM

## 2021-03-01 RX ORDER — SODIUM CHLORIDE 0.9 % (FLUSH) 0.9 %
10 SYRINGE (ML) INJECTION EVERY 12 HOURS SCHEDULED
Status: DISCONTINUED | OUTPATIENT
Start: 2021-03-01 | End: 2021-03-01 | Stop reason: HOSPADM

## 2021-03-01 RX ORDER — SODIUM CHLORIDE 0.9 % (FLUSH) 0.9 %
10 SYRINGE (ML) INJECTION PRN
Status: DISCONTINUED | OUTPATIENT
Start: 2021-03-01 | End: 2021-03-01 | Stop reason: HOSPADM

## 2021-03-01 RX ORDER — POLYETHYLENE GLYCOL 3350 17 G/17G
17 POWDER, FOR SOLUTION ORAL DAILY PRN
Status: DISCONTINUED | OUTPATIENT
Start: 2021-03-01 | End: 2021-03-04 | Stop reason: HOSPADM

## 2021-03-01 RX ORDER — ESCITALOPRAM OXALATE 10 MG/1
10 TABLET ORAL DAILY
Status: DISCONTINUED | OUTPATIENT
Start: 2021-03-01 | End: 2021-03-04 | Stop reason: HOSPADM

## 2021-03-01 RX ORDER — PROPOFOL 10 MG/ML
INJECTION, EMULSION INTRAVENOUS PRN
Status: DISCONTINUED | OUTPATIENT
Start: 2021-03-01 | End: 2021-03-01 | Stop reason: SDUPTHER

## 2021-03-01 RX ORDER — HYDROMORPHONE HYDROCHLORIDE 1 MG/ML
0.5 INJECTION, SOLUTION INTRAMUSCULAR; INTRAVENOUS; SUBCUTANEOUS EVERY 5 MIN PRN
Status: DISCONTINUED | OUTPATIENT
Start: 2021-03-01 | End: 2021-03-01 | Stop reason: HOSPADM

## 2021-03-01 RX ORDER — HYDROMORPHONE HYDROCHLORIDE 1 MG/ML
0.25 INJECTION, SOLUTION INTRAMUSCULAR; INTRAVENOUS; SUBCUTANEOUS EVERY 5 MIN PRN
Status: DISCONTINUED | OUTPATIENT
Start: 2021-03-01 | End: 2021-03-01 | Stop reason: HOSPADM

## 2021-03-01 RX ORDER — ROCURONIUM BROMIDE 10 MG/ML
INJECTION, SOLUTION INTRAVENOUS PRN
Status: DISCONTINUED | OUTPATIENT
Start: 2021-03-01 | End: 2021-03-01 | Stop reason: SDUPTHER

## 2021-03-01 RX ADMIN — SODIUM CHLORIDE, SODIUM LACTATE, POTASSIUM CHLORIDE, AND CALCIUM CHLORIDE: 600; 310; 30; 20 INJECTION, SOLUTION INTRAVENOUS at 17:54

## 2021-03-01 RX ADMIN — PHENYLEPHRINE HYDROCHLORIDE 100 MCG: 10 INJECTION INTRAVENOUS at 18:23

## 2021-03-01 RX ADMIN — PHENYLEPHRINE HYDROCHLORIDE 200 MCG: 10 INJECTION INTRAVENOUS at 18:34

## 2021-03-01 RX ADMIN — CEFAZOLIN SODIUM 2000 MG: 1 INJECTION, POWDER, FOR SOLUTION INTRAMUSCULAR; INTRAVENOUS at 18:37

## 2021-03-01 RX ADMIN — PHENYLEPHRINE HYDROCHLORIDE 200 MCG: 10 INJECTION INTRAVENOUS at 18:39

## 2021-03-01 RX ADMIN — ONDANSETRON HYDROCHLORIDE 4 MG: 2 INJECTION, SOLUTION INTRAMUSCULAR; INTRAVENOUS at 19:12

## 2021-03-01 RX ADMIN — SUGAMMADEX 150 MG: 100 INJECTION, SOLUTION INTRAVENOUS at 19:11

## 2021-03-01 RX ADMIN — PHENYLEPHRINE HYDROCHLORIDE 200 MCG: 10 INJECTION INTRAVENOUS at 18:25

## 2021-03-01 RX ADMIN — ROCURONIUM BROMIDE 50 MG: 10 INJECTION, SOLUTION INTRAVENOUS at 18:23

## 2021-03-01 RX ADMIN — PROPOFOL 80 MG: 10 INJECTION, EMULSION INTRAVENOUS at 18:23

## 2021-03-01 RX ADMIN — PHENYLEPHRINE HYDROCHLORIDE 200 MCG: 10 INJECTION INTRAVENOUS at 18:30

## 2021-03-01 RX ADMIN — FENTANYL CITRATE 100 MCG: 50 INJECTION, SOLUTION INTRAMUSCULAR; INTRAVENOUS at 18:23

## 2021-03-01 RX ADMIN — LIDOCAINE HYDROCHLORIDE 50 MG: 10 INJECTION, SOLUTION EPIDURAL; INFILTRATION; INTRACAUDAL; PERINEURAL at 18:23

## 2021-03-01 ASSESSMENT — ENCOUNTER SYMPTOMS
CONSTIPATION: 0
BLOOD IN STOOL: 0
NAUSEA: 0
EYE ITCHING: 0
ABDOMINAL DISTENTION: 0
STRIDOR: 0
TROUBLE SWALLOWING: 0
SHORTNESS OF BREATH: 0
SORE THROAT: 0
WHEEZING: 0
DIARRHEA: 0
COUGH: 0
ABDOMINAL PAIN: 0
COLOR CHANGE: 0
VOMITING: 0
EYE DISCHARGE: 0
CHOKING: 0

## 2021-03-01 ASSESSMENT — PAIN SCALES - GENERAL: PAINLEVEL_OUTOF10: 8

## 2021-03-01 ASSESSMENT — LIFESTYLE VARIABLES: SMOKING_STATUS: 0

## 2021-03-01 NOTE — ED PROVIDER NOTES
[facility] EMERGENCY DEPT  EMERGENCY DEPARTMENT ENCOUNTER      Pt Name: Davis Sierra  MRN: [id number]  [place] 11/13/1928  Date of evaluation: 3/1/2021  Provider: Cherie Painter MD    [address] [place] [address]       Chief Complaint   Patient presents with    Hip Pain     arrived via EMS from home with left hip pain after fall 2/28         HISTORY OF PRESENT ILLNESS   (Location/Symptom, Timing/Onset,Context/Setting, Quality, Duration, Modifying Factors, Severity)  Note limiting factors. Davis Sierra is a 80 y.o. female who presents to the emergency department for evaluation after a fall yesterday according to daughter. Patient has a history of dementia and is unable to provide any additional history. States she does not know why she is here and just wants to know why her daughter is. When asked if she remembers falling, patient says \"I suppose I probably did. \"    HPI    NursingNotes were reviewed. REVIEW OF SYSTEMS    (2-9 systems for level 4, 10 or more for level 5)     Review of Systems   Unable to perform ROS: Dementia       A complete review of systems was performed and is negative except as noted above in the HPI.        PAST MEDICAL HISTORY     Past Medical History:   Diagnosis Date    Anxiety     Depression     Hx of blood clots     Hypertension          SURGICAL HISTORY       Past Surgical History:   Procedure Laterality Date    APPENDECTOMY      ARM SURGERY      HYSTERECTOMY, TOTAL ABDOMINAL      LEG SURGERY           CURRENT MEDICATIONS       Previous Medications    AMLODIPINE (NORVASC) 5 MG TABLET    TAKE 1 TABLET DAILY    ASPIRIN EC 81 MG EC TABLET    Take 1 tablet by mouth daily    CELECOXIB (CELEBREX) 200 MG CAPSULE    Take 1 capsule by mouth daily    DICLOFENAC SODIUM (VOLTAREN) 1 % GEL    Apply 2 g topically 2 times daily    DONEPEZIL (ARICEPT) 10 MG TABLET    TAKE 1 TABLET NIGHTLY    ESCITALOPRAM (LEXAPRO) 10 MG TABLET    TAKE 1 TABLET DAILY    FUROSEMIDE (LASIX) 40 MG TABLET    Take 1 tablet by mouth 2 times daily    LISINOPRIL (PRINIVIL;ZESTRIL) 20 MG TABLET    TAKE 1 TABLET DAILY    LORAZEPAM (ATIVAN) 1 MG TABLET    TAKE 1 TABLET AT BEDTIME   AND YOU CAN TAKE AN EXTRA  1/2 TABLET DURING THE DAY  IF NEEDED       ALLERGIES     Patient has no known allergies. FAMILY HISTORY       Family History   Family history unknown: Yes          SOCIAL HISTORY       Social History     Socioeconomic History    Marital status:      Spouse name: None    Number of children: None    Years of education: None    Highest education level: None   Occupational History    None   Social Needs    Financial resource strain: None    Food insecurity     Worry: None     Inability: None    Transportation needs     Medical: None     Non-medical: None   Tobacco Use    Smoking status: Never Smoker    Smokeless tobacco: Never Used   Substance and Sexual Activity    Alcohol use: Never     Frequency: Never    Drug use: Never    Sexual activity: None   Lifestyle    Physical activity     Days per week: None     Minutes per session: None    Stress: None   Relationships    Social connections     Talks on phone: None     Gets together: None     Attends Nondenominational service: None     Active member of club or organization: None     Attends meetings of clubs or organizations: None     Relationship status: None    Intimate partner violence     Fear of current or ex partner: None     Emotionally abused: None     Physically abused: None     Forced sexual activity: None   Other Topics Concern    None   Social History Narrative    None       SCREENINGS             PHYSICAL EXAM    (up to 7 for level 4, 8 or more for level 5)     ED Triage Vitals [03/01/21 1343]   BP Temp Temp src Pulse Resp SpO2 Height Weight   -- 97.1 °F (36.2 °C) -- 88 16 94 % 5' (1.524 m) 115 lb (52.2 kg)       Physical Exam  Vitals signs and nursing note reviewed. Constitutional:       General: She is not in acute distress. Appearance: She is well-developed. She is not toxic-appearing or diaphoretic. HENT:      Head: Normocephalic and atraumatic. Eyes:      General: No scleral icterus. Right eye: No discharge. Left eye: No discharge. Pupils: Pupils are equal, round, and reactive to light. Neck:      Musculoskeletal: Normal range of motion. Cardiovascular:      Rate and Rhythm: Normal rate and regular rhythm. Pulmonary:      Effort: Pulmonary effort is normal. No respiratory distress. Breath sounds: No stridor. Abdominal:      General: There is no distension. Musculoskeletal: Normal range of motion. General: No deformity. Right shoulder: She exhibits tenderness. Right hip: She exhibits tenderness. Comments: Right leg is shortened and externally rotated. There is tenderness to palpation over the greater trochanter of the right hip    Bruising of the right shoulder   Skin:     General: Skin is warm and dry. Neurological:      Mental Status: She is alert. She is confused. GCS: GCS eye subscore is 4. GCS verbal subscore is 4. GCS motor subscore is 6. Cranial Nerves: No cranial nerve deficit. Motor: No abnormal muscle tone. Psychiatric:         Behavior: Behavior normal.         Thought Content: Thought content normal.         Judgment: Judgment normal.         DIAGNOSTIC RESULTS     EKG: All EKG's are interpreted by the Emergency Department Physician who either signs or Co-signs this chart in the absence of a cardiologist.  Sinus rhythm with a rate of 73.   Inferior lateral Q waves present but no findings of acute ischemia or infarction    RADIOLOGY:   Non-plain film images such as CT, Ultrasound and MRI are read by the radiologist. Shahana Litter images are visualized and preliminarily interpreted by the emergency physician with the below findings:        Interpretation per the Radiologist below, if available at the time of this note:    XR HIP 2-3 VW W PELVIS RIGHT   Final Result Impression:   Acute mildly displaced varus angulated RIGHT femoral intertrochanteric   fracture. Signed by Dr Heide Tamayo on 3/1/2021 3:16 PM      XR HUMERUS RIGHT (MIN 2 VIEWS)   Final Result   Impression:   Comminuted displaced RIGHT humeral surgical neck fracture. Signed by Dr Heide Tamayo on 3/1/2021 3:13 PM      XR CHEST PORTABLE   Final Result   Impression:   1. No acute cardiopulmonary finding. 2.  Partially imaged RIGHT humeral neck fracture. Signed by Dr Heide Tamayo on 3/1/2021 3:10 PM      CT Cervical Spine WO Contrast   Final Result   Impression:   1. No acute fracture or subluxation. 2.  Chronic T2 compression deformity. Signed by Dr Heide Tamayo on 3/1/2021 3:09 PM      CT Head WO Contrast   Final Result   Impression:   1. No acute intracranial findings. 2.  Chronic microvascular ischemic white matter change.    Signed by Dr Heide Tamayo on 3/1/2021 3:02 PM            ED BEDSIDE ULTRASOUND:   Performed by ED Physician - none    LABS:  Labs Reviewed   CBC WITH AUTO DIFFERENTIAL - Abnormal; Notable for the following components:       Result Value    WBC 19.8 (*)     RBC 2.95 (*)     Hemoglobin 9.2 (*)     Hematocrit 28.3 (*)     MCH 31.2 (*)     MCHC 32.5 (*)     Neutrophils % 83.6 (*)     Lymphocytes % 7.3 (*)     Neutrophils Absolute 16.6 (*)     Monocytes Absolute 1.50 (*)     All other components within normal limits   COMPREHENSIVE METABOLIC PANEL W/ REFLEX TO MG FOR LOW K - Abnormal; Notable for the following components:    Potassium reflex Magnesium 5.3 (*)     Glucose 140 (*)     BUN 40 (*)     CREATININE 2.9 (*)     GFR Non- 15 (*)     GFR  18 (*)     Total Protein 5.9 (*)     Albumin 3.2 (*)     All other components within normal limits   PROTIME-INR - Abnormal; Notable for the following components:    Protime 14.9 (*)     All other components within normal limits   COVID-19, RAPID   APTT   IRON AND TIBC   FERRITIN   URINE RT REFLEX TO CULTURE   TYPE AND SCREEN       All other labs were within normal range or not returned as of this dictation. Medications   lactated ringers infusion 1,000 mL (has no administration in time range)       EMERGENCY DEPARTMENT COURSE and DIFFERENTIALDIAGNOSIS/MDM:   Vitals:    Vitals:    03/01/21 1343   Pulse: 88   Resp: 16   Temp: 97.1 °F (36.2 °C)   SpO2: 94%   Weight: 115 lb (52.2 kg)   Height: 5' (1.524 m)       MDM    ED Course as of Mar 01 1730   Mon Mar 01, 2021   1521 Discussed with orthopedic surgery. Plan for operative intervention on the hip tonight and nonoperative management of the right proximal humerus fracture. [SOLIS]   9104 Labs do show acute renal insufficiency as well as elevated white blood cell count and anemia. Iron and TIBC along with ferritin ordered. Patient given IV fluids here. Urinalysis sent. Otherwise no signs of underlying infection. [SOLIS]      ED Course User Index  [SOLIS] Bob Toney MD     Based on the evaluation and work-up here patient is felt to require further monitoring, work-up, or treatment that is available in the emergency department. Case was discussed with hospitalist who agrees for observation or admission for further management. Treatment and stabilization as necessary were provided in the emergency department prior to transfer of care to the medicine service. CONSULTS:  IP CONSULT TO ORTHOPEDIC SURGERY    PROCEDURES:  Unless otherwise notedbelow, none     Procedures      FINAL IMPRESSION     1. Fall from standing, initial encounter    2. Displaced intertrochanteric fracture of right femur, init (HCC)    3. Closed 3-part fracture of proximal end of right humerus, initial encounter    4. Dementia without behavioral disturbance, unspecified dementia type ([facility] [address]    5. Acute renal insufficiency    6.  Anemia, unspecified type          DISPOSITION/PLAN   DISPOSITION Admitted 03/01/2021 03:32:55 PM      PATIENT REFERRED TO:  No follow-up provider specified.     DISCHARGE MEDICATIONS:  New Prescriptions    No medications on file          (Please note that portions of this note were completed with a voice recognition program.  Efforts were made to edit the dictations butoccasionally words are mis-transcribed.)    Nena Stoll MD (electronically signed)  AttendingEmergency Physician          Nena Stoll., MD  03/01/21 0123

## 2021-03-01 NOTE — H&P
Hospital Medicine  History and Physical    Patient:  Michelle Georges  MRN: [id number]    CHIEF COMPLAINT:  Right hip pain    History Obtained From: daughter, patient, chart    PCP: GEOFF Suárez    HISTORY OF PRESENT ILLNESS:  80year old white female directed to the emergency department by her daughter with complaint of continued right hip pain after fall yesterday morning. She was using the bathroom around 0400 and attempted to rise without pulling up her pants, lost her balance and fell. No loss of consciousness. She landed on her right hip and had severe pain. EMS was summoned and was able to get her up and into bed. She felt like nothing was fractured and refused to come to the hospital for evaluation. She reportedly slept through the day yesterday. This morning, when her pain was unimproved, she had a virtual visit with her PCP, who directed her to the ED for imaging. Fractures of the right femoral neck and humeral neck were seen. Orthopedic surgery was contacted and Dr. Hai Kemp is planning surgery for the hip tonight. She had only a small bite to eat this morning. REVIEW OF SYSTEMS:  14 systems reviewed and negative except as noted above. Past Medical History:      Diagnosis Date    Anxiety     Depression     Hx of blood clots     Hypertension        Past Surgical History:      Procedure Laterality Date    APPENDECTOMY      ARM SURGERY      HYSTERECTOMY, TOTAL ABDOMINAL      LEG SURGERY         Medications Prior to Admission:    Prior to Admission medications    Medication Sig Start Date End Date Taking?  Authorizing Provider   LORazepam (ATIVAN) 1 MG tablet TAKE 1 TABLET AT BEDTIME   AND YOU CAN TAKE AN EXTRA  1/2 TABLET DURING THE DAY  IF NEEDED 2/22/21 3/24/21  GEOFF Suárez   lisinopril (PRINIVIL;ZESTRIL) 20 MG tablet TAKE 1 TABLET DAILY 2/22/21   GEOFF Suárez   escitalopram (LEXAPRO) 10 MG tablet TAKE 1 TABLET DAILY 2/22/21   GEOFF Suárez   donepezil (ARICEPT) 10 MG tablet TAKE 1 TABLET NIGHTLY 2/22/21   GEOFF Scanlon   amLODIPine (NORVASC) 5 MG tablet TAKE 1 TABLET DAILY 2/17/21   GEOFF cSanlon   celecoxib (CELEBREX) 200 MG capsule Take 1 capsule by mouth daily 9/29/20   GEOFF Scanlon   furosemide (LASIX) 40 MG tablet Take 1 tablet by mouth 2 times daily 8/31/20 12/9/20  GEOFF Scanlon   diclofenac sodium (VOLTAREN) 1 % GEL Apply 2 g topically 2 times daily 8/13/20   Patricia Lacey MD   aspirin EC 81 MG EC tablet Take 1 tablet by mouth daily 7/21/20   GEOFF Scanlon       Allergies:  Patient has no known allergies. Social History:   TOBACCO:   reports that she has never smoked. She has never used smokeless tobacco.  ETOH:   reports no history of alcohol use. Family History:       Family history unknown: Yes           Physical Exam:    Vitals: Pulse 88   Temp 97.1 °F (36.2 °C)   Resp 16   Ht 5' (1.524 m)   Wt 115 lb (52.2 kg)   SpO2 94%   BMI 22.46 kg/m²   24HR INTAKE/OUTPUT:  No intake or output data in the 24 hours ending 03/01/21 1615  General appearance: alert and cooperative with exam  HEENT: atraumatic, eyes with clear conjunctiva and normal lids, pupils and irises normal, external ears and nose are normal, lips normal. Neck without masses, lympadenopathy, bruit, thyroid normal  Lungs: no increased work of breathing, clear to auscultation bilaterally without rales, rhonchi or wheezes  Heart: regular rate and rhythm, S1, S2 normal, no murmur, click, rub or gallop  Abdomen: soft, non-tender; bowel sounds normal; no masses,  no organomegaly  Extremities: localized edema right lateral hip  Neurologic: No focal neurologic deficits, normal sensation, alert and oriented, affect and mood appropriate. Skin: bruising diffusely, especially in the right shoulder and axilla    Labs pending:  CBC: No results for input(s): WBC, HGB, PLT in the last 72 hours.   BMP:  No results for input(s): NA, K, CL, CO2, BUN, CREATININE, GLUCOSE in the last 72 hours. Hepatic: No results for input(s): AST, ALT, ALB, BILITOT, ALKPHOS in the last 72 hours. Troponin T: No results for input(s): TROPONINI in the last 72 hours. ABGs: No results for input(s): PH, PCO2, PO2, HCO3, BE, O2SAT in the last 72 hours. INR: No results for input(s): INR in the last 72 hours. Lactic Acid: No results for input(s): LACTA in the last 72 hours. 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 *      Admit to med/surg. Orthopedic surgery consulted from ED. To OR tonight for right hip fracture. Shoulder fracture to be treated with conservative management including sling per orthopedic surgery via ED provider. Orthostatics, ECG but fall appears to be mechanical. She has had multiple mechanical falls causing fractures. Daughter says she was told that she doesn't have osteoporosis. Will certainly require rehab following surgery. Home medications reconciled.     Jackie Police, DO  Admitting Hospitalist

## 2021-03-01 NOTE — PROGRESS NOTES
[address] [facility]  [address] [place] [address]  [address] [address]  Dept: [phone]  Dept Fax: [phone]: [phone]    Shana Ho (:  1928) is a 80 y.o. female,Established patient, here for evaluation of the following chief complaint(s): Fall      Shana Ho is a 80 y.o. female who were are performing tele health communication  for her medical conditions/complaints as noted below. Currently, our state is under umbrella of national state of emergency and we are using alternative methods of taking care of the needs of our patients so they are not exposed in our office; The patient has consented to this form of communication  Shana Ho is c/[place]   Fall    THE patient is at home  Love Markham is at office   Others in attendance are her daughter;     HPI:     HPI   3. She fell yesterday ; she called ambulance and they got her up told them he did not think anything is broken; Her daughter says her right leg is what she landed on and she wont move that on her own; She yells when her daughter picks up her leg; She ways she knows nothing is broken and the EMTs told her nothing was broken; She is wanting home health for someone to help her come turn her value with her dresser. I did inform her that that is really not the services of home health they can provide some assistance and they can see if her policy helps to have 8 in the home but most likely that we will have to be a personal expense for her to get someone to help her in the home. Did explain to her daughter that there is no way we will know for sure if she has a fracture or not unless she takes her to the hospital to have x-rays done. She could have broken hip or she could have a broken pelvis and still be able to move it. Given the fact that she is screaming and yelling when the daughter tries to move it I emphasized most likely she has a broken hip or broken pelvis but if this proves to be not we can proceed with home health.   Chief Complaint   Patient presents with   Johanna Briggs       Past Medical History:   Diagnosis Date    Anxiety     Depression     Hx of blood clots     Hypertension       Past Surgical History:   Procedure Laterality Date    APPENDECTOMY      ARM SURGERY      HYSTERECTOMY, TOTAL ABDOMINAL      LEG SURGERY         Vitals 9/28/2020 8/31/2020 8/13/2020 8/13/2020 8/13/2020 9/66/4864   SYSTOLIC 314 108 435 - 547 817   DIASTOLIC 68 65 67 - 74 78   Pulse 88 68 78 - 80 79   Temp - - 97.7 - 96.8 96.8   Resp - - 20 - 20 20   SpO2 - - 93 - 94 94   Weight - - - - - -   Height - - - - - -   Body mass index - - - - - -   Pain Level - - - 7 - -   Some recent data might be hidden       Family History   Family history unknown: Yes       Social History     Tobacco Use    Smoking status: Never Smoker    Smokeless tobacco: Never Used   Substance Use Topics    Alcohol use: Never     Frequency: Never      Current Outpatient Medications   Medication Sig Dispense Refill    LORazepam (ATIVAN) 1 MG tablet TAKE 1 TABLET AT BEDTIME   AND YOU CAN TAKE AN EXTRA  1/2 TABLET DURING THE DAY  IF NEEDED 45 tablet 0    lisinopril (PRINIVIL;ZESTRIL) 20 MG tablet TAKE 1 TABLET DAILY 90 tablet 1    escitalopram (LEXAPRO) 10 MG tablet TAKE 1 TABLET DAILY 90 tablet 1    donepezil (ARICEPT) 10 MG tablet TAKE 1 TABLET NIGHTLY 90 tablet 1    amLODIPine (NORVASC) 5 MG tablet TAKE 1 TABLET DAILY 90 tablet 1    celecoxib (CELEBREX) 200 MG capsule Take 1 capsule by mouth daily 60 capsule 3  furosemide (LASIX) 40 MG tablet Take 1 tablet by mouth 2 times daily 180 tablet 1    diclofenac sodium (VOLTAREN) 1 % GEL Apply 2 g topically 2 times daily 1 Tube 3    aspirin EC 81 MG EC tablet Take 1 tablet by mouth daily 90 tablet 1     No current facility-administered medications for this visit.       No Known Allergies    Health Maintenance   Topic Date Due    COVID-19 Vaccine (1 of 2) 11/13/1944    DTaP/Tdap/Td vaccine (1 - Tdap) 11/13/1947    Shingles Vaccine (1 of 2) 07/01/2021 (Originally 11/13/1978)    Flu vaccine (1) 12/09/2021 (Originally 9/1/2020)    Pneumococcal 65+ years Vaccine (1 of 1 - PPSV23) 07/15/2030 (Originally 11/13/1993)    Potassium monitoring  08/12/2021    Creatinine monitoring  08/12/2021    Annual Wellness Visit (AWV)  12/10/2021    Hepatitis A vaccine  Aged Out    Hepatitis B vaccine  Aged Out    Hib vaccine  Aged Out    Meningococcal (ACWY) vaccine  Aged Out       No results found for: LABA1C  No results found for: PSA, PSADIA  TSH   Date Value Ref Range Status   07/01/2020 2.920 0.270 - 4.200 uIU/mL Final   ]  Lab Results   Component Value Date     08/12/2020    K 3.9 08/12/2020     08/12/2020    CO2 24 08/12/2020    BUN 15 08/12/2020    CREATININE 0.5 08/12/2020    GLUCOSE 101 08/12/2020    CALCIUM 9.0 08/12/2020    PROT 6.3 (L) 08/12/2020    LABALBU 3.4 (L) 08/12/2020    BILITOT 0.9 08/12/2020    ALKPHOS 95 08/12/2020    AST 17 08/12/2020    ALT 11 08/12/2020    LABGLOM >60 08/12/2020    GFRAA >59 08/12/2020     Lab Results   Component Value Date    CHOL 177 07/01/2020     Lab Results   Component Value Date    TRIG 90 07/01/2020     Lab Results   Component Value Date    HDL 59 (L) 07/01/2020     Lab Results   Component Value Date    LDLCALC 100 07/01/2020     Lab Results   Component Value Date     08/12/2020    K 3.9 08/12/2020    K 4.1 08/07/2020     08/12/2020    CO2 24 08/12/2020    BUN 15 08/12/2020    CREATININE 0.5 08/12/2020 GLUCOSE 101 08/12/2020    CALCIUM 9.0 08/12/2020      Lab Results   Component Value Date    WBC 8.3 08/12/2020    HGB 12.5 08/12/2020    HCT 39.2 08/12/2020    MCV 94.2 08/12/2020     08/12/2020    LYMPHOPCT 7.3 (L) 08/07/2020    RBC 4.16 (L) 08/12/2020    MCH 30.0 08/12/2020    MCHC 31.9 (L) 08/12/2020    RDW 12.7 08/12/2020     Lab Results   Component Value Date    VITD25 31.6 07/01/2020       Subjective:      Review of Systems   Constitutional: Negative for fatigue, fever and unexpected weight change. HENT: Negative for ear discharge, ear pain, mouth sores, sore throat and trouble swallowing. Eyes: Negative for discharge, itching and visual disturbance. Respiratory: Negative for cough, choking, shortness of breath, wheezing and stridor. Cardiovascular: Negative for chest pain, palpitations and leg swelling. Gastrointestinal: Negative for abdominal distention, abdominal pain, blood in stool, constipation, diarrhea, nausea and vomiting. Endocrine: Negative for cold intolerance, polydipsia and polyuria. Genitourinary: Negative for difficulty urinating, dysuria, frequency and urgency. Musculoskeletal: Negative for arthralgias and gait problem. Hip pain     Skin: Negative for color change and rash. Allergic/Immunologic: Negative for food allergies and immunocompromised state. Neurological: Negative for dizziness, tremors, syncope, speech difficulty, weakness and headaches. Dementia  She requires her daughter's help with all ADLs. Hematological: Negative for adenopathy. Does not bruise/bleed easily. Psychiatric/Behavioral: Negative for confusion and hallucinations. Objective:     Physical Exam     Psychiatric  mood, affect, and behavior appear normal.  She has confusion  Musculoskeletal she has pain with any movement of her right leg per her daughter  There were no vitals taken for this visit. Assessment:       Diagnosis Orders   1.  Fall, sequela 2. Hip pain, acute, right         Plan:        Patient given educational materials - see patient instructions. Discussed use, benefit, and side effects of prescribed medications. Allpatient questions answered. Pt voiced understanding. Reviewed health maintenance. Instructed to continue current medications, diet and exercise. Patient agreed with treatment plan. Follow up as directed. MEDICATIONS:  No orders of the defined types were placed in this encounter. ORDERS:  No orders of the defined types were placed in this encounter. Follow-up:  Return for keep fu appt. Following the remote visit today we will call you when we are available to reschedule your follow up appt      PATIENT INSTRUCTIONS:  Patient Instructions   1. Fall  #2 right hip pain leg pain    With the daughter advised her she really needs to call an ambulance and have her taken to the hospital to ensure that she does not have a hip fracture or pelvic fracture and injured in any other way. If we find out that the x-rays are normal we can certainly get home health referral at that time. Electronically signed by GEOFF Cr on 3/1/2021 at 10:57 AM   tele visit 15 minutes  We are communicating with telehealth for the 3 month fu for controlled substance      Pursuant to the emergency declaration under the [address] [address] waiver authority and the [employer] and [employer] General Act, this Virtual Visit was conducted, with patient's consent, to reduce the patient's risk of exposure to COVID-19 and provide continuity of care for an established patient.        EMRDragon/transcription disclaimer:  Much of this encounter note is electronic Mandy Gutierrez is a 80 y.o. female being evaluated by a Virtual Visit (video visit) encounter to address concerns as mentioned above. A caregiver was present when appropriate. Due to this being a TeleHealth encounter (During [facility]-11 public health emergency), evaluation of the following organ systems was limited: Vitals/Constitutional/EENT/Resp/CV/GI//MS/Neuro/Skin/Heme-Lymph-Imm. Pursuant to the emergency declaration under the [address] [place] [address] and the [employer] and [employer] Act, this Virtual Visit was conducted with patient's (and/or legal guardian's) consent, to reduce the patient's risk of exposure to COVID-19 and provide necessary medical care. The patient (and/or legal guardian) has also been advised to contact this office for worsening conditions or problems, and seek emergency medical treatment and/or call 911 if deemed necessary.      Patient identification was verified at the start of the visit: Yes

## 2021-03-01 NOTE — LETTER
Thad Butler,  at [facility]  [phone] phone  [phone] fax  [phone] CELL (ERNIE [address] 106)    Thad Butler  at [place] [phone] phone  [phone] fax  [phone] CELL (ERNIE [address])

## 2021-03-01 NOTE — PATIENT INSTRUCTIONS
1.  Fall  #2 right hip pain leg pain    With the daughter advised her she really needs to call an ambulance and have her taken to the hospital to ensure that she does not have a hip fracture or pelvic fracture and injured in any other way. If we find out that the x-rays are normal we can certainly get home health referral at that time.

## 2021-03-01 NOTE — ANESTHESIA PRE PROCEDURE
Department of Anesthesiology  Preprocedure Note       Name:  Evie Vanessa   Age:  80 y.o.  :  1928                                          MRN:  [id number]         Date:  3/1/2021      Surgeon: Erin Dotson):  Maricarmen Church MD    Procedure: Procedure(s): FEMUR IM NAIL YAZAN INSERTION Short    Medications prior to admission:   Prior to Admission medications    Medication Sig Start Date End Date Taking?  Authorizing Provider   LORazepam (ATIVAN) 1 MG tablet TAKE 1 TABLET AT BEDTIME   AND YOU CAN TAKE AN EXTRA  1/2 TABLET DURING THE DAY  IF NEEDED 2/22/21 3/24/21  Gordy Post, APRN   lisinopril (PRINIVIL;ZESTRIL) 20 MG tablet TAKE 1 TABLET DAILY 21   Gordy Post, APRN   escitalopram (LEXAPRO) 10 MG tablet TAKE 1 TABLET DAILY 21   Gordy Post, APRN   donepezil (ARICEPT) 10 MG tablet TAKE 1 TABLET NIGHTLY 21   Gordy Post, APRN   amLODIPine (NORVASC) 5 MG tablet TAKE 1 TABLET DAILY 21   Gordy Post, APRN   celecoxib (CELEBREX) 200 MG capsule Take 1 capsule by mouth daily 20   Gordy Post, APRN   furosemide (LASIX) 40 MG tablet Take 1 tablet by mouth 2 times daily 20  Gordy Post, APRN   diclofenac sodium (VOLTAREN) 1 % GEL Apply 2 g topically 2 times daily 20   Isamar Harvey MD   aspirin EC 81 MG EC tablet Take 1 tablet by mouth daily 20   Gordy Post, APRN       Current medications:    Current Facility-Administered Medications   Medication Dose Route Frequency Provider Last Rate Last Admin    lactated ringers infusion 1,000 mL  1,000 mL Intravenous Once Camacho Ambrose MD         Current Outpatient Medications   Medication Sig Dispense Refill    LORazepam (ATIVAN) 1 MG tablet TAKE 1 TABLET AT BEDTIME   AND YOU CAN TAKE AN EXTRA  1/2 TABLET DURING THE DAY  IF NEEDED 45 tablet 0    lisinopril (PRINIVIL;ZESTRIL) 20 MG tablet TAKE 1 TABLET DAILY 90 tablet 1    escitalopram (LEXAPRO) 10 MG tablet TAKE 1 TABLET DAILY 90 tablet 1  donepezil (ARICEPT) 10 MG tablet TAKE 1 TABLET NIGHTLY 90 tablet 1    amLODIPine (NORVASC) 5 MG tablet TAKE 1 TABLET DAILY 90 tablet 1    celecoxib (CELEBREX) 200 MG capsule Take 1 capsule by mouth daily 60 capsule 3    furosemide (LASIX) 40 MG tablet Take 1 tablet by mouth 2 times daily 180 tablet 1    diclofenac sodium (VOLTAREN) 1 % GEL Apply 2 g topically 2 times daily 1 Tube 3    aspirin EC 81 MG EC tablet Take 1 tablet by mouth daily 90 tablet 1       Allergies:  No Known Allergies    Problem List:    Patient Active Problem List   Diagnosis Code    Anxiety F41.9    Essential hypertension I10    Episode of recurrent major depressive disorder ([facility] [address] F33.9    Vascular dementia ([facility] [address] F01.50    History of DVT (deep vein thrombosis) Z86.718    Current use of long term anticoagulation Z79.01    Fall W19. Savannah Contreras    Recurrent major depressive disorder, in partial remission ([facility] [address] F33.41    Fx humeral neck, right, closed, initial encounter S42.211A    Closed fracture of anatomical neck of humerus, right, initial encounter S42.291A       Past Medical History:        Diagnosis Date    Anxiety     Depression     Hx of blood clots     Hypertension        Past Surgical History:        Procedure Laterality Date    APPENDECTOMY      ARM SURGERY      HYSTERECTOMY, TOTAL ABDOMINAL      LEG SURGERY         Social History:    Social History     Tobacco Use    Smoking status: Never Smoker    Smokeless tobacco: Never Used   Substance Use Topics    Alcohol use: Never     Frequency: Never                                Counseling given: Not Answered      Vital Signs (Current):   Vitals:    03/01/21 1343   Pulse: 88   Resp: 16   Temp: 97.1 °F (36.2 °C)   SpO2: 94%   Weight: 115 lb (52.2 kg)   Height: 5' (1.524 m)                                              BP Readings from Last 3 Encounters:   09/28/20 (!) 148/68   08/31/20 105/65   08/13/20 132/67       NPO Status: BMI:   Wt Readings from Last 3 Encounters:   03/01/21 115 lb (52.2 kg)   08/07/20 125 lb (56.7 kg)   07/07/20 125 lb (56.7 kg)     Body mass index is 22.46 kg/m². CBC:   Lab Results   Component Value Date    WBC 19.8 03/01/2021    RBC 2.95 03/01/2021    HGB 9.2 03/01/2021    HCT 28.3 03/01/2021    MCV 95.9 03/01/2021    RDW 14.4 03/01/2021     03/01/2021       CMP:   Lab Results   Component Value Date     03/01/2021    K 5.3 03/01/2021    CL 99 03/01/2021    CO2 25 03/01/2021    BUN 40 03/01/2021    CREATININE 2.9 03/01/2021    GFRAA 18 03/01/2021    LABGLOM 15 03/01/2021    GLUCOSE 140 03/01/2021    PROT 5.9 03/01/2021    CALCIUM 8.6 03/01/2021    BILITOT 0.7 03/01/2021    ALKPHOS 80 03/01/2021    AST 19 03/01/2021    ALT 13 03/01/2021       POC Tests: No results for input(s): POCGLU, POCNA, POCK, POCCL, POCBUN, POCHEMO, POCHCT in the last 72 hours. Coags:   Lab Results   Component Value Date    PROTIME 14.9 03/01/2021    INR 1.17 03/01/2021    APTT 26.8 03/01/2021       HCG (If Applicable): No results found for: PREGTESTUR, PREGSERUM, HCG, HCGQUANT     ABGs: No results found for: PHART, PO2ART, KBB2KJX, TRA9YVX, BEART, X6VIUAFV     Type & Screen (If Applicable):  No results found for: LABABO, LABRH    Drug/Infectious Status (If Applicable):  No results found for: HIV, HEPCAB    COVID-19 Screening (If Applicable):   Lab Results   Component Value Date    COVID19 Not Detected 03/01/2021    COVID19 Not Detected 08/13/2020         Anesthesia Evaluation  Patient summary reviewed no history of anesthetic complications:   Airway: Mallampati: II  TM distance: >3 FB   Neck ROM: full  Mouth opening: > = 3 FB Dental:    (+) upper dentures, lower dentures and edentulous      Pulmonary:normal exam  breath sounds clear to auscultation      (-) asthma, recent URI, sleep apnea and not a current smoker          Patient did not smoke on day of surgery.

## 2021-03-01 NOTE — OP NOTE
adequately align. A sterile prep and drape was then performed. A guidepin was placed at the tip of the greater trochanter on the AP plane and in line with the intramedullary canal on the lateral view. The wire was advanced to the level of the lesser trochanter followed by introduction of the starting reamer. The nail of choice was then placed into the intramedullary canal.  Utilizing the attachment jig, a guidepin was then placed into the central aspect of the femoral head on both the AP and lateral views. Length was measured for the spiral blade and the path of the screw was drilled to the appropriate depth. The spiral blade was placed without complication and the set screw was placed proximally, loosened a 1/4 turn to allow for compression of the fracture. Again utilizing the attachment jig, a distal interlocking screw was placed into the static portion of the nail gaining excellent purchase. C-arm images were used in multiple planes showing adequate alignment of the fracture without hardware complication. Incisions were irrigated, closed in layers, and the skin was sealed with a Dermabond adhesive skin dressing. A sterile dressing was applied, the patient awakened, transported the recovery room in stable condition.     POSTOPERATIVE PLAN:  1) TTWB RLE x 6 weeks  2) DVT prophylaxis x 6 weeks  3) PT/OT     Electronically signed by Anna Us MD on 3/1/2021 at 5:49 PM

## 2021-03-01 NOTE — CONSULTS
Orthopaedic Inpatient Consultation    NAME:  Leisa Paul   : 1928  MRN: [id number]    3/1/2021  1:44 PM    Requesting Physician: Vishal Becker MD    CHIEF COMPLAINT:  right hip and proximal humerus fractures    HISTORY OF PRESENT ILLNESS:   The patient is a 80 y.o. female demented nursing home resident who fell apparently yesterday resulting in a displaced right intertrochanteric femur fracture and right proximal humerus fractures. She presents to the ED today. Pain is located in the right hip and right shoulder and rated a 3/5, constant, dull, worse with movement, better with rest and medication. There are no associated symptoms. Past Medical History:        Diagnosis Date    Anxiety     Depression     Hx of blood clots     Hypertension        Past Surgical History:        Procedure Laterality Date    APPENDECTOMY      ARM SURGERY      HYSTERECTOMY, TOTAL ABDOMINAL      LEG SURGERY         Current Medications:   Prior to Admission medications    Medication Sig Start Date End Date Taking?  Authorizing Provider   LORazepam (ATIVAN) 1 MG tablet TAKE 1 TABLET AT BEDTIME   AND YOU CAN TAKE AN EXTRA  1/2 TABLET DURING THE DAY  IF NEEDED 2/22/21 3/24/21  GEOFF Jacobo   lisinopril (PRINIVIL;ZESTRIL) 20 MG tablet TAKE 1 TABLET DAILY 21   Prieto Young, APRN   escitalopram (LEXAPRO) 10 MG tablet TAKE 1 TABLET DAILY 21   Prieto Young, GEOFF   donepezil (ARICEPT) 10 MG tablet TAKE 1 TABLET NIGHTLY 21   Prieto Young, GEOFF   amLODIPine (NORVASC) 5 MG tablet TAKE 1 TABLET DAILY 21   GEOFF Jacobo   celecoxib (CELEBREX) 200 MG capsule Take 1 capsule by mouth daily 20   Prieto Young, GEOFF   furosemide (LASIX) 40 MG tablet Take 1 tablet by mouth 2 times daily 20  GEOFF Jacobo   diclofenac sodium (VOLTAREN) 1 % GEL Apply 2 g topically 2 times daily 20   Marita Cardenas MD   aspirin EC 81 MG EC tablet Take 1 tablet by mouth daily 20 GEOFF Rendon       Allergies:  Patient has no known allergies. Social History:   Social History     Socioeconomic History    Marital status:      Spouse name: Not on file    Number of children: Not on file    Years of education: Not on file    Highest education level: Not on file   Occupational History    Not on file   Social Needs    Financial resource strain: Not on file    Food insecurity     Worry: Not on file     Inability: Not on file    Transportation needs     Medical: Not on file     Non-medical: Not on file   Tobacco Use    Smoking status: Never Smoker    Smokeless tobacco: Never Used   Substance and Sexual Activity    Alcohol use: Never     Frequency: Never    Drug use: Never    Sexual activity: Not on file   Lifestyle    Physical activity     Days per week: Not on file     Minutes per session: Not on file    Stress: Not on file   Relationships    Social connections     Talks on phone: Not on file     Gets together: Not on file     Attends Buddhist service: Not on file     Active member of club or organization: Not on file     Attends meetings of clubs or organizations: Not on file     Relationship status: Not on file    Intimate partner violence     Fear of current or ex partner: Not on file     Emotionally abused: Not on file     Physically abused: Not on file     Forced sexual activity: Not on file   Other Topics Concern    Not on file   Social History Narrative    Not on file       Family History:   Family History   Family history unknown: Yes       REVIEW OF SYSTEMS:  14 point review of systems has been reviewed from the patient's emergency room visit, reviewed with the patient on today's date with no new changes.     PHYSICAL EXAM:      Physical Examination:  Vitals:   Vitals:    03/01/21 1343   Pulse: 88   Resp: 16   Temp: 97.1 °F (36.2 °C)   SpO2: 94%   Weight: 115 lb (52.2 kg)   Height: 5' (1.524 m)     General:  Appears stated age, no distress. Orientation:  Alert and oriented to time, place, and person. Mood and Affect:  Cooperative and pleasant. Gait:  Resting comfortably in bed. Cardiovascular:  Symmetric 1-2 plus pulses in upper and lower extremities. Lymph:  No cervical or inguinal lymphadenopathy noted. Sensation:  Grossly intact to light touch. DTR:  Normal, no pathologic reflexes. Coordination/balance:  Normal    Musculoskeletal:  Right upper extremity exam:  There is no tenderness to palpation about the elbow, wrist or hand, but there is obvious discomfort about the right shoulder. There is bony crepitus. The surrounding muscle compartments are soft and compressible. There is pain with any attempted active or passive range of motion of the right shoulder. She demonstrates intact elbow, forearm, wrist and hand range of motion. The overlying skin is intact. Left upper extremity exam:  There is no tenderness to palpation about the shoulder, elbow, wrist or hand. Unrestricted full function motion is present. Stability is normal with provocative tests, 5/5 strength, and skin is normal.     Right lower extremity exam:  There is no tenderness to palpation about the knee, ankle or foot, but there is obvious pain about the right hip. The right lower extremity is shortened but not obviously rotated. The overlying skin is intact and the surrounding muscle compartments are soft and compressible. There is pain with any attempted active or passive range of motion to the right hip. Left lower extremity exam:  There is no tenderness to palpation about the hip, knee, ankle or foot. Unrestricted full function motion is present.   Stability is normal with provocative tests, 5/5 strength, and skin is normal.      DATA:    CBC with Differential:    Lab Results   Component Value Date    WBC 19.8 03/01/2021    RBC 2.95 03/01/2021    HGB 9.2 03/01/2021    HCT 28.3 03/01/2021     03/01/2021    MCV 95.9 03/01/2021    MCH 31.2 03/01/2021    MCHC 32.5 03/01/2021    RDW 14.4 03/01/2021    LYMPHOPCT 7.3 03/01/2021    MONOPCT 7.7 03/01/2021    BASOPCT 0.2 03/01/2021    MONOSABS 1.50 03/01/2021    LYMPHSABS 1.5 03/01/2021    EOSABS 0.10 03/01/2021    BASOSABS 0.00 03/01/2021     CMP:    Lab Results   Component Value Date     03/01/2021    K 5.3 03/01/2021    CL 99 03/01/2021    CO2 25 03/01/2021    BUN 40 03/01/2021    CREATININE 2.9 03/01/2021    GFRAA 18 03/01/2021    LABGLOM 15 03/01/2021    GLUCOSE 140 03/01/2021    PROT 5.9 03/01/2021    CALCIUM 8.6 03/01/2021    BILITOT 0.7 03/01/2021    ALKPHOS 80 03/01/2021    AST 19 03/01/2021    ALT 13 03/01/2021     BMP:    Lab Results   Component Value Date     03/01/2021    K 5.3 03/01/2021    CL 99 03/01/2021    CO2 25 03/01/2021    BUN 40 03/01/2021    CREATININE 2.9 03/01/2021    CALCIUM 8.6 03/01/2021    GFRAA 18 03/01/2021    LABGLOM 15 03/01/2021    GLUCOSE 140 03/01/2021         Radiology: I have reviewed the radiology images listed below and agree with the findings of the interpreting radiologist(s). Xr Humerus Right (min 2 Views)    Result Date: 3/1/2021  Exam: XR HUMERUS RIGHT (MIN 2 VIEWS) - 3/1/2021 1:55 PM Indication: Fall, concern for fracture Comparison: None available. Findings: Comminuted displaced RIGHT humeral surgical neck fracture with 1.5 cm displaced fragment. Advanced glenohumeral joint degenerative change. No evidence of shoulder dislocation. AC joint appears unremarkable. Visualized portion of the elbow joint appears unremarkable. No radiopaque foreign body or soft tissue gas. Impression: Comminuted displaced RIGHT humeral surgical neck fracture. Signed by Dr Albertina Snow on 3/1/2021 3:13 PM    Ct Head Wo Contrast    Result Date: 3/1/2021  Exam: CT HEAD WO CONTRAST - 3/1/2021 1:53 PM Indication: Fall Comparison: 8/7/2020 DLP: 675 mGy cm.  In order to have a CT radiation dose as low as reasonably achievable, Automated Exposure Control was utilized for adjustment of the mA and/or KV according to patient size. Findings: No evidence of intracranial hemorrhage. No loss of gray-white differentiation to suggest acute infarct. Chronic microvascular ischemic white matter changes again noted. No midline shift or mass effect. Lateral ventricles are nondilated. Basilar cisterns are patent. No acute orbital finding. Mastoid air cells are clear. Paranasal sinuses are clear. No acute osseous finding. Impression: 1. No acute intracranial findings. 2.  Chronic microvascular ischemic white matter change. Signed by Dr Yanna Obrien on 3/1/2021 3:02 PM    Ct Cervical Spine Wo Contrast    Result Date: 3/1/2021  Exam: [address] - 3/1/2021 1:54 PM Indication: Fall, dementia Comparison: 8/7/2020. DLP: 475 mGy cm. In order to have a CT radiation dose as low as reasonably achievable, Automated Exposure Control was utilized for adjustment of the mA and/or KV according to patient size. Findings: Craniocervical relationships are maintained. The odontoid process is intact. Predental space is appropriate. C1 arch is intact. Cervical spine alignment is anatomic. Redemonstrated chronic T2 mild compression deformity. Cervical vertebral body heights are maintained. No acute fracture or subluxation. Moderate multilevel cervical spine degenerative change. No apical pneumothorax. No acute soft tissue finding. Impression: 1. No acute fracture or subluxation. 2.  Chronic T2 compression deformity. Signed by Dr Yanna Obrien on 3/1/2021 3:09 PM    Xr Chest Portable    Result Date: 3/1/2021  Exam: XR CHEST PORTABLE - 3/1/2021 1:55 PM Indication: Fall Comparison: 8/7/2020. Findings: Cardiac silhouette is stable. Chronic mild RIGHT hemidiaphragm elevation. No pleural effusion, pneumothorax, or consolidation. Partially imaged RIGHT humeral surgical neck fracture. Impression: 1. No acute cardiopulmonary finding. 2.  Partially imaged RIGHT humeral neck fracture.  Signed by

## 2021-03-02 PROBLEM — N17.9 ACUTE KIDNEY INJURY (HCC): Status: ACTIVE | Noted: 2021-03-02

## 2021-03-02 PROBLEM — I95.9 HYPOTENSION: Status: ACTIVE | Noted: 2021-03-02

## 2021-03-02 LAB
ANION GAP SERPL CALCULATED.3IONS-SCNC: 18 MMOL/L (ref 7–19)
BASOPHILS ABSOLUTE: 0 K/UL (ref 0–0.2)
BASOPHILS RELATIVE PERCENT: 0.2 % (ref 0–1)
BUN BLDV-MCNC: 47 MG/DL (ref 8–23)
CALCIUM SERPL-MCNC: 8.3 MG/DL (ref 8.2–9.6)
CHLORIDE BLD-SCNC: 104 MMOL/L (ref 98–111)
CO2: 22 MMOL/L (ref 22–29)
CREAT SERPL-MCNC: 2.9 MG/DL (ref 0.5–0.9)
EKG P AXIS: 44 DEGREES
EKG P-R INTERVAL: 158 MS
EKG Q-T INTERVAL: 366 MS
EKG QRS DURATION: 80 MS
EKG QTC CALCULATION (BAZETT): 388 MS
EKG T AXIS: 110 DEGREES
EOSINOPHILS ABSOLUTE: 0 K/UL (ref 0–0.6)
EOSINOPHILS RELATIVE PERCENT: 0.1 % (ref 0–5)
GFR AFRICAN AMERICAN: 18
GFR NON-AFRICAN AMERICAN: 15
GLUCOSE BLD-MCNC: 119 MG/DL (ref 74–109)
HCT VFR BLD CALC: 24 % (ref 37–47)
HEMOGLOBIN: 7.6 G/DL (ref 12–16)
IMMATURE GRANULOCYTES #: 0.1 K/UL
LYMPHOCYTES ABSOLUTE: 1.5 K/UL (ref 1.1–4.5)
LYMPHOCYTES RELATIVE PERCENT: 9.3 % (ref 20–40)
MCH RBC QN AUTO: 31.1 PG (ref 27–31)
MCHC RBC AUTO-ENTMCNC: 31.7 G/DL (ref 33–37)
MCV RBC AUTO: 98.4 FL (ref 81–99)
MONOCYTES ABSOLUTE: 1.2 K/UL (ref 0–0.9)
MONOCYTES RELATIVE PERCENT: 7.5 % (ref 0–10)
NEUTROPHILS ABSOLUTE: 13.5 K/UL (ref 1.5–7.5)
NEUTROPHILS RELATIVE PERCENT: 82 % (ref 50–65)
PDW BLD-RTO: 14.3 % (ref 11.5–14.5)
PLATELET # BLD: 206 K/UL (ref 130–400)
PMV BLD AUTO: 9.9 FL (ref 9.4–12.3)
POTASSIUM REFLEX MAGNESIUM: 5.4 MMOL/L (ref 3.5–5)
RBC # BLD: 2.44 M/UL (ref 4.2–5.4)
SODIUM BLD-SCNC: 144 MMOL/L (ref 136–145)
WBC # BLD: 16.4 K/UL (ref 4.8–10.8)

## 2021-03-02 PROCEDURE — 6370000000 HC RX 637 (ALT 250 FOR IP): Performed by: ORTHOPAEDIC SURGERY

## 2021-03-02 PROCEDURE — 6370000000 HC RX 637 (ALT 250 FOR IP): Performed by: FAMILY MEDICINE

## 2021-03-02 PROCEDURE — 1210000000 HC MED SURG R&B

## 2021-03-02 PROCEDURE — 93010 ELECTROCARDIOGRAM REPORT: CPT | Performed by: INTERNAL MEDICINE

## 2021-03-02 PROCEDURE — 97162 PT EVAL MOD COMPLEX 30 MIN: CPT

## 2021-03-02 PROCEDURE — 85025 COMPLETE CBC W/AUTO DIFF WBC: CPT

## 2021-03-02 PROCEDURE — 97535 SELF CARE MNGMENT TRAINING: CPT

## 2021-03-02 PROCEDURE — 97530 THERAPEUTIC ACTIVITIES: CPT

## 2021-03-02 PROCEDURE — 2580000003 HC RX 258: Performed by: ANESTHESIOLOGY

## 2021-03-02 PROCEDURE — 80048 BASIC METABOLIC PNL TOTAL CA: CPT

## 2021-03-02 PROCEDURE — 36415 COLL VENOUS BLD VENIPUNCTURE: CPT

## 2021-03-02 PROCEDURE — 97110 THERAPEUTIC EXERCISES: CPT

## 2021-03-02 RX ORDER — SODIUM POLYSTYRENE SULFONATE 15 G/60ML
15 SUSPENSION ORAL; RECTAL ONCE
Status: COMPLETED | OUTPATIENT
Start: 2021-03-02 | End: 2021-03-02

## 2021-03-02 RX ADMIN — SODIUM CHLORIDE, SODIUM LACTATE, POTASSIUM CHLORIDE, AND CALCIUM CHLORIDE: 600; 310; 30; 20 INJECTION, SOLUTION INTRAVENOUS at 03:50

## 2021-03-02 RX ADMIN — DOCUSATE SODIUM AND SENNOSIDES 1 TABLET: 8.6; 5 TABLET, FILM COATED ORAL at 19:49

## 2021-03-02 RX ADMIN — APIXABAN 2.5 MG: 2.5 TABLET, FILM COATED ORAL at 19:49

## 2021-03-02 RX ADMIN — DOCUSATE SODIUM AND SENNOSIDES 1 TABLET: 8.6; 5 TABLET, FILM COATED ORAL at 10:26

## 2021-03-02 RX ADMIN — APIXABAN 2.5 MG: 2.5 TABLET, FILM COATED ORAL at 03:50

## 2021-03-02 RX ADMIN — ASPIRIN 81 MG: 81 TABLET, COATED ORAL at 10:27

## 2021-03-02 RX ADMIN — ACETAMINOPHEN 650 MG: 325 TABLET ORAL at 19:50

## 2021-03-02 RX ADMIN — ESCITALOPRAM OXALATE 10 MG: 10 TABLET, FILM COATED ORAL at 10:27

## 2021-03-02 RX ADMIN — DONEPEZIL HYDROCHLORIDE 10 MG: 5 TABLET, FILM COATED ORAL at 19:49

## 2021-03-02 RX ADMIN — SODIUM POLYSTYRENE SULFONATE 15 G: 15 SUSPENSION ORAL; RECTAL at 10:27

## 2021-03-02 ASSESSMENT — PAIN DESCRIPTION - LOCATION: LOCATION: HIP;SHOULDER

## 2021-03-02 ASSESSMENT — PAIN - FUNCTIONAL ASSESSMENT: PAIN_FUNCTIONAL_ASSESSMENT: PREVENTS OR INTERFERES SOME ACTIVE ACTIVITIES AND ADLS

## 2021-03-02 ASSESSMENT — PAIN DESCRIPTION - PAIN TYPE: TYPE: ACUTE PAIN

## 2021-03-02 ASSESSMENT — PAIN DESCRIPTION - FREQUENCY: FREQUENCY: INTERMITTENT

## 2021-03-02 ASSESSMENT — PAIN SCALES - WONG BAKER: WONGBAKER_NUMERICALRESPONSE: 6

## 2021-03-02 ASSESSMENT — PAIN DESCRIPTION - PROGRESSION
CLINICAL_PROGRESSION: NOT CHANGED
CLINICAL_PROGRESSION: NOT CHANGED

## 2021-03-02 ASSESSMENT — PAIN DESCRIPTION - ORIENTATION
ORIENTATION: RIGHT
ORIENTATION: RIGHT

## 2021-03-02 ASSESSMENT — PAIN DESCRIPTION - DESCRIPTORS: DESCRIPTORS: ACHING

## 2021-03-02 NOTE — PROGRESS NOTES
03/02/21 1511   Restrictions/Precautions   Restrictions/Precautions Weight Bearing   Required Braces or Orthoses? Yes   Lower Extremity Weight Bearing Restrictions   Right Lower Extremity Weight Bearing Toe Touch Weight Bearing   Upper Extremity Weight Bearing Restrictions   Right Upper Extremity Weight Bearing Non Weight Bearing   Required Braces or Orthoses   Right Upper Extremity Brace/Splint Sling   General   Chart Reviewed Yes   Subjective   Subjective Is this gonna hurt ? Pain Screening   Intervention List Patient able to continue with treatment   Pain Assessment   Pain Assessment Faces   Pain Type Acute pain   Pain Location Hip   Pain Orientation Right   Pain Descriptors Aching   Pain Frequency Intermittent   Clinical Progression Not changed   Functional Pain Assessment Prevents or interferes some active activities and ADLs   Aguilar-Shaw Pain Rating 6   Pain Onset   (with movement)   Oxygen Therapy   O2 Device Nasal cannula   Orientation   Overall Orientation Status X   Bed Mobility   Sit to Supine Maximal assistance   Scooting Dependent/Total   Comment Assist x 2   Transfers   Bed to Chair Dependent/Total;2 Person Assistance   Comment Patient TR BTB via SB   Ambulation   Ambulation? No   WB Status NWB RUE, TTWB RLE   Activity Tolerance   Activity Tolerance Patient limited by cognitive status   Safety Devices   Type of devices Left in bed;Call light within reach; Bed alarm in place  (Family present)   Physical Therapy    Electronically signed by Sol Peraza PTA on 3/2/2021 at 3:30 PM

## 2021-03-02 NOTE — CARE COORDINATION
SRUTHI met with Pt/POA in the room regarding DC planning options. Pt has been at [facility] in the past and Pt/POA were very happy with the therapy department. Pt/POA requested a referral to be sent to [facility].  Awaiting approval/denial. .  [place]   P  [id number]  Electronically signed by Thad Butler on 3/2/2021 at 11:08 AM

## 2021-03-02 NOTE — PROGRESS NOTES
Physician Progress Note      Nani Leon  CSN #:                  [id number]  :                       1928  ADMIT DATE:       3/1/2021 1:44 PM  [address] [place] DATE:  RESPONDING  PROVIDER #:        JAE CHAMBERS DO          QUERY TEXT:    Pt admitted with Right hip fracture. Pt noted to have elevated creatinine   2.9. . If possible, please document in the progress notes and discharge   summary if you are evaluating and/or treating any of the following: The medical record reflects the following:  Risk Factors: HTN, Dementia  Clinical Indicators: creatinine 2.9  Treatment:  labs, IVF 75 ml/hr    Thank you    Miguelangel Suarez RN, BSN, [facility]  [phone]  Options provided:  -- Acute kidney failure  -- Acute kidney failure with acute tubular necrosis  -- Acute kidney injury  -- Other - I will add my own diagnosis  -- Disagree - Not applicable / Not valid  -- Disagree - Clinically unable to determine / Unknown  -- Refer to Clinical Documentation Reviewer    PROVIDER RESPONSE TEXT:    This patient is in Acute kidney failure.     Query created by: Varinder Kent on 3/2/2021 12:16 PM      Electronically signed by:  Tyrone Castro DO 3/2/2021 12:23 PM

## 2021-03-02 NOTE — PROGRESS NOTES
Occupational Therapy  Facility/Department: [facility] SURG SERVICES  Daily Treatment Note  NAME: Carroll Ayala  : 1928  MRN: [id number]    Date of Service: 3/2/2021    Discharge Recommendations:          Assessment   Performance deficits / Impairments: Decreased functional mobility ; Decreased ADL status; Decreased endurance;Decreased safe awareness;Decreased cognition;Decreased balance  Assessment: Pt. anxious about transfers. Treatment Diagnosis: R humerus fx (non-operative), R femur fx s/p pin  Prognosis: Good  Decision Making: Low Complexity  REQUIRES OT FOLLOW UP: Yes  Activity Tolerance  Activity Tolerance: Patient Tolerated treatment well         Patient Diagnosis(es): The primary encounter diagnosis was Fall from standing, initial encounter. Diagnoses of Displaced intertrochanteric fracture of right femur, init ([facility] [address] Closed 3-part fracture of proximal end of right humerus, initial encounter, Dementia without behavioral disturbance, unspecified dementia type ([facility] [address] Acute renal insufficiency, and Anemia, unspecified type were also pertinent to this visit. has a past medical history of Anxiety, Depression, Hx of blood clots, and Hypertension. has a past surgical history that includes Leg Surgery; Arm Surgery; Appendectomy; Hysterectomy, total abdominal; and Femur fracture surgery (Right, 3/1/2021). Restrictions  Restrictions/Precautions  Restrictions/Precautions: Weight Bearing  Required Braces or Orthoses?: Yes  Lower Extremity Weight Bearing Restrictions  Right Lower Extremity Weight Bearing: Toe Touch Weight Bearing  Upper Extremity Weight Bearing Restrictions  Right Upper Extremity Weight Bearing: Non Weight Bearing  Required Braces or Orthoses  Right Upper Extremity Brace/Splint: Sling  Subjective   General  Chart Reviewed: Yes  Patient assessed for rehabilitation services?: Yes  Additional Pertinent Hx: Lives with daughter  Family / Caregiver Present: No  Diagnosis: R humerus fx.  (non-surgical), R femur fx. s/p femoral pin  General Comment  Comments: Pt. had been up in drop arm chair over 4 hours, ready to return to bed  Pain Assessment  Pain Assessment: Faces  Pain Type: Acute pain  Pain Location: Hip; Shoulder  Pain Orientation: Right  Pain Descriptors: Aching  Pain Frequency: Intermittent  Clinical Progression: Not changed  Functional Pain Assessment: Prevents or interferes some active activities and ADLs  Non-Pharmaceutical Pain Intervention(s): Rest  Response to Pain Intervention: Patient Satisfied  Vital Signs  Patient Currently in Pain: Denies   Orientation  Orientation  Overall Orientation Status: Impaired  Orientation Level: Oriented to person;Disoriented to place; Disoriented to time;Disoriented to situation  Objective    ADL  Feeding: Setup  Grooming: Supervision  UE Bathing: Moderate assistance  LE Bathing: Maximum assistance  UE Dressing: Maximum assistance  LE Dressing: Dependent/Total  Toileting: Dependent/Total              Transfers  Slide Board: 2 Person assistance;Dependent/Total  Transfer Comments: Dependent x 2 slightly up hill sliding back to bed on sliding board                       Cognition  Overall Cognitive Status: Exceptions  Following Commands:  Follows one step commands with increased time  Attention Span: Difficulty dividing attention  Memory: Decreased short term memory;Decreased recall of precautions;Decreased recall of recent events  Safety Judgement: Decreased awareness of need for assistance;Decreased awareness of need for safety  Problem Solving: Assistance required to generate solutions;Assistance required to implement solutions  Insights: Not aware of deficits  Initiation: Requires cues for all  Sequencing: Requires cues for all                       LUE AROM (degrees)  LUE AROM : WFL  RUE AROM (degrees)  RUE AROM : Exceptions  RUE General AROM: RUE immobilized in a sling (non-surgical)                 Plan   Plan  Times per week: 3-5x/week  Times per day: Daily  G-Code OutComes Score                                                  AM-PAC Score             Goals  Short term goals  Time Frame for Short term goals: 1 week  Short term goal 1: Bozena with sliding tfers from bed to recliner or drop arm commode  Short term goal 2: Sit-stand with Mod A of 2  Short term goal 3: Mod A with supine to sit EOB  Long term goals  Long term goal 1: Return to PLOF       Therapy Time   Individual Concurrent Group Co-treatment   Time In           Time Out           Minutes                   Kendal Anderson, OT

## 2021-03-02 NOTE — PROGRESS NOTES
Occupational Therapy   Occupational Therapy Initial Assessment  Date: 3/2/2021   Patient Name: Evie Vanessa  MRN: [id number]     : 1928    Date of Service: 3/2/2021    Discharge Recommendations:          Assessment   Performance deficits / Impairments: Decreased functional mobility ; Decreased ADL status; Decreased endurance;Decreased safe awareness;Decreased cognition;Decreased balance  Assessment: Will progress as tolerated  Treatment Diagnosis: R humerus fx (non-operative), R femur fx s/p pin  Prognosis: Good  Decision Making: Low Complexity  REQUIRES OT FOLLOW UP: Yes  Activity Tolerance  Activity Tolerance: Patient Tolerated treatment well           Patient Diagnosis(es): The primary encounter diagnosis was Fall from standing, initial encounter. Diagnoses of Displaced intertrochanteric fracture of right femur, init ([facility] [address] Closed 3-part fracture of proximal end of right humerus, initial encounter, Dementia without behavioral disturbance, unspecified dementia type ([facility] [address] Acute renal insufficiency, and Anemia, unspecified type were also pertinent to this visit. has a past medical history of Anxiety, Depression, Hx of blood clots, and Hypertension. has a past surgical history that includes Leg Surgery; Arm Surgery; Appendectomy; Hysterectomy, total abdominal; and Femur fracture surgery (Right, 3/1/2021).     Treatment Diagnosis: R humerus fx (non-operative), R femur fx s/p pin      Restrictions  Restrictions/Precautions  Restrictions/Precautions: Weight Bearing  Required Braces or Orthoses?: Yes  Lower Extremity Weight Bearing Restrictions  Right Lower Extremity Weight Bearing: Toe Touch Weight Bearing  Upper Extremity Weight Bearing Restrictions  Right Upper Extremity Weight Bearing: Non Weight Bearing  Required Braces or Orthoses  Right Upper Extremity Brace/Splint: Sling    Subjective   General  Chart Reviewed: Yes  Patient assessed for rehabilitation services?: Yes  Additional Pertinent Hx: Lives with daughter  Family / Caregiver Present: No  Diagnosis: R humerus fx. (non-surgical), R femur fx. s/p femoral pin  Patient Currently in Pain: Yes  Pain Assessment  Pain Assessment: Faces  Pain Type: Acute pain  Pain Location: Hip; Shoulder  Pain Orientation: Right  Pain Descriptors: Aching  Pain Frequency: Intermittent  Clinical Progression: Not changed  Functional Pain Assessment: Prevents or interferes some active activities and ADLs  Non-Pharmaceutical Pain Intervention(s): Rest  Response to Pain Intervention: Patient Satisfied  Vital Signs  Patient Currently in Pain: Yes  Social/Functional History  Social/Functional History  Lives With: Daughter  ADL Assistance: Independent  Ambulation Assistance: Independent  Transfer Assistance: Independent  Additional Comments: Pt. confused and not considered a reliable historian       Objective   Vision: Within Functional Limits  Hearing: Within functional limits    Orientation  Overall Orientation Status: Impaired  Orientation Level: Oriented to person;Disoriented to place; Disoriented to time;Disoriented to situation        ADL  Feeding: Setup  Grooming: Supervision  UE Bathing: Moderate assistance  LE Bathing: Maximum assistance  UE Dressing: Maximum assistance  LE Dressing: Dependent/Total  Toileting: Dependent/Total           Transfers  Slide Board: 2 Person assistance;Maximum assistance     Cognition  Overall Cognitive Status: Exceptions  Following Commands:  Follows one step commands with increased time  Attention Span: Difficulty dividing attention  Memory: Decreased short term memory  Safety Judgement: Decreased awareness of need for assistance  Insights: Not aware of deficits  Initiation: Requires cues for all  Sequencing: Requires cues for all                 LUE AROM (degrees)  LUE AROM : WFL  RUE AROM (degrees)  RUE AROM : Exceptions  RUE General AROM: RUE immobilized in a sling (non-surgical)  LUE Strength  Gross LUE Strength: WFL  RUE Strength  Gross RUE Strength: Exceptions to WFL(immobilized in sling)                   Plan   Plan  Times per week: 3-5x/week  Times per day: Daily    G-Code     OutComes Score                                                  AM-PAC Score             Goals  Short term goals  Time Frame for Short term goals: 1 week  Short term goal 1: Bozena with sliding tfers from bed to recliner or drop arm commode  Short term goal 2: Sit-stand with Mod A of 2  Short term goal 3: Mod A with supine to sit EOB  Long term goals  Long term goal 1: Return to PLOF       Therapy Time   Individual Concurrent Group Co-treatment   Time In           Time Out           Minutes                   Sarah Hernandez, OT

## 2021-03-02 NOTE — BRIEF OP NOTE
Brief Postoperative Note      Patient: Leisa Paul  YOB: 1928  MRN: [id number]    Date of Procedure: 3/1/2021    Pre-Op Diagnosis: fracture    Post-Op Diagnosis: Same       Procedure(s): FEMUR IM NAIL YAZAN INSERTION Short    Surgeon(s):  Karen Lou MD    Assistant:  * No surgical staff found *    Anesthesia: General    Estimated Blood Loss (mL): less than 50     Complications: None    Specimens:   * No specimens in log *    Implants:  Implant Name Type Inv.  Item Serial No.  Lot No. LRB No. Used Action   NAIL IM L170MM [id number] 130DEG SHT FEM GRN TI KAMALJIT PHILIP  NAIL IM L170MM [id number] 130DEG SHT FEM GRN TI KAMALJIT PHILIP  DEPU[employer] [id number] Right 1 Implanted   BLADE IM L90MM DIA10.35MM PROX FEM G TI KAMALJIT FEN JUNIOR FOR  BLADE IM L90MM DIA10.35MM PROX FEM G TI KAMALJIT FEN JUNIOR FOR  [employer]PU[employer]  Right 1 Implanted   SCREW BNE L36MM DIA5MM TIB LT GRN TI ST KAMALJIT ROCIO FULL THRD  SCREW BNE L36MM DIA5MM TIB LT GRN TI ST KAMALJIT ROCIO FULL THRD  DEPU[employer]  Right 1 Implanted         Drains: * No LDAs found *    Findings: Displaced right intertrochanteric femur fracture    Electronically signed by Karen Lou MD on 3/1/2021 at 7:26 PM

## 2021-03-02 NOTE — CARE COORDINATION
Pre-cert has been initiated with Pt insurance for acceptance at "[facility] Admissions will notify  once pre-cert has cleared.    [place]   F  Electronically signed by Veena Reyes on 3/2/2021 at 2:55 PM

## 2021-03-02 NOTE — PLAN OF CARE
Problem: Falls - Risk of:  Goal: Will remain free from falls  Description: Will remain free from falls  Outcome: Ongoing  Goal: Absence of physical injury  Description: Absence of physical injury  Outcome: Ongoing     Problem: Falls - Risk of:  Goal: Will remain free from falls  Description: Will remain free from falls  Outcome: Ongoing     Problem: Falls - Risk of:  Goal: Absence of physical injury  Description: Absence of physical injury  Outcome: Ongoing

## 2021-03-02 NOTE — ANESTHESIA POSTPROCEDURE EVALUATION
Department of Anesthesiology  Postprocedure Note    Patient: Krystina Khan  MRN: [id number]  YOB: 1928  Date of evaluation: 3/1/2021  Time:  7:36 PM     Procedure Summary     Date: 03/01/21 Room / Location: [address]    Anesthesia Start: 1820 Anesthesia Stop: 1936    Procedure: FEMUR IM NAIL YAZAN INSERTION Short (Right Hip) Diagnosis: (fracture)    Surgeons: Minnie Joseph MD Responsible Provider: GEOFF Reilly CRNA    Anesthesia Type: general ASA Status: 3 - Emergent          Anesthesia Type: general    Armond Phase I: Armond Score: 8    Armond Phase II:      Last vitals: Reviewed and per EMR flowsheets.        Anesthesia Post Evaluation    Patient location during evaluation: PACU  Patient participation: complete - patient participated  Level of consciousness: awake and alert  Pain score: 0  Airway patency: patent  Nausea & Vomiting: no nausea and no vomiting  Complications: no  Cardiovascular status: blood pressure returned to baseline  Respiratory status: acceptable, spontaneous ventilation and room air  Hydration status: stable

## 2021-03-02 NOTE — PROGRESS NOTES
Physical Therapy    Facility/Department: [facility] SURG SERVICES  Initial Assessment    NAME: Opal Jaquez  : 1928  MRN: [id number]    Date of Service: 3/2/2021    Discharge Recommendations:  Continue to assess pending progress, Patient would benefit from continued therapy after discharge, 24 hour supervision or assist        Assessment   Body structures, Functions, Activity limitations: Decreased functional mobility ; Decreased ROM; Decreased strength;Decreased safe awareness;Decreased cognition;Decreased posture;Decreased coordination;Decreased balance  Assessment: Pt. will benefit from cont. PT to decrease impairments. Pt. a fall risk and should not attempt mobility on her own at this time due to NWB RUE and TTWB RLE. Pt. most likely will be a lateral transfer due to difficulty standing with WB restrictions. Pt. will benefit from 24 hr care and cont. rehab after d/c from [facility]. Treatment Diagnosis: impaired gait and mobility  Prognosis: Fair  Decision Making: Medium Complexity  PT Education: Goals;PT Role;General Safety  Patient Education: use of call light  Barriers to Learning: dementia  REQUIRES PT FOLLOW UP: Yes  Activity Tolerance  Activity Tolerance: Patient limited by cognitive status; Patient Tolerated treatment well  Activity Tolerance: limited by dementia, fear of pain       Patient Diagnosis(es): The primary encounter diagnosis was Fall from standing, initial encounter. Diagnoses of Displaced intertrochanteric fracture of right femur, init ([facility] [address] Closed 3-part fracture of proximal end of right humerus, initial encounter, Dementia without behavioral disturbance, unspecified dementia type ([address] Acute renal insufficiency, and Anemia, unspecified type were also pertinent to this visit. has a past medical history of Anxiety, Depression, Hx of blood clots, and Hypertension. has a past surgical history that includes Leg Surgery; Arm Surgery; Appendectomy;  Hysterectomy, total abdominal; and Femur fracture surgery (Right, 3/1/2021). Restrictions  Restrictions/Precautions  Restrictions/Precautions: Weight Bearing  Required Braces or Orthoses?: Yes  Lower Extremity Weight Bearing Restrictions  Right Lower Extremity Weight Bearing: Toe Touch Weight Bearing  Upper Extremity Weight Bearing Restrictions  Right Upper Extremity Weight Bearing: Non Weight Bearing  Required Braces or Orthoses  Right Upper Extremity Brace/Splint: Sling  Vision/Hearing  Vision: Within Functional Limits  Hearing: Within functional limits     Subjective  General  Chart Reviewed: Yes  Patient assessed for rehabilitation services?: Yes  Response To Previous Treatment: Not applicable  Family / Caregiver Present: No  Referring Practitioner: Zackary Balbuena MD  Diagnosis: Acute traumatic displaced intertrochanteric fracture of the Right femur, R humeral fx  General Comment  Comments: 3/1/21 Cephalomedullary Nailing Right closed displaced Intertrochanteric hip fracture, non operative R humeral fx  Subjective  Subjective: Pt. asking several times \"Is it gonna hurt? \"  Pain Screening  Patient Currently in Pain: Denies  Pain Assessment  Pain Assessment: Advanced Dementia  Functional Pain Assessment: Prevents or interferes some active activities and ADLs  Non-Pharmaceutical Pain Intervention(s): Repositioned; Ambulation/Increased Activity  Response to Pain Intervention: Patient Satisfied  Vital Signs  Patient Currently in Pain: Denies  Pre Treatment Pain Screening  Intervention List: Patient able to continue with treatment    Orientation  Orientation  Overall Orientation Status: Impaired  Orientation Level: Oriented to person;Disoriented to situation;Disoriented to time;Disoriented to place  Social/Functional History  Social/Functional History  Lives With: Daughter  ADL Assistance: Independent  Ambulation Assistance: Independent  Transfer Assistance: Independent  Additional Comments: Pt. confused and not considered a reliable historian  Cognition Cognition  Overall Cognitive Status: Exceptions  Following Commands:  Follows one step commands with increased time  Attention Span: Difficulty dividing attention  Memory: Decreased short term memory;Decreased recall of precautions;Decreased recall of recent events  Safety Judgement: Decreased awareness of need for assistance;Decreased awareness of need for safety  Problem Solving: Assistance required to generate solutions;Assistance required to implement solutions  Insights: Not aware of deficits  Initiation: Requires cues for all  Sequencing: Requires cues for all    Objective     Observation/Palpation  Posture: Fair  Observation: IV, O2, sling RUE    AROM RLE (degrees)  RLE AROM: Exceptions  RLE General AROM: ankle WFLs, knee flexes to 90, lacks full extension, hip flexes to 90 in sitting  AROM LLE (degrees)  LLE AROM : WFL  Strength RLE  Strength RLE: Exception  Comment: grossly 3-/5  Strength LLE  Strength LLE: Exception  Comment: grossly 4-/5        Bed mobility  Rolling to Left: Maximum assistance  Rolling to Right: Maximum assistance  Supine to Sit: Maximum assistance;2 Person assistance  Scooting: Maximal assistance;2 Person assistance  Comment: draw pad used to scoot pt to EOB  Transfers  Sit to Stand: Maximum Assistance;2 Person Assistance  Stand to sit: Maximum Assistance;2 Person Assistance  Bed to Chair: Maximum assistance;2 Person Assistance  Lateral Transfers: 2 Person Assistance;Maximum Assistance  Comment: Pt. with difficulty standing due to TTWB RLE, 2 attempts, scooted over to drop arm chair  Ambulation  Ambulation?: No  WB Status: NWB RUE, TTWB RLE     Balance  Posture: Fair  Sitting - Static: Fair;+  Sitting - Dynamic: -;Fair  Standing - Static: Poor;-  Standing - Dynamic: Poor;-  Exercises  Knee Long Arc Quad: x10  Ankle Pumps: x10     Plan   Plan  Times per week: 3-7  Times per day: Daily  Plan weeks: 2  Current Treatment Recommendations: Strengthening, ROM, Balance Training, Functional Mobility Training, Transfer Training, Endurance Training, Safety Education & Training, Positioning, Equipment Evaluation, Education, & procurement, Patient/Caregiver Education & Training  Plan Comment: cont. PT per POC.   Safety Devices  Type of devices: Gait belt, Patient at risk for falls, Nurse notified, Left in bed, Call light within reach, Chair alarm in place, Left in chair(reclined)    G-Code       OutComes Score                                                  AM-PAC Score             Goals  Short term goals  Time Frame for Short term goals: 2 wks  Short term goal 1: supine to sit indep  Short term goal 2: sit to stand indep  Short term goal 3: bed to drop arm chair Min A  Patient Goals   Patient goals : get better       Therapy Time   Individual Concurrent Group Co-treatment   Time In           Time Out           Minutes                   Maggi Talavera PT    Electronically signed by Maggi Talavera PT on 3/2/2021 at 10:33 AM

## 2021-03-02 NOTE — PROGRESS NOTES
Hospitalist Progress Note  3/2/2021 2:09 PM  Subjective:   Admit Date: 3/1/2021  PCP: GEOFF Mishra    Chief Complaint: right hip pain    Subjective: Patient's pain is controlled on examination this morning. She remains confused but at apparent baseline. Following my examination, she is noted by nursing to by hypotensive despite holding antihypertensives. Not eating and drinking much. History is otherwise unchanged. Cumulative Hospital History:   3-1: Brought to ED by daughter with continued right hip pain after fall 2-28 in the early morning. Patient had been up to the bathroom, stood up without pulling up her pants, and tripped and fell on her right hip. EMS was summoned, who got the patient up and to bed but she refused to come to the hospital. When her pain did not improve, she had a virtual visit with her PCP, who recommended coming to the ED for imaging, which confirmed fractures of the right femoral and humeral necks. Orthopedic surgery contacted, who recommended sling immobilization of the upper extremity and surgery for hip. Taken to OR in the afternoon for intramedullary nailing. 3-2: Pain controlled, worked with therapy. Family seeking placement at [facility] for rehab. The patient is hypotensive this afternoon after holding morning antihypertensives. Increase IV fluid rate, formally hold these medications. ROS: 14 point review of systems is negative except as specifically addressed above. Dietary Nutrition Supplements: Standard High Calorie Oral Supplement  DIET GENERAL;     Intake/Output Summary (Last 24 hours) at 3/2/2021 1409  Last data filed at 3/2/2021 0935  Gross per 24 hour   Intake 120 ml   Output 50 ml   Net 70 ml     Medications:   [MAR Hold] lactated ringers      lactated ringers 125 mL/hr at 03/02/21 1201     Current Facility-Administered Medications   Medication Dose Route Frequency Provider Last Rate Last Admin    [Held by provider] amLODIPine (NORVASC) tablet 5 mg  5 mg Oral Daily Ritta Mara, DO   Stopped at 03/02/21 1027    aspirin EC tablet 81 mg  81 mg Oral Daily Marco Franco, DO   81 mg at 03/02/21 1027    donepezil (ARICEPT) tablet 10 mg  10 mg Oral Nightly Marco Franco, DO        escitalopram (LEXAPRO) tablet 10 mg  10 mg Oral Daily Marco Franco, DO   10 mg at 03/02/21 1027    [Held by provider] lisinopril (PRINIVIL;ZESTRIL) tablet 20 mg  20 mg Oral Daily Ritta Mara, DO   Stopped at 03/02/21 1027    sodium chloride flush 0.9 % injection 10 mL  10 mL Intravenous 2 times per day Ritta Mara, DO        sodium chloride flush 0.9 % injection 10 mL  10 mL Intravenous PRN Ritta Mara, DO        promethazine (PHENERGAN) tablet 12.5 mg  12.5 mg Oral Q6H PRN Ritta Mara, DO        Or    [facility]ron [facility]) injection 4 mg  4 mg Intravenous Q6H PRN Ritta Mara, DO        polyethylene glycol (GLYCOLAX) packet 17 g  17 g Oral Daily PRN Ritta Mara, DO        morphine injection 2 mg  2 mg Intravenous Q2H PRN Ritta Mara, DO        Or    morphine injection 4 mg  4 mg Intravenous Q2H PRN Ritta Mara, DO        sennosides-docusate sodium (SENOKOT-S) 8.6-50 MG tablet 1 tablet  1 tablet Oral BID Ritta Mara, DO   1 tablet at 03/02/21 1026    [MAR Hold] lactated ringers infusion 1,000 mL  1,000 mL Intravenous Once Cindy Bolton MD        lactated ringers infusion   Intravenous Continuous Sodus Franco,  mL/hr at 03/02/21 1201 Rate Change at 03/02/21 1201    sodium chloride flush 0.9 % injection 10 mL  10 mL Intravenous 2 times per day Fredrick Zuleta MD        sodium chloride flush 0.9 % injection 10 mL  10 mL Intravenous PRN Fredrick Zuleta MD        promethazine (PHENERGAN) tablet 12.5 mg  12.5 mg Oral Q6H PRN Fredrick Zuleta MD        Or    ondansetron [facility]) injection 4 mg  4 mg Intravenous Q6H PRN Fredrick Zuleta MD        magnesium hydroxide (MILK OF MAGNESIA) 400 MG/5ML suspension 30 mL  30 mL Oral Daily PRN Fredrick Zuleta MD        acetaminophen (TYLENOL) tablet 650 mg  650 mg Oral Q4H PRN Dimas RAMIREZ Nikki Morton MD        oxyCODONE (ROXICODONE) immediate release tablet 5 mg  5 mg Oral Q4H PRN Minnie Joseph MD        Or    oxyCODONE (ROXICODONE) immediate release tablet 10 mg  10 mg Oral Q4H PRN Minnie Joseph MD        morphine injection 2 mg  2 mg Intravenous Q2H PRN Minnie Joseph MD        Or    morphine injection 4 mg  4 mg Intravenous Q2H PRN Minnie Joseph MD        apixaban [facility]) tablet 2.5 mg  2.5 mg Oral BID Minnie Joseph MD   2.5 mg at 03/02/21 0350        Labs:     Recent Labs     03/01/21  1611 03/02/21  0516   WBC 19.8* 16.4*   RBC 2.95* 2.44*   HGB 9.2* 7.6*   HCT 28.3* 24.0*   MCV 95.9 98.4   MCH 31.2* 31.1*   MCHC 32.5* 31.7*    206     Recent Labs     03/01/21  1611 03/02/21  0516    144   K 5.3* 5.4*   ANIONGAP 13 18   CL 99 104   CO2 25 22   BUN 40* 47*   CREATININE 2.9* 2.9*   GLUCOSE 140* 119*   CALCIUM 8.6 8.3     No results for input(s): MG, PHOS in the last 72 hours. Recent Labs     03/01/21  1611   AST 19   ALT 13   BILITOT 0.7   ALKPHOS 80     ABGs:No results for input(s): PH, PO2, PCO2, HCO3, BE, O2SAT in the last 72 hours. Troponin T: No results for input(s): TROPONINI in the last 72 hours. INR:   Recent Labs     03/01/21  1611 03/01/21  2141   INR 1.17 1.17     Lactic Acid: No results for input(s): LACTA in the last 72 hours.     Objective:   Vitals: BP (!) 81/47   Pulse 78   Temp 98 °F (36.7 °C) (Temporal)   Resp 16   Ht 5' (1.524 m)   Wt 115 lb (52.2 kg)   SpO2 96%   BMI 22.46 kg/m²   24HR INTAKE/OUTPUT:      Intake/Output Summary (Last 24 hours) at 3/2/2021 1409  Last data filed at 3/2/2021 0935  Gross per 24 hour   Intake 120 ml   Output 50 ml   Net 70 ml     General appearance: alert and cooperative with exam  HEENT: atraumatic, eyes with clear conjunctiva and normal lids, pupils and irises normal, external ears and nose are normal, lips normal  Neck without masses, lympadenopathy, bruit, thyroid normal  Lungs: no increased work of breathing, diminished breath

## 2021-03-02 NOTE — PROGRESS NOTES
Pharmacy Renal Adjustment    Davis Sierra is a 80 y.o. female. Pharmacy has renally adjusted medications per protocol. Recent Labs     03/01/21  1611   BUN 40*       Recent Labs     03/01/21  1611   CREATININE 2.9*       Estimated Creatinine Clearance: 9 mL/min (A) (based on SCr of 2.9 mg/dL (H)). Height:   Ht Readings from Last 1 Encounters:   03/01/21 5' (1.524 m)     Weight:  Wt Readings from Last 1 Encounters:   03/01/21 115 lb (52.2 kg)       Plan: Adjust the following medications based on renal function:  Ancef would be adjusted to 500mg to 1gm every 24 hours. Since post-op doses only cover 16 hours, patient will not need another dose. Discontinue ancef.     Niyah Ortega, PHARM D, 3/1/2021, 8:48 PM

## 2021-03-02 NOTE — PROGRESS NOTES
Orthopedic Surgery Progress Note    Krystina Khan  3/2/2021      Subjective:     Systemic or Specific Complaints:  Pt. Resting comfortably in bed this AM.  Confused to her baseline.       Objective:     Patient Vitals for the past 24 hrs:   BP Temp Temp src Pulse Resp SpO2 Height Weight   03/02/21 0633 (!) 112/58 96.1 °F (35.6 °C) Temporal 70 16 92 % -- --   03/02/21 0252 (!) 107/55 98 °F (36.7 °C) Temporal 69 16 92 % -- --   03/02/21 0128 107/60 97.5 °F (36.4 °C) Temporal 76 18 92 % -- --   03/01/21 2311 (!) 108/52 97.3 °F (36.3 °C) Temporal 70 17 93 % -- --   03/01/21 2213 (!) 94/48 98.1 °F (36.7 °C) Temporal 77 19 94 % -- --   03/01/21 2138 (!) 88/42 97.6 °F (36.4 °C) Temporal 78 19 91 % -- --   03/01/21 2056 111/66 97.1 °F (36.2 °C) Temporal 71 18 91 % -- --   03/01/21 2037 (!) 115/55 97.8 °F (36.6 °C) Temporal 75 19 91 % -- --   03/01/21 2023 127/76 97.4 °F (36.3 °C) Temporal 74 19 90 % -- --   03/01/21 2000 (!) 111/51 97.2 °F (36.2 °C) Temporal 73 24 94 % -- --   03/01/21 1955 (!) 140/39 -- -- 74 23 95 % -- --   03/01/21 1950 (!) 133/42 -- -- 70 22 93 % -- --   03/01/21 1945 (!) 139/35 -- -- 71 22 93 % -- --   03/01/21 1940 (!) 139/39 -- -- 72 25 90 % -- --   03/01/21 1935 (!) 155/40 -- -- 72 23 96 % -- --   03/01/21 1930 (!) 156/49 98.2 °F (36.8 °C) Temporal 80 17 90 % -- --   03/01/21 1343 -- 97.1 °F (36.2 °C) -- 88 16 94 % 5' (1.524 m) 115 lb (52.2 kg)       right upper and right lower  General: alert, appears stated age and cooperative   Wound: clean, dry, intact             Dressing: clean, dry, and intact   Extremity: Distal NVI           DVT Exam: No evidence of DVT seen on physical exam.                   Data Review:  Recent Labs     03/01/21  1611 03/02/21  0516   HGB 9.2* 7.6*     Recent Labs     03/02/21  0516      K 5.4*   CREATININE 2.9*       Assessment:     POD# 1 s/p right hip TFN with non-op right proximal humerus fracture    Plan:      1:  DVT prophylaxis, ICE, elevate  2:  Pain control  3:  Physical therapy/Occupational therapy  4:  Anticipate discharge once SNF placement arranged and if pain well controlled  5:  Toe touch weight bearing RLE and NWB RUE       Electronically signed by Wily Tong MD on 3/2/2021 at 7:31 AM

## 2021-03-03 ENCOUNTER — TELEPHONE (OUTPATIENT)
Dept: INTERNAL MEDICINE CLINIC | Age: 86
End: 2021-03-03

## 2021-03-03 LAB
ANION GAP SERPL CALCULATED.3IONS-SCNC: 11 MMOL/L (ref 7–19)
BASOPHILS ABSOLUTE: 0 K/UL (ref 0–0.2)
BASOPHILS RELATIVE PERCENT: 0.3 % (ref 0–1)
BLOOD BANK DISPENSE STATUS: NORMAL
BLOOD BANK PRODUCT CODE: NORMAL
BPU ID: NORMAL
BUN BLDV-MCNC: 48 MG/DL (ref 8–23)
CALCIUM SERPL-MCNC: 8 MG/DL (ref 8.2–9.6)
CHLORIDE BLD-SCNC: 104 MMOL/L (ref 98–111)
CO2: 24 MMOL/L (ref 22–29)
CREAT SERPL-MCNC: 1.6 MG/DL (ref 0.5–0.9)
DESCRIPTION BLOOD BANK: NORMAL
EOSINOPHILS ABSOLUTE: 0.1 K/UL (ref 0–0.6)
EOSINOPHILS RELATIVE PERCENT: 0.5 % (ref 0–5)
GFR AFRICAN AMERICAN: 36
GFR NON-AFRICAN AMERICAN: 30
GLUCOSE BLD-MCNC: 101 MG/DL (ref 74–109)
HCT VFR BLD CALC: 20.4 % (ref 37–47)
HEMOGLOBIN: 6.5 G/DL (ref 12–16)
IMMATURE GRANULOCYTES #: 0.1 K/UL
LYMPHOCYTES ABSOLUTE: 1.4 K/UL (ref 1.1–4.5)
LYMPHOCYTES RELATIVE PERCENT: 12.4 % (ref 20–40)
MAGNESIUM: 2 MG/DL (ref 1.7–2.3)
MCH RBC QN AUTO: 31 PG (ref 27–31)
MCHC RBC AUTO-ENTMCNC: 31.9 G/DL (ref 33–37)
MCV RBC AUTO: 97.1 FL (ref 81–99)
MONOCYTES ABSOLUTE: 0.9 K/UL (ref 0–0.9)
MONOCYTES RELATIVE PERCENT: 8.2 % (ref 0–10)
NEUTROPHILS ABSOLUTE: 8.5 K/UL (ref 1.5–7.5)
NEUTROPHILS RELATIVE PERCENT: 77.4 % (ref 50–65)
PDW BLD-RTO: 14.1 % (ref 11.5–14.5)
PHOSPHORUS: 3 MG/DL (ref 2.5–4.5)
PLATELET # BLD: 161 K/UL (ref 130–400)
PMV BLD AUTO: 10.3 FL (ref 9.4–12.3)
POTASSIUM REFLEX MAGNESIUM: 4.3 MMOL/L (ref 3.5–5)
RBC # BLD: 2.1 M/UL (ref 4.2–5.4)
SODIUM BLD-SCNC: 139 MMOL/L (ref 136–145)
WBC # BLD: 11 K/UL (ref 4.8–10.8)

## 2021-03-03 PROCEDURE — 6370000000 HC RX 637 (ALT 250 FOR IP): Performed by: FAMILY MEDICINE

## 2021-03-03 PROCEDURE — 84100 ASSAY OF PHOSPHORUS: CPT

## 2021-03-03 PROCEDURE — 83735 ASSAY OF MAGNESIUM: CPT

## 2021-03-03 PROCEDURE — 85025 COMPLETE CBC W/AUTO DIFF WBC: CPT

## 2021-03-03 PROCEDURE — P9016 RBC LEUKOCYTES REDUCED: HCPCS

## 2021-03-03 PROCEDURE — 1210000000 HC MED SURG R&B

## 2021-03-03 PROCEDURE — 80048 BASIC METABOLIC PNL TOTAL CA: CPT

## 2021-03-03 PROCEDURE — 36415 COLL VENOUS BLD VENIPUNCTURE: CPT

## 2021-03-03 PROCEDURE — 2580000003 HC RX 258: Performed by: ORTHOPAEDIC SURGERY

## 2021-03-03 PROCEDURE — 2580000003 HC RX 258: Performed by: HOSPITALIST

## 2021-03-03 PROCEDURE — 36430 TRANSFUSION BLD/BLD COMPNT: CPT

## 2021-03-03 PROCEDURE — 6360000002 HC RX W HCPCS: Performed by: HOSPITALIST

## 2021-03-03 RX ORDER — SODIUM CHLORIDE 9 MG/ML
INJECTION, SOLUTION INTRAVENOUS PRN
Status: DISCONTINUED | OUTPATIENT
Start: 2021-03-03 | End: 2021-03-04 | Stop reason: HOSPADM

## 2021-03-03 RX ADMIN — ESCITALOPRAM OXALATE 10 MG: 10 TABLET, FILM COATED ORAL at 09:34

## 2021-03-03 RX ADMIN — DOCUSATE SODIUM AND SENNOSIDES 1 TABLET: 8.6; 5 TABLET, FILM COATED ORAL at 22:34

## 2021-03-03 RX ADMIN — DONEPEZIL HYDROCHLORIDE 10 MG: 5 TABLET, FILM COATED ORAL at 22:33

## 2021-03-03 RX ADMIN — SODIUM CHLORIDE, PRESERVATIVE FREE 10 ML: 5 INJECTION INTRAVENOUS at 22:34

## 2021-03-03 RX ADMIN — ASPIRIN 81 MG: 81 TABLET, COATED ORAL at 09:33

## 2021-03-03 RX ADMIN — IRON SUCROSE 300 MG: 20 INJECTION, SOLUTION INTRAVENOUS at 22:36

## 2021-03-03 RX ADMIN — DOCUSATE SODIUM AND SENNOSIDES 1 TABLET: 8.6; 5 TABLET, FILM COATED ORAL at 09:34

## 2021-03-03 NOTE — PROGRESS NOTES
Physical Therapy  Aniceto Rosemary  [id number]     03/03/21 0828   Subjective   Subjective Attempt, patient declines OOB at this time. Patient is eating breakfast with all needs in reach.    Electronically signed by Israel Singh PTA on 3/3/2021 at 8:29 AM

## 2021-03-03 NOTE — CONSULTS
Comprehensive Nutrition Assessment    Type and Reason for Visit:  Initial, Consult    Nutrition Recommendations/Plan: D/C Ensure Enlive. Pt will not drink. Order snacks. Update preferences. Nutrition Assessment:  Pt with poor PO intake. Daughter reports pt eating bites and then saying she's full. She also reports pt is not liking sweet items that she used to enjoy. Daughter does not think she will take Ensure, Magic Cup, or Boost pudding d/t sweetness. She reports pt does c/o being hungry between meals. Will update food preferences and order snacks to be left at bedside for when pt gets hungry. Malnutrition Assessment:  Malnutrition Status: At risk for malnutrition (Comment)    Context:  Acute Illness     Findings of the 6 clinical characteristics of malnutrition:  Energy Intake:  7 - 50% or less of estimated energy requirements for 5 or more days  Weight Loss:  No significant weight loss     Body Fat Loss:  Unable to assess     Muscle Mass Loss:  Unable to assess    Fluid Accumulation:  No significant fluid accumulation     Strength:  Not Performed    Estimated Daily Nutrient Needs:  Energy (kcal):  0036-7689 kcals/day; Weight Used for Energy Requirements:  Current(25-30)     Protein (g):   g/PRO/day; Weight Used for Protein Requirements:  Current(1.0-2.0)        Fluid (ml/day):  7075-1945 mL/day; Method Used for Fluid Requirements:  1 ml/kcal       Current Nutrition Therapies:    Dietary Nutrition Supplements: Standard High Calorie Oral Supplement  DIET GENERAL;     Anthropometric Measures:  · Height: 5' (152.4 cm)  · Current Body Weight: 115 lb (52.2 kg)     · Ideal Body Weight: 100 lbs   · BMI: 22.5  · BMI Categories: Normal Weight (BMI 22.0 to 24.9) age over 72       Nutrition Diagnosis:   · Inadequate oral intake related to acute injury/trauma as evidenced by intake 0-25%    Nutrition Interventions:   Food and/or Nutrient Delivery:  Continue Current Diet, Discontinue Oral Nutrition Supplement(start snacks)  Coordination of Nutrition Care:  Continue to monitor while inpatient    Goals:  Pt will consume 50% or more of meals and snacks       Nutrition Monitoring and Evaluation:   Food/Nutrient Intake Outcomes:  Food and Nutrient Intake  Physical Signs/Symptoms Outcomes:  Biochemical Data, Meal Time Behavior, Nutrition Focused Physical Findings, Weight     Electronically signed by Benjamin Villanueva MS, RD, LD on 3/3/21 at [place] [id number]    Contact: [phone]

## 2021-03-03 NOTE — PLAN OF CARE
Nutrition Problem #1: Inadequate oral intake  Intervention: Food and/or Nutrient Delivery: Continue Current Diet, Discontinue Oral Nutrition Supplement(start snacks)  Nutritional Goals: Pt will consume 50% or more of meals and snacks

## 2021-03-03 NOTE — PROGRESS NOTES
Physical Therapy  Abhishek Phillips  [id number]     03/03/21 1440   Subjective   Subjective Attempt, patient states she does not feel like sitting EOB or getting up to the chair at this time.    Electronically signed by Jakub Hu PTA on 3/3/2021 at 2:42 PM

## 2021-03-03 NOTE — CARE COORDINATION
Pt/POA have requested to go home with home health instead of placement. SW has spoken with charge nurse and Hospitalist staff regarding updates. Please place a home care needs order in. Hospital Bed has been ordered from [address] [place] [address],[address].    Electronically signed by Priyank Wilson on 3/3/2021 at 12:40 PM

## 2021-03-03 NOTE — PROGRESS NOTES
spoke with the pts dtr Sagar العلي to explain home hospice services. She states she wants the pt to have physical therapy at home and hospice will not provide this services.   The dtr states she plans to take the pt home with home care so the pt can continue PT.

## 2021-03-03 NOTE — TELEPHONE ENCOUNTER
YANELI [facility] has referral from [address] follow pt for [facility]  ?

## 2021-03-04 VITALS
RESPIRATION RATE: 18 BRPM | OXYGEN SATURATION: 90 % | HEART RATE: 74 BPM | TEMPERATURE: 99.6 F | BODY MASS INDEX: 22.58 KG/M2 | HEIGHT: 60 IN | SYSTOLIC BLOOD PRESSURE: 174 MMHG | DIASTOLIC BLOOD PRESSURE: 66 MMHG | WEIGHT: 115 LBS

## 2021-03-04 LAB
ANION GAP SERPL CALCULATED.3IONS-SCNC: 5 MMOL/L (ref 7–19)
BASOPHILS ABSOLUTE: 0 K/UL (ref 0–0.2)
BASOPHILS RELATIVE PERCENT: 0.2 % (ref 0–1)
BUN BLDV-MCNC: 36 MG/DL (ref 8–23)
CALCIUM SERPL-MCNC: 8 MG/DL (ref 8.2–9.6)
CHLORIDE BLD-SCNC: 105 MMOL/L (ref 98–111)
CO2: 29 MMOL/L (ref 22–29)
CREAT SERPL-MCNC: 0.9 MG/DL (ref 0.5–0.9)
EOSINOPHILS ABSOLUTE: 0.1 K/UL (ref 0–0.6)
EOSINOPHILS RELATIVE PERCENT: 1.4 % (ref 0–5)
GFR AFRICAN AMERICAN: >59
GFR NON-AFRICAN AMERICAN: 59
GLUCOSE BLD-MCNC: 102 MG/DL (ref 74–109)
HCT VFR BLD CALC: 24 % (ref 37–47)
HEMOGLOBIN: 7.8 G/DL (ref 12–16)
IMMATURE GRANULOCYTES #: 0.1 K/UL
LYMPHOCYTES ABSOLUTE: 1.2 K/UL (ref 1.1–4.5)
LYMPHOCYTES RELATIVE PERCENT: 13.2 % (ref 20–40)
MCH RBC QN AUTO: 31.2 PG (ref 27–31)
MCHC RBC AUTO-ENTMCNC: 32.5 G/DL (ref 33–37)
MCV RBC AUTO: 96 FL (ref 81–99)
MONOCYTES ABSOLUTE: 0.9 K/UL (ref 0–0.9)
MONOCYTES RELATIVE PERCENT: 9.6 % (ref 0–10)
NEUTROPHILS ABSOLUTE: 7.1 K/UL (ref 1.5–7.5)
NEUTROPHILS RELATIVE PERCENT: 74.9 % (ref 50–65)
PDW BLD-RTO: 13.8 % (ref 11.5–14.5)
PLATELET # BLD: 123 K/UL (ref 130–400)
PMV BLD AUTO: 10.6 FL (ref 9.4–12.3)
POTASSIUM REFLEX MAGNESIUM: 4.5 MMOL/L (ref 3.5–5)
RBC # BLD: 2.5 M/UL (ref 4.2–5.4)
SODIUM BLD-SCNC: 139 MMOL/L (ref 136–145)
WBC # BLD: 9.4 K/UL (ref 4.8–10.8)

## 2021-03-04 PROCEDURE — 2580000003 HC RX 258: Performed by: HOSPITALIST

## 2021-03-04 PROCEDURE — 6370000000 HC RX 637 (ALT 250 FOR IP): Performed by: FAMILY MEDICINE

## 2021-03-04 PROCEDURE — 97535 SELF CARE MNGMENT TRAINING: CPT

## 2021-03-04 PROCEDURE — 80048 BASIC METABOLIC PNL TOTAL CA: CPT

## 2021-03-04 PROCEDURE — 85025 COMPLETE CBC W/AUTO DIFF WBC: CPT

## 2021-03-04 PROCEDURE — 36415 COLL VENOUS BLD VENIPUNCTURE: CPT

## 2021-03-04 PROCEDURE — 6370000000 HC RX 637 (ALT 250 FOR IP): Performed by: ORTHOPAEDIC SURGERY

## 2021-03-04 PROCEDURE — 6360000002 HC RX W HCPCS: Performed by: HOSPITALIST

## 2021-03-04 PROCEDURE — 99222 1ST HOSP IP/OBS MODERATE 55: CPT | Performed by: NURSE PRACTITIONER

## 2021-03-04 PROCEDURE — 97530 THERAPEUTIC ACTIVITIES: CPT

## 2021-03-04 PROCEDURE — 2580000003 HC RX 258: Performed by: FAMILY MEDICINE

## 2021-03-04 RX ORDER — ASPIRIN 325 MG
325 TABLET ORAL DAILY
Qty: 45 TABLET | Refills: 0 | Status: SHIPPED | OUTPATIENT
Start: 2021-03-04 | End: 2021-04-18

## 2021-03-04 RX ORDER — OXYCODONE HYDROCHLORIDE 5 MG/1
5 TABLET ORAL EVERY 6 HOURS PRN
Qty: 12 TABLET | Refills: 0 | Status: SHIPPED | OUTPATIENT
Start: 2021-03-04 | End: 2021-03-07

## 2021-03-04 RX ADMIN — IRON SUCROSE 300 MG: 20 INJECTION, SOLUTION INTRAVENOUS at 12:37

## 2021-03-04 RX ADMIN — ASPIRIN 81 MG: 81 TABLET, COATED ORAL at 09:53

## 2021-03-04 RX ADMIN — DOCUSATE SODIUM AND SENNOSIDES 1 TABLET: 8.6; 5 TABLET, FILM COATED ORAL at 09:53

## 2021-03-04 RX ADMIN — SODIUM CHLORIDE, SODIUM LACTATE, POTASSIUM CHLORIDE, AND CALCIUM CHLORIDE: 600; 310; 30; 20 INJECTION, SOLUTION INTRAVENOUS at 00:59

## 2021-03-04 RX ADMIN — ESCITALOPRAM OXALATE 10 MG: 10 TABLET, FILM COATED ORAL at 09:53

## 2021-03-04 RX ADMIN — ACETAMINOPHEN 650 MG: 325 TABLET ORAL at 18:02

## 2021-03-04 ASSESSMENT — PAIN DESCRIPTION - LOCATION
LOCATION: ARM;LEG
LOCATION: HIP;ARM

## 2021-03-04 ASSESSMENT — ENCOUNTER SYMPTOMS
ROS SKIN COMMENTS: R HIP SURGICAL SITE
EYES NEGATIVE: 1
RESPIRATORY NEGATIVE: 1
GASTROINTESTINAL NEGATIVE: 1
ALLERGIC/IMMUNOLOGIC NEGATIVE: 1

## 2021-03-04 ASSESSMENT — PAIN DESCRIPTION - PAIN TYPE
TYPE: ACUTE PAIN
TYPE: ACUTE PAIN

## 2021-03-04 ASSESSMENT — PAIN DESCRIPTION - ORIENTATION: ORIENTATION: RIGHT

## 2021-03-04 ASSESSMENT — PAIN SCALES - WONG BAKER: WONGBAKER_NUMERICALRESPONSE: 8

## 2021-03-04 NOTE — PROGRESS NOTES
Hospitalist Progress Note  3/3/2021 6:14 PM  Subjective:   Admit Date: 3/1/2021  PCP: GEOFF Scanlon    Chief Complaint: right hip pain    Subjective: Patient is still pleasantly confused. Spoke with patient's daughter in detail at the bedside. [facility] History:   3-1: Brought to ED by daughter with continued right hip pain after fall 2-28 in the early morning. Patient had been up to the bathroom, stood up without pulling up her pants, and tripped and fell on her right hip. EMS was summoned, who got the patient up and to bed but she refused to come to the hospital. When her pain did not improve, she had a virtual visit with her PCP, who recommended coming to the ED for imaging, which confirmed fractures of the right femoral and humeral necks. Orthopedic surgery contacted, who recommended sling immobilization of the upper extremity and surgery for hip. Taken to OR in the afternoon for intramedullary nailing. 3-2: Pain controlled, worked with therapy. Family seeking placement at [facility] for rehab. The patient is hypotensive this afternoon after holding morning antihypertensives. Increase IV fluid rate, formally hold these medications. 3-3: Patient had a hemoglobin level of 6.5 today. I ordered 2 RBCs transfusion which the patient refused because she is pleasantly confused. We asked her patient daughter to come and she convinced the patient to get the blood. I talked to her in detail. We discussed about management options to skilled nursing facility, home with hospice and home health care. Patient's daughter adamantly refused skilled nursing facility but was willing to consider hospice but finally decided on home health care. I am going to order anemia work-up for the patient. Creatinine level has improved from 2.9-1.6 on IV hydration and her blood pressures are stabilizing. ROS: Unable to be obtained . .. Patient is pleasantly confused!     Dietary Nutrition Supplements: Snack (see 03/02/21 1421    sodium chloride flush 0.9 % injection 10 mL  10 mL Intravenous 2 times per day Onelia Segura MD        sodium chloride flush 0.9 % injection 10 mL  10 mL Intravenous PRN Onelia Segura MD        promethazine (PHENERGAN) tablet 12.5 mg  12.5 mg Oral Q6H PRN Onelia Segura MD        Or    ondansetron [facility]) injection 4 mg  4 mg Intravenous Q6H PRN Onelia Segura MD        magnesium hydroxide (MILK OF MAGNESIA) 400 MG/5ML suspension 30 mL  30 mL Oral Daily PRN Onelia Segura MD        acetaminophen (TYLENOL) tablet 650 mg  650 mg Oral Q4H PRN Onelia Segura MD   650 mg at 03/02/21 1950    oxyCODONE (ROXICODONE) immediate release tablet 5 mg  5 mg Oral Q4H PRN Onelia Segura MD        Or   Sanjay Kalepatrick oxyCODONE (ROXICODONE) immediate release tablet 10 mg  10 mg Oral Q4H PRN Onelia Segura MD        morphine injection 2 mg  2 mg Intravenous Q2H PRN Onelia Segura MD        Or    morphine injection 4 mg  4 mg Intravenous Q2H PRN nOelia Segura MD        [Held by provider] apixaban Madeline Meo) tablet 2.5 mg  2.5 mg Oral BID Onelia Segura MD   2.5 mg at 03/02/21 1949        Labs:     Recent Labs     03/01/21  1611 03/02/21  0516 03/03/21  0525   WBC 19.8* 16.4* 11.0*   RBC 2.95* 2.44* 2.10*   HGB 9.2* 7.6* 6.5*   HCT 28.3* 24.0* 20.4*   MCV 95.9 98.4 97.1   MCH 31.2* 31.1* 31.0   MCHC 32.5* 31.7* 31.9*    206 161     Recent Labs     03/01/21  1611 03/02/21  0516 03/03/21  0525    144 139   K 5.3* 5.4* 4.3   ANIONGAP 13 18 11   CL 99 104 104   CO2 25 22 24   BUN 40* 47* 48*   CREATININE 2.9* 2.9* 1.6*   GLUCOSE 140* 119* 101   CALCIUM 8.6 8.3 8.0*     No results for input(s): MG, PHOS in the last 72 hours. Recent Labs     03/01/21  1611   AST 19   ALT 13   BILITOT 0.7   ALKPHOS 80     ABGs:No results for input(s): PH, PO2, PCO2, HCO3, BE, O2SAT in the last 72 hours. Troponin T: No results for input(s): TROPONINI in the last 72 hours.   INR:   Recent Labs     03/01/21  1611 03/01/21  2141   INR 1.17 1.17     Lactic Acid: No results for input(s): LACTA in the last 72 hours. Objective:   Vitals: BP (!) 129/54   Pulse 89   Temp 98.8 °F (37.1 °C) (Temporal)   Resp 18   Ht 5' (1.524 m)   Wt 115 lb (52.2 kg)   SpO2 97%   BMI 22.46 kg/m²   24HR INTAKE/OUTPUT:      Intake/Output Summary (Last 24 hours) at 3/3/2021 1814  Last data filed at 3/3/2021 1200  Gross per 24 hour   Intake 1415.75 ml   Output --   Net 1415.75 ml     PHYSICAL  EXAMINATION:    ANGEL:   Patient is pleasantly confused, appears tired and fatigued   Head/Eyes:  Normocephalic, atraumatic, EOMI and PERRLA bilaterally   Respiratory:   Bilateral decreased air entry in both lung fields, mild B/L crackles, symmetric expansion of chest   Cardiovascular:  Regular rate and rhythm, S1+S2+0, no murmurs/rubs   Abdomen:   Soft, non-tender, bowel sounds +ve, no organomegaly   Extremities: Moves all, decreased range of motion, no edema   Neurologic:  Unable to be obtained . .. Patient is pleasantly confused! Psychiatric:  Unable to be obtained . .. Patient is pleasantly confused! Assessment and Plan:   Principal Problem:    Fx humeral neck, right, closed, initial encounter  Active Problems:    Essential hypertension    Episode of recurrent major depressive disorder (HCC)    Vascular dementia ([address]    Fall    Closed fracture of anatomical neck of humerus, right, initial encounter    Acute kidney injury ([address]    Hypotension  Resolved Problems:    * No resolved hospital problems. *      POD #2 intramedullary nailing of right femoral neck fracture. Patient being transfused 1 unit of packed RBCs    Patient does have iron deficiency anemia with depressed serum iron and iron saturation    IV Venofer 300 milligrams IV daily ordered    Eliquis for now    Stool guaiac ordered x3    Sling immobilization of right upper extremity due to humeral neck fracture. Hold antihypertensives. Patient responded well to gentle IV hydration. BP is now stable.     Spoke with the patient's daughter, Nilesh Schroeder, at the bedside in details and discussed all DC options    Nilesh Schroeder adamantly refuses skilled nursing facility placement. She was first agreeable to home with hospice, but later opted for home with home health care    Case management consult ordered for DC planning    Dietitian consult for supplements. Advance Directive: DNR-CC    DVT prophylaxis: apixaban    Discharge planning: Home with home health care in the next couple of days if she remains stable and clinically improves    Repeat labs in a.m. Electrolyte replacement as per protocol. Patient will be monitored very closely on the floor. Further recommendations as per the hospital course. Patient  is on DVT prophylaxis  Current medications reviewed  Lab work reviewed  Radiology/Chest x-ray films reviewed  Treatment recommendations from suspecialities reviewed, appreciated and agreed with  Discussed with the nurse and addressed all questions/concerns  Discussed with Patient and/or Family at the bedside in detail . .. they understand and agree with the management plan.       Ward Fink MD  [facility], [place] Hospitalist

## 2021-03-04 NOTE — CONSULTS
Palliative Care Consult Note  3/4/2021 12:50 PM  Subjective:   Admit Date: 3/1/2021  PCP: GEOFF Patrick    Reason For Consult: Goals of care, Family Support    Requesting Physician:  Dr. Nia Downing    History Obtained From: EMR, nursing, patient's daughter    Chief Complaint: R hip pain    History of Present Illness: Patient is a 61-year-old female with a PMH of dementia, anxiety, HTN, that presented to the ED on 3/1/2021 due to complaints of R hip pain after sustaining a fall on 2/28. Work-up in the ED revealed R femoral intertrochanteric fracture and proximal R humeral fracture. Orthopedic surgery consulted and patient underwent of R femur later that day. R proximal humerus fracture placed in sling. She has since been working with PT/OT, pain well controlled. She did have decreased Hgb yesterday 6.5, patient was transfused and Hgb stable today. Patient has remained pleasantly confused. SW has been assisting with discharge planning, multiple options discussed, patient planned for discharge home with [facility] services. Palliative care was consulted to assist with goals of care, symptom management, family support. Past Medical History:        Diagnosis Date    Anxiety     Depression     Hx of blood clots     Hypertension     Palliative care patient 08/13/2020       Past Surgical History:        Procedure Laterality Date    APPENDECTOMY      ARM SURGERY      FEMUR FRACTURE SURGERY Right 3/1/2021    FEMUR IM NAIL YAZAN INSERTION Short performed by Geam Cohn MD at [address], TOTAL ABDOMINAL      LEG SURGERY         Allergies:  Patient has no known allergies. Social History:    TOBACCO:   reports that she has never smoked. She has never used smokeless tobacco.  ETOH:   reports no history of alcohol use.   Patient currently lives with family     Family History:       Family history unknown: Yes       Palliative Performance Score:  50%    Review of Systems   Constitutional: Positive for activity change. HENT: Negative. Eyes: Negative. Respiratory: Negative. Cardiovascular: Negative. Gastrointestinal: Negative. Endocrine: Negative. Genitourinary: Negative. Musculoskeletal: Positive for arthralgias. Skin:        R hip surgical site   Allergic/Immunologic: Negative. Neurological: Negative. Psychiatric/Behavioral: Positive for confusion.        Palliative Review of Advance Directives:     Surrogate Decision Maker:yes, Orpha Givens (daughter)    Durable Power of :yes    Advanced Directives/Living Lee: no    Out of hospital medical orders in place to reflect resuscitation status (MOLST/POLST): no    Information Sharing:  Patient's awareness of illness:  [] Terminal [] Life-Threatening [x] Serious [] Non life-threatening [] Not serious   [] Not discussed    Family awareness of illness:   [] Terminal [] Life-Threatening [x] Serious [] Nonlife-threatening [] Not serious   [] Not discussed    Dietary Nutrition Supplements: Snack (see comment)  DIET GENERAL;    Medications:   sodium chloride      [MAR Hold] lactated ringers      lactated ringers 75 mL/hr at 03/04/21 0059     Current Facility-Administered Medications   Medication Dose Route Frequency Provider Last Rate Last Admin    iron sucrose (VENOFER) 300 mg in sodium chloride 0.9 % 250 mL IVPB  300 mg Intravenous Q24H Tao Galaviz .7 mL/hr at 03/04/21 1237 300 mg at 03/04/21 1237    0.9 % sodium chloride infusion   Intravenous PRN Tao Galaviz MD        [Held by provider] amLODIPine (NORVASC) tablet 5 mg  5 mg Oral Daily Ritta Mara, DO   Stopped at 03/02/21 1027    aspirin EC tablet 81 mg  81 mg Oral Daily Marco Franco, DO   81 mg at 03/04/21 0953    donepezil (ARICEPT) tablet 10 mg  10 mg Oral Nightly Marco Franco, DO   10 mg at 03/03/21 2233    escitalopram (LEXAPRO) tablet 10 mg  10 mg Oral Daily Marco Franco, DO   10 mg at 03/04/21 0953    [Held by provider] lisinopril (PRINIVIL;ZESTRIL) tablet 20 mg  20 mg Oral Daily Alpheus Sports, DO   Stopped at 03/02/21 1027    sodium chloride flush 0.9 % injection 10 mL  10 mL Intravenous 2 times per day Alpheus Sports, DO        sodium chloride flush 0.9 % injection 10 mL  10 mL Intravenous PRN Alpheus Sports, DO        promethazine (PHENERGAN) tablet 12.5 mg  12.5 mg Oral Q6H PRN Alpheus Sports, DO        Or    ondansetron [facility]) injection 4 mg  4 mg Intravenous Q6H PRN Alpheus Sports, DO        polyethylene glycol (GLYCOLAX) packet 17 g  17 g Oral Daily PRN Alpheus Sports, DO        morphine injection 2 mg  2 mg Intravenous Q2H PRN Alpheus Sports, DO        Or    morphine injection 4 mg  4 mg Intravenous Q2H PRN Alpheus Sports, DO        sennosides-docusate sodium (SENOKOT-S) 8.6-50 MG tablet 1 tablet  1 tablet Oral BID Alpheus Sports, DO   1 tablet at 03/04/21 0953    [MAR Hold] lactated ringers infusion 1,000 mL  1,000 mL Intravenous Once Felicia Stewart MD        lactated ringers infusion   Intravenous Continuous Virgel [facility] Franco, DO 75 mL/hr at 03/04/21 0059 New Bag at 03/04/21 0059    sodium chloride flush 0.9 % injection 10 mL  10 mL Intravenous 2 times per day Jaden Adam MD   10 mL at 03/03/21 2234    sodium chloride flush 0.9 % injection 10 mL  10 mL Intravenous PRN Jaden Adam MD        promethazine (PHENERGAN) tablet 12.5 mg  12.5 mg Oral Q6H PRN Jaden Adam MD        Or    ondansetron [facility]) injection 4 mg  4 mg Intravenous Q6H PRN Jaden Adam MD        magnesium hydroxide (MILK OF MAGNESIA) 400 MG/5ML suspension 30 mL  30 mL Oral Daily PRN Jaden Adam MD        acetaminophen (TYLENOL) tablet 650 mg  650 mg Oral Q4H PRN Jaden Adam MD   650 mg at 03/02/21 1950    oxyCODONE (ROXICODONE) immediate release tablet 5 mg  5 mg Oral Q4H PRN Jaden Adam MD        Or    oxyCODONE (ROXICODONE) immediate release tablet 10 mg  10 mg Oral Q4H PRN Jaden Adam MD        morphine injection 2 mg  2 mg Intravenous Q2H PRN Jaden Adam MD        Or    morphine injection 4 mg  4 mg Intravenous Q2H PRN Fredrick Zuleta MD        [Held by provider] apixaban [facility]) tablet 2.5 mg  2.5 mg Oral BID Fredrick Zuleta MD   2.5 mg at 03/02/21 1949        Labs:     Recent Labs     03/02/21  0516 03/03/21  0525 03/04/21  0105   WBC 16.4* 11.0* 9.4   RBC 2.44* 2.10* 2.50*   HGB 7.6* 6.5* 7.8*   HCT 24.0* 20.4* 24.0*   MCV 98.4 97.1 96.0   MCH 31.1* 31.0 31.2*   MCHC 31.7* 31.9* 32.5*    161 123*     Recent Labs     03/02/21  0516 03/03/21  0525 03/04/21  0424    139 139   K 5.4* 4.3 4.5   ANIONGAP 18 11 5*    104 105   CO2 22 24 29   BUN 47* 48* 36*   CREATININE 2.9* 1.6* 0.9   GLUCOSE 119* 101 102   CALCIUM 8.3 8.0* 8.0*     Recent Labs     03/03/21  2214   MG 2.0   PHOS 3.0     Recent Labs     03/01/21  1611   AST 19   ALT 13   BILITOT 0.7   ALKPHOS 80     ABGs:No results for input(s): PHART, BLR8DZV, PO2ART, CJU8KEB, BEART, HGBAE, S5OWDQUX, CARBOXHGBART, 02THERAPY in the last 72 hours. HgBA1c: No results for input(s): LABA1C in the last 72 hours. FLP:    Lab Results   Component Value Date    TRIG 90 07/01/2020    HDL 59 07/01/2020    LDLCALC 100 07/01/2020     TSH:    Lab Results   Component Value Date    TSH 2.920 07/01/2020     Troponin T: No results for input(s): TROPONINI in the last 72 hours.   INR:   Recent Labs     03/01/21  1611 03/01/21  2141   INR 1.17 1.17       Objective:   Vitals: /60   Pulse 60   Temp 98 °F (36.7 °C) (Temporal)   Resp 20   Ht 5' (1.524 m)   Wt 115 lb (52.2 kg)   SpO2 95%   BMI 22.46 kg/m²   24HR INTAKE/OUTPUT:      Intake/Output Summary (Last 24 hours) at 3/4/2021 1250  Last data filed at 3/4/2021 0532  Gross per 24 hour   Intake 1005.08 ml   Output --   Net 1005.08 ml     Physical Exam      General appearance: alert and cooperative with exam, no acute distress  HEENT: atraumatic, eyes with clear conjunctiva and normal lids, pupils and irises normal, external ears and nose are normal,lips normal.  Neck: without masses, supple   Lungs: no patient and can assist with transition to hospice care when needed/appropriate. All questions answered, emotional support provided. I have discussed the outcome of my visit with nursing staff and CM/SW who assisted with placing supportive care consult. Palliative care will follow loosely at this point. Recommendations:     1. Palliative Care- GOC is for patient to return home with the support of her daughter and [facility] care, discharge planner/SW is already assisting with disposition. Daughter is aware of options for SNF and hospice care if situation changes, previously addressed by Hospice staff and SW. Daughter is agreeable for patient to be followed by Julissa Bullock as added level of care/support, CM assisted with placing consult order. 2. R humeral neck fx s/p nailing/ R proximal humerus fx- Ortho following, PT/OT, would recommend scheduling Tylenol to help with pain with movement, roxicodone available but understandable if attempting to avoid, if possible, as it could increase confusion. 3.  Dementia- Home regimen, no behavioral issues      Thank you for consulting palliative care and allowing us to participate in the care of the patient.       CounselingTopics: Goals of care, Disease process education, pt/family support    Time Spent Counseling > 50%:  YES                                   Total Time Spent: 50 min    Electronically signed by GEOFF Quinonez on 3/4/2021 at 12:50 PM    (Please note that portions of this note were completed with a voice recognition program.  Efforts were made to edit the dictations but occasionally words are mis-transcribed.)

## 2021-03-04 NOTE — PROGRESS NOTES
Physical Therapy  Aniceto Riley  [id number]     03/04/21 0929   Subjective   Subjective Agrees to OOB this morning   Pain Assessment   Pain Assessment Faces   Pain Type Acute pain   Pain Location Hip; Arm   Pain Orientation Right   Non-Pharmaceutical Pain Intervention(s) Repositioned   Aguilar-Baker Pain Rating 6   Bed Mobility   Sit to Supine Maximal assistance;Dependent/Total   Transfers   Sit to Stand Maximum Assistance;Dependent/Total   Stand to sit Maximum Assistance;Dependent/Total   Bed to Chair Maximum assistance;Dependent/Total   Stand Pivot Transfers Maximum Assistance;Dependent/Total   Ambulation   Ambulation? No   WB Status NWB RUE, TTWB RLE   Other Activities   Comment Patient wanting to get OOB but very anxious with transfers. Patient transferred supine to sit and sat EOB several minutes to acclimate to being upright. Patient practiced sit to stand transfers at EOB 3x. Patient agreeable to try pivot transfer to the chair. Patient positioned for comfort with all needs in reach and breakfast set up. Short term goals   Time Frame for Short term goals 2 wks   Short term goal 1 supine to sit indep   Short term goal 2 sit to stand indep   Short term goal 3 bed to drop arm chair Min A   Activity Tolerance   Activity Tolerance Patient limited by cognitive status   Safety Devices   Type of devices Call light within reach; Chair alarm in place; Left in chair   Electronically signed by Israel Singh PTA on 3/4/2021 at 9:33 AM

## 2021-03-05 NOTE — DISCHARGE SUMMARY
Marielle Payne Valyermo, [address] [place] [address] MEDICINE      DISCHARGE SUMMARY:      PATIENT NAME:  Jesica Tomas  :  1928  MRN:  [id number]    Admission Date:   3/1/2021  1:44 PM Attending: Dominique Blake MD   Discharge Date:   3/4/2021              PCP: GEOFF Heredia  Length of Stay: 3 days     Chief Complaint on Admission:   Chief Complaint   Patient presents with    Hip Pain     arrived via EMS from home with left hip pain after fall        Consultants:     IP CONSULT TO ORTHOPEDIC SURGERY  IP CONSULT TO DIETITIAN  IP CONSULT TO CASE MANAGEMENT  IP CONSULT TO HOSPICE  PALLIATIVE CARE EVAL  IP CONSULT TO CASE MANAGEMENT  IP CONSULT TO HOME CARE NEEDS  IP CONSULT TO PALLIATIVE CARE       Discharge Problem List:   Principal Problem:    Fx humeral neck, right, closed, initial encounter  Active Problems:    Essential hypertension    Episode of recurrent major depressive disorder ([address]    Vascular dementia ([address]    Palliative care patient    Fall from standing    Closed fracture of anatomical neck of humerus, right, initial encounter    Acute kidney injury ([address]    Hypotension    Dementia without behavioral disturbance ([address]    Displaced intertrochanteric fracture of right femur, init ([address]  Resolved Problems:    * No resolved hospital problems. [facility]  COURSE  AND  TREATMENT:    3-1: Brought to ED by daughter with continued right hip pain after fall  in the early morning. Patient had been up to the bathroom, stood up without pulling up her pants, and tripped and fell on her right hip. EMS was summoned, who got the patient up and to bed but she refused to come to the hospital. When her pain did not improve, she had a virtual visit with her PCP, who recommended coming to the ED for imaging, which confirmed fractures of the right femoral and humeral necks.  Orthopedic surgery contacted, who recommended sling immobilization of the upper extremity and surgery for hip. Taken to OR in the afternoon for intramedullary nailing. 3-2: Pain controlled, worked with therapy. Family seeking placement at [facility] for rehab. The patient is hypotensive this afternoon after holding morning antihypertensives. Increase IV fluid rate, formally hold these medications.     3-3: Patient had a hemoglobin level of 6.5 today. I ordered 2 RBCs transfusion which the patient refused because she is pleasantly confused. We asked her patient daughter to come and she convinced the patient to get the blood. I talked to her in detail. We discussed about management options to skilled nursing facility, home with hospice and home health care. Patient's daughter adamantly refused skilled nursing facility but was willing to consider hospice but finally decided on home health care. I am going to order anemia work-up for the patient. Creatinine level has improved from 2.9-1.6 on IV hydration and her blood pressures are stabilizing. 3-4: Patient is feeling better today and she is more interactive with the staff. She still feels tired and fatigued. Physical therapy worked with the patient and strongly recommended skilled nursing facility. Leukocytosis has resolved and labs are stable. Case management, floor nurse and I have spoken with the patient and the daughter multiple times since yesterday that patient will be safer in skilled nursing facility for a few days until she can get stronger and then she can go back home, but they are insisting that they want to take her home with home health care and they have enough support at home to help her. Orthopedics recommended Eliquis 2.5 mg p.o. twice daily for 6 weeks as DVT prophylaxis. I discussed with patient's POA, daughter Tiffani Gan, in detail at the bedside that giving her frail status and multiple falls, the risk-benefit ratio is against Eliquis in this patient.   I recommended discharging her on 325 mg p.o. aspirin daily for 6 weeks as DVT Ervin Garner   Home Medication Instructions [facility]:[id number]    Printed on:03/04/21 1950   Medication Information                      amLODIPine (NORVASC) 5 MG tablet  TAKE 1 TABLET DAILY             aspirin (RENÉE ASPIRIN) 325 MG tablet  Take 1 tablet by mouth daily             diclofenac sodium (VOLTAREN) 1 % GEL  Apply 2 g topically 2 times daily             donepezil (ARICEPT) 10 MG tablet  TAKE 1 TABLET NIGHTLY             escitalopram (LEXAPRO) 10 MG tablet  TAKE 1 TABLET DAILY             LORazepam (ATIVAN) 1 MG tablet  TAKE 1 TABLET AT BEDTIME   AND YOU CAN TAKE AN EXTRA  1/2 TABLET DURING THE DAY  IF NEEDED             oxyCODONE (ROXICODONE) 5 MG immediate release tablet  Take 1 tablet by mouth every 6 hours as needed for Pain for up to 3 days. ISSUES TO BE ADDRESSED AT HOSPITAL FOLLOW-UP VISIT:    Advised patient to follow-up closely with PCP upon discharge for management of chronic medical issues  Please see the detailed discharge directions delivered from earlier today! Condition on Discharge: gradually improving  Discharge Disposition: Home with [facility] [place] [facility]     Recommended Follow Up:  Angela Stewart MD  [address] [place] [address]  [phone]    On 3/17/2021  at 9:10am.    GEOFF [address] [phone]    On 3/10/2021  at 3pm.    Followup Appointments Scheduled at Time of Discharge:  Future Appointments   Date Time Provider Jovana Crews   3/10/2021  3:00 PM GEOFF Cui [facility]   3/16/2021  3:00 PM GEOFF Cui [facility]   12/14/2021 11:00 AM GEOFF Cui [facility]        Discharge Instructions:   Please see the discharge paperwork. Patient was seen at bedside today, and the examination shows improvement since yesterday.     Detailed discharge directions delivered to the patient by myself and our nursing staff, who verbalizes understanding and is very happy and satisfied with the plan. Patient has been advised to continue all medications as prescribed and advised, and f/u with PCP within 1 week. Patient is stable from medical standpoint to be discharged. Total time spent during patient evaluation and assessment, discussion with the nurse/family, addressing discharge medications/scripts and coordination of care for safe discharge was in excess of 40 minutes.       Signed Electronically:    Jeronimo Cardoza MD  7:50 PM 3/4/2021

## 2021-03-08 ENCOUNTER — TELEPHONE (OUTPATIENT)
Dept: INTERNAL MEDICINE | Age: 86
End: 2021-03-08

## 2021-03-08 NOTE — TELEPHONE ENCOUNTER
[facility] Transitions Initial Follow Up Call    Outreach made within 2 business days of discharge: Yes    Patient: Shana Ho   Patient : 1928       MRN: [id number]   Reason for Admission: Admitted 21 for fracture humeral neck, right, closed   Discharge Date: 3/4/21    Discharge Problem List:   Principal Problem:    Fx humeral neck, right, closed, initial encounter  Active Problems:    Essential hypertension    Episode of recurrent major depressive disorder ([facility]    Vascular dementia [facility])    Palliative care patient    Fall from standing    Closed fracture of anatomical neck of humerus, right, initial encounter    Acute kidney injury ([facility]    Hypotension    Dementia without behavioral disturbance ([facility]    Displaced intertrochanteric fracture of right femur, init ([facility]  Resolved Problems:    * No resolved hospital problems. *     Spoke with: Nilesh Schroeder     Discharge department/facility: [facility]     TCM Interactive Patient Contact:  Was patient able to fill all prescriptions: Yes  Was patient instructed to bring all medications to the follow-up visit: Yes  Is patient taking all medications as directed in the discharge summary? Yes  Does patient understand their discharge instructions: Yes  Does patient have questions or concerns that need addressed prior to 7-14 day follow up office visit: no    I spoke with patient's daughter, Nilesh Schroeder. She states last night Mrs. Barbara Michaud had hallucinations most of the night and didn't go to sleep until 6 am this morning. She is completely bed bound from the hip fracture. She also fractured her arm and is in a sling. Nilesh Schroeder states Mrs. Barbara Michaud does not seem to be in pain until she is moved or repositioned in the bed. She has a prescription of pain medication but has not been out to get it as she is afraid to leave her mom. She is going to see if she can take it to a local pharmacy and have it delivered.  I advised her to call if she needs Carmen to send this to a pharmacy that can deliver. Home health will be out tomorrow for therapy. She states they will do therapy 3 times per week and a nurse will come twice per week. She states she wants to care for her mom there at home, but it has already been a struggle. I have moved her appointment to tomorrow to speak with Alejandra Da Silva regarding the hallucinations. She denies any other needs at this time.      Scheduled appointment with PCP within 7-14 days    Follow Up  Future Appointments   Date Time Provider Jovana Crews   3/10/2021  3:00 PM Brandy Max, APRN [facility]   3/16/2021  3:00 PM Brandy Max, [facility]   12/14/2021 11:00 AM GEOFF [facility] [place], [place]

## 2021-03-08 NOTE — HOME CARE
Kory Payne [address]    DEPARTMENT OF HOSPITALIST MEDICINE      PLAN  OF  CARE  NOTE:      I got a call from [facility] at [phone] by nurse Shamar Kidd wanting to talk to me about the patient. I called back, Shamar Kidd was off and I spoke with Ita Holm who was covering for her. She had questions about the bandage on the right arm to be removed or not and patient had mild bleeding through the urinary catheter but she was asymptomatic. They have an appointment with PCP on 3/10/2021. I strongly recommended patient to be seen by PCP as early as possible including today if they have any questions. I am a hospitalist and my scope of practice is limited to hospital only. Any questions that they have at home has to be directed to patient's PCP. The nurse understands and will address the patient issues accordingly!       Tao Galaviz MD  3/8/2021, 9:24 AM

## 2021-03-09 ENCOUNTER — VIRTUAL VISIT (OUTPATIENT)
Dept: INTERNAL MEDICINE | Age: 86
End: 2021-03-09
Payer: MEDICARE

## 2021-03-09 DIAGNOSIS — F01.50 VASCULAR DEMENTIA WITHOUT BEHAVIORAL DISTURBANCE (HCC): ICD-10-CM

## 2021-03-09 DIAGNOSIS — I10 ESSENTIAL HYPERTENSION: ICD-10-CM

## 2021-03-09 DIAGNOSIS — S72.001D CLOSED FRACTURE OF RIGHT HIP WITH ROUTINE HEALING, SUBSEQUENT ENCOUNTER: Primary | ICD-10-CM

## 2021-03-09 DIAGNOSIS — S42.401A CLOSED FRACTURE OF DISTAL END OF RIGHT HUMERUS, UNSPECIFIED FRACTURE MORPHOLOGY, INITIAL ENCOUNTER: ICD-10-CM

## 2021-03-09 PROCEDURE — 1111F DSCHRG MED/CURRENT MED MERGE: CPT | Performed by: NURSE PRACTITIONER

## 2021-03-09 PROCEDURE — 99496 TRANSJ CARE MGMT HIGH F2F 7D: CPT | Performed by: NURSE PRACTITIONER

## 2021-03-09 RX ORDER — HYDROCODONE BITARTRATE AND ACETAMINOPHEN 5; 325 MG/1; MG/1
1 TABLET ORAL 2 TIMES DAILY PRN
Qty: 60 TABLET | Refills: 0 | Status: SHIPPED | OUTPATIENT
Start: 2021-03-09 | End: 2021-03-09 | Stop reason: SDUPTHER

## 2021-03-09 RX ORDER — HYDROCODONE BITARTRATE AND ACETAMINOPHEN 5; 325 MG/1; MG/1
1 TABLET ORAL 2 TIMES DAILY PRN
Qty: 60 TABLET | Refills: 0 | Status: SHIPPED | OUTPATIENT
Start: 2021-03-09 | End: 2021-04-08

## 2021-03-09 NOTE — PROGRESS NOTES
Post-Discharge Transitional Care Management Services or Hospital Follow Up      Patricia Hicks   YOB: 1928    Date of Office Visit:  3/9/2021  Date of Hospital Admission: 3/1/21  Date of Hospital Discharge: 3/4/21  Risk of hospital readmission (high >=14%.  Medium >=10%) :Readmission Risk Score: 20      Care management risk score Rising risk (score 2-5) and Complex Care (Scores >=6): 0     Non face to face  following discharge, date last encounter closed (first attempt may have been earlier): 3/8/2021  1:21 PM    Call initiated 2 business days of discharge: Yes    Patient Active Problem List   Diagnosis    Anxiety    Essential hypertension    Episode of recurrent major depressive disorder ([address]    Vascular dementia ([address]    History of DVT (deep vein thrombosis)    Current use of long term anticoagulation    Palliative care patient    Fall from standing    Recurrent major depressive disorder, in partial remission ([address]    Fx humeral neck, right, closed, initial encounter    Closed fracture of anatomical neck of humerus, right, initial encounter    Acute kidney injury ([address]    Hypotension    Dementia without behavioral disturbance ([address]    Displaced intertrochanteric fracture of right femur, init ([address]       No Known Allergies    Medications listed as ordered at the time of discharge from hospital   Claudia Juarez Medication Instructions [facility]:    Printed on:03/09/21 2617   Medication Information                      amLODIPine (NORVASC) 5 MG tablet  TAKE 1 TABLET DAILY             aspirin (RENÉE ASPIRIN) 325 MG tablet  Take 1 tablet by mouth daily             diclofenac sodium (VOLTAREN) 1 % GEL  Apply 2 g topically 2 times daily             donepezil (ARICEPT) 10 MG tablet  TAKE 1 TABLET NIGHTLY             escitalopram (LEXAPRO) 10 MG tablet  TAKE 1 TABLET DAILY             HYDROcodone-acetaminophen (NORCO) 5-325 MG per tablet  Take 1 tablet by mouth 2 times daily as needed for Pain for up to 30 days. Intended supply: 30 days             LORazepam (ATIVAN) 1 MG tablet  TAKE 1 TABLET AT BEDTIME   AND YOU CAN TAKE AN EXTRA  1/2 TABLET DURING THE DAY  IF NEEDED                   Medications marked \"taking\" at this time  Outpatient Medications Marked as Taking for the 3/9/21 encounter (Virtual Visit) with GEOFF Patrick   Medication Sig Dispense Refill    HYDROcodone-acetaminophen (NORCO) 5-325 MG per tablet Take 1 tablet by mouth 2 times daily as needed for Pain for up to 30 days. Intended supply: 30 days 60 tablet 0        Medications patient taking as of now reconciled against medications ordered at time of hospital discharge: Yes    No chief complaint on file. History of Present illness - Follow up of Hospital diagnosis(es):   1. Fracture right hip she had surgical repair  2. Fracture right shoulder sling in place    Inpatient course: Discharge summary reviewed- see chart. Interval history/Current status:   #1 fracture right hip status post repair her daughter took her home and they are trying to use a hospital bed a [place] lift and getting neighbors to help. #2 fracture right shoulder they have been using a sling but not having much help with her arm placement  A comprehensive review of systems was negative except for what was noted in the HPI. There were no vitals filed for this visit. There is no height or weight on file to calculate BMI. Wt Readings from Last 3 Encounters:   03/01/21 115 lb (52.2 kg)   08/07/20 125 lb (56.7 kg)   07/07/20 125 lb (56.7 kg)     BP Readings from Last 3 Encounters:   03/04/21 (!) 174/66   03/01/21 (!) 88/44   09/28/20 (!) 148/68        Physical Exam:  Constitutional  well developed, well nourished. No diaphoresis or acute distress. Neck- ROM appears normal  Extremities -healing wound to right hip;  Secured with Glue;  Right shoulder is swollen and bruised   Pulmonary  effort appears normal.  No respiratory distress.   No accessory muscle use  Neurologic  alert and oriented X 3. Cranial Nerves II-XII grossly intact  Skin color is good;  no sign of dehydration   Psychiatric  mood, affect, and behavior appear normal.  Judgment and thought processes appear normal.         Assessment/Plan:  1. Closed fracture of right hip with routine healing, subsequent encounter    - HYDROcodone-acetaminophen (NORCO) 5-325 MG per tablet; Take 1 tablet by mouth 2 times daily as needed for Pain for up to 30 days. Intended supply: 30 days  Dispense: 60 tablet; Refill: 0    2. Closed fracture of distal end of right humerus, unspecified fracture morphology, initial encounter    - HYDROcodone-acetaminophen (NORCO) 5-325 MG per tablet; Take 1 tablet by mouth 2 times daily as needed for Pain for up to 30 days. Intended supply: 30 days  Dispense: 60 tablet; Refill: 0    3. Essential hypertension      4.  Vascular dementia without behavioral disturbance [facility])          Medical Decision Making: moderate complexity   High risk for DVT;

## 2021-03-09 NOTE — PATIENT INSTRUCTIONS
1.  Closed fracture of right hip s/p repair  2.  Closed fracture of right shoulder in sling; it may be better if you just swath her like a baby with the right arm along with a soft blanket or a twin she just to tie her arm to her chest to keep it stable will help it heal better and it will also decrease the pain  3,  Hypertension The current medical regimen is effective;  continue present plan and medications. 4.  Dementia  Stable on aricept    5  DVT prophalaxsis;  325 ASA daily   She had been given 12 oxycodone to be filled at the pharmacy. She has not had anything since she got home and almost a week so I told her be best not to fill those and will just send her hydrocodone 5 mg enough to take twice a day for 30 days and if that 5 mg does not hold her we can certainly increase the dose but she has been this long without it she should do fine.

## 2021-03-10 ENCOUNTER — TELEPHONE (OUTPATIENT)
Dept: INTERNAL MEDICINE CLINIC | Age: 86
End: 2021-03-10

## 2021-03-17 ENCOUNTER — TELEPHONE (OUTPATIENT)
Dept: INTERNAL MEDICINE CLINIC | Age: 86
End: 2021-03-17

## 2021-03-17 NOTE — TELEPHONE ENCOUNTER
[address] [address] requesting to add SW eval for this pt for caregiver support and counseling     Also OT eval completed and recommending OT 1x/week x 1 week, then 2x/week x 1 week;and  PCA assistance 1x/week x 1 week, then 2x/week x 4 weeks    Please advise if approved

## 2021-03-18 ENCOUNTER — TELEPHONE (OUTPATIENT)
Dept: INTERNAL MEDICINE CLINIC | Age: 86
End: 2021-03-18

## 2021-03-18 NOTE — TELEPHONE ENCOUNTER
Chriss chaudhry made a visit today on pt and her dgt   SW recommended  PATS for transport to medical appointments. SW  talked at length about stress In providing care. She declined support groups. Her sister is moving here in a few months and can help. She declined referral to the family caregiver program in light of this. She does use paid care about once a week right now to get out. But this will stop when her sister moves locally.    No further visits at this time

## 2021-03-24 ENCOUNTER — OFFICE VISIT (OUTPATIENT)
Dept: PALLATIVE CARE | Age: 86
End: 2021-03-24
Payer: MEDICARE

## 2021-03-24 VITALS
OXYGEN SATURATION: 94 % | TEMPERATURE: 97.9 F | DIASTOLIC BLOOD PRESSURE: 58 MMHG | RESPIRATION RATE: 18 BRPM | HEART RATE: 72 BPM | SYSTOLIC BLOOD PRESSURE: 110 MMHG

## 2021-03-24 DIAGNOSIS — Z74.1 REQUIRES ASSISTANCE WITH ACTIVITIES OF DAILY LIVING (ADL): ICD-10-CM

## 2021-03-24 DIAGNOSIS — Z87.81 STATUS POST-OPERATIVE REPAIR OF CLOSED FRACTURE OF RIGHT HIP: ICD-10-CM

## 2021-03-24 DIAGNOSIS — Z98.890 STATUS POST-OPERATIVE REPAIR OF CLOSED FRACTURE OF RIGHT HIP: ICD-10-CM

## 2021-03-24 DIAGNOSIS — S42.301S CLOSED FRACTURE OF RIGHT UPPER EXTREMITY, SEQUELA: ICD-10-CM

## 2021-03-24 DIAGNOSIS — L30.4 INTERTRIGO: ICD-10-CM

## 2021-03-24 DIAGNOSIS — K59.00 CONSTIPATION, UNSPECIFIED CONSTIPATION TYPE: Primary | ICD-10-CM

## 2021-03-24 DIAGNOSIS — F43.21 GRIEF: ICD-10-CM

## 2021-03-24 DIAGNOSIS — F03.90 DEMENTIA WITHOUT BEHAVIORAL DISTURBANCE, UNSPECIFIED DEMENTIA TYPE: ICD-10-CM

## 2021-03-24 DIAGNOSIS — Z51.5 ENCOUNTER FOR PALLIATIVE CARE: ICD-10-CM

## 2021-03-24 PROCEDURE — 99349 HOME/RES VST EST MOD MDM 40: CPT | Performed by: NURSE PRACTITIONER

## 2021-03-24 NOTE — PROGRESS NOTES
[address]  Initial Consultation Note      Patient Name:  Bennett Ramos  Medical Record Number:  [id number]  YOB: 1928    Date of Visit: 3/24/2021  Location of Visit:  [x]  Home    []  Other:      Referring Provider:  Shila TELLEZ  Primary Care Provider: GEOFF Mchugh  Additional Care Team Members:   Dr. Jerald Lao -ortho    Reason for Consult:  [x]  Goals of care     [x]  Symptom Management    [x]  Family Support      History obtained from:  patient, family member - daughter Steph Cruz, [address] record    [place] / RECOMMENDATIONS/PLAN:   Paula Campo was seen today for constipation and other. Diagnoses and all orders for this visit:    Constipation, unspecified constipation type  Miralax 17gm po daily as needed. Requires assistance with activities of daily living (ADL)  Limited mobility. Now approved by ortho to bear some weight right leg. Intertrigo  Keep skin clean, dry, and continue to apply Nystatin powder as prescribed. Grief  Continue Lexapro and Ativan    Dementia without behavioral disturbance, unspecified dementia type (HCC)  Continue Aricept    Closed fracture of right upper extremity, sequela  Status post-operative repair of closed fracture of right hip  Follow up with Dr. Jerald Lao as scheduled    Encounter for palliative care  I will see her again in about a month or sooner if needed. I have asked her daughter to call with any new concern. CHIEF COMPLAINT:     Chief Complaint   Patient presents with    Constipation    Other     imobility       CLINICAL SUMMARY:          Ms. Ana Floyd is a 80year old female with a PMH of dementia, anxiety, HTN. Now status post R femoral intertrochanteric fracture and proximal R humeral fracture after fall at home. Admitted to [facility] patient underwent of R femur. R proximal humerus fracture placed in sling. Had anemia postoperatively with Hgb  6.5 and received blood transfusion.   Daughter declined SNF placement opting to care for her at home with [facility] services and agreeable to supportive care visit at home. HPI AND VISIT SUMMARY:   I saw Ms. Juarez in her home. Her daughter Sofia Samuel was present and participated in the visit. Upon my arrival in a hospital bed as she was lying supine in a hospital bed. She was awake, alert, and in no acute distress. Sofia Samuel asked that I assist her with using [employer] lift to transfer patient to wheelchair. Patient tolerated this well and sat in [facility] for remainder of visit. She is unable to tolerate arm sling. We swaddled her arm to her chest with a sheet. She saw Dr. Tigre Verdugo yesterday and was approved for weight bearing right leg. She has PT, OT. She has bath aide scheduled but does not want anyone to assist her besides her daughter. She has a red moist rash under right arm and groin which they are treating with Nystatin powder. We discussed safety when transferring with [facility] lift and when turning in bed. Daughter is her primary care giver. Another daughter is moving nearby next week. She will also be able to stay with her as needed. Her appetite is good. She has had some constipation and we discussed trying Miralax. She urinates without difficulty. She can tell when she needs to urinate, but currently is not able to get up to the bathroom. They admit she does not drink enough fluids. She will try to drink a glass of water with her medications in addition to her usual beverages. Her pain is controlled. She denies pain when sitting still. She has only had to take two of her Norco this week. She was tearful at times when talking about her late . She takes Lexapro 10mg daily. She states wishing God would take her. She and her daughter have previously enjoyed getting in the car and going for a drive. They enjoy getting ice cream and going to the park.      Current goals include continuing to participate with physical therapy so that she can transfer and get into the car so they can go for their drives and daughter wants to continue to care for her at home. I think this is reasonable as long as her daughter has continued support from home care. ADVANCED CARE PLANNING:                                            Surrogate Decision Maker: yes Sg Tong  (daughter)    Durable Power of :yes    Advanced Directives/Living Lee: no    Out of hospital medical orders in place to reflect resuscitation status: no  CLINICAL PAIN ASSESSMENT:     FLACC (if non-verbal): 1    FUNCTIONAL ASSESSMENT:     Palliative Performance Scale: 40%    PSYCHOSOCIAL / SPIRITUAL SCREENING:     Any spiritual / Synagogue concerns:  []  Yes /  [x] No  Confucianist   Indicates campbell is important to her. HISTORY:     Past Medical History:        Diagnosis Date    Anxiety     Depression     Hx of blood clots     Hypertension     Palliative care patient 2020       Past Surgical History:        Procedure Laterality Date    APPENDECTOMY      ARM SURGERY      FEMUR FRACTURE SURGERY Right 3/1/2021    FEMUR IM NAIL YAZAN INSERTION Short performed by Cherylene Quinones, MD at [address] [place] [address], TOTAL ABDOMINAL      LEG SURGERY         Family History:       Family history unknown: Yes       Social History:    Social History     Tobacco Use    Smoking status: Never Smoker    Smokeless tobacco: Never Used   Substance Use Topics    Alcohol use: Never     Frequency: Never    Drug use: Never     ACTIVITIES OF DAILY LIVING:  Patient is unable to perform all activities of daily living. Patient's caretaker/ helper is daughter Saturnino Fiore.   MARITAL STATUS:   -  of 68 years  last year  OCCUPATION:  Retired  RESIDENCE: Patient currently lives with family daughter Saturnino Fiore  DME:  Hospital bed, wheelchair, [facility] lift, [facility] has been ordered  SUPPORT SYSTEM: Daughter and other family members    Current Medications:      Current Outpatient Medications:    aspirin (RENÉE ASPIRIN) 325 MG tablet, Take 1 tablet by mouth daily, Disp: 45 tablet, Rfl: 0    escitalopram (LEXAPRO) 10 MG tablet, TAKE 1 TABLET DAILY, Disp: 90 tablet, Rfl: 1    donepezil (ARICEPT) 10 MG tablet, TAKE 1 TABLET NIGHTLY, Disp: 90 tablet, Rfl: 1    amLODIPine (NORVASC) 5 MG tablet, TAKE 1 TABLET DAILY, Disp: 90 tablet, Rfl: 1    traZODone (DESYREL) 50 MG tablet, Take 1/2 tablet at bedtime, Disp: 30 tablet, Rfl: 1    LORazepam (ATIVAN) 1 MG tablet, TAKE 1 TABLET AT BEDTIME   AND YOU CAN TAKE AN EXTRA  1/2 TABLET DURING THE DAY  IF NEEDED, Disp: 45 tablet, Rfl: 0    diclofenac sodium (VOLTAREN) 1 % GEL, Apply 2 g topically 2 times daily, Disp: 1 Tube, Rfl: 3     Allergies:  Patient has no known allergies. Review of Systems   Constitutional: Positive for fatigue. Negative for chills and fever. Respiratory: Negative. Cardiovascular: Negative. Gastrointestinal: Positive for constipation. Negative for abdominal pain, diarrhea, nausea and vomiting. Genitourinary: Negative. Musculoskeletal: Positive for arthralgias and gait problem (unable to ambulate). Skin: Positive for rash (right axilla). Psychiatric/Behavioral: Positive for confusion.         Depression worse since death of spouse       OBJECTIVE:     Vitals:    03/24/21 1508   BP: (!) 110/58   Pulse: 72   Resp: 18   Temp: 97.9 °F (36.6 °C)   SpO2: 94%     General appearance: alert and cooperative with exam, vital signs stable, well developed, elderly female  HEENT: atraumatic, sclera clear,  external ears and nose are normal, lips normal.  Neck: supple  Lungs: equal bilateral expansion, clear to auscultation bilaterally, no cough, no dyspnea  Heart: regular rate and rhythm, S1 S2 normal   Abdomen:  soft, non-tender, bowel sounds active  Extremities:  no cyanosis, clubbing, or edema, pulses palpable  Neurologic: no focal neurologic deficits  Psychiatric: alert and oriented x 2, tearful when talking about missing her  Antibody Screen 03/01/2021 NEG     Iron 03/01/2021 15*    TIBC 03/01/2021 191*    Iron Saturation 03/01/2021 8*    Ferritin 03/01/2021 422.7*    Sodium 03/02/2021 144     Potassium reflex Magnesi* 03/02/2021 5.4*    Chloride 03/02/2021 104     CO2 03/02/2021 22     Anion Gap 03/02/2021 18     Glucose 03/02/2021 119*    BUN 03/02/2021 47*    CREATININE 03/02/2021 2.9*    GFR Non- 03/02/2021 15*    GFR  03/02/2021 18*    Calcium 03/02/2021 8.3     WBC 03/02/2021 16.4*    RBC 03/02/2021 2.44*    Hemoglobin 03/02/2021 7.6*    Hematocrit 03/02/2021 24.0*    MCV 03/02/2021 98.4     MCH 03/02/2021 31.1*    MCHC 03/02/2021 31.7*    RDW 03/02/2021 14.3     Platelets 37/95/8079 206     MPV 03/02/2021 9.9     Neutrophils % 03/02/2021 82.0*    Lymphocytes % 03/02/2021 9.3*    Monocytes % 03/02/2021 7.5     Eosinophils % 03/02/2021 0.1     Basophils % 03/02/2021 0.2     Neutrophils Absolute 03/02/2021 13.5*    Immature Granulocytes # 03/02/2021 0.1     Lymphocytes Absolute 03/02/2021 1.5     Monocytes Absolute 03/02/2021 1.20*    Eosinophils Absolute 03/02/2021 0.00     Basophils Absolute 03/02/2021 0.00     Protime 03/01/2021 14.9*    INR 03/01/2021 1.17     aPTT 03/01/2021 30.7     Sodium 03/03/2021 139     Potassium reflex Magnesi* 03/03/2021 4.3     Chloride 03/03/2021 104     CO2 03/03/2021 24     Anion Gap 03/03/2021 11     Glucose 03/03/2021 101     BUN 03/03/2021 48*    CREATININE 03/03/2021 1.6*    GFR Non- 03/03/2021 30*    GFR  03/03/2021 36*    Calcium 03/03/2021 8.0*    WBC 03/03/2021 11.0*    RBC 03/03/2021 2.10*    Hemoglobin 03/03/2021 6.5*    Hematocrit 03/03/2021 20.4*    MCV 03/03/2021 97.1     MCH 03/03/2021 31.0     MCHC 03/03/2021 31.9*    RDW 03/03/2021 14.1     Platelets 62/00/8852 161     MPV 03/03/2021 10.3     Neutrophils % 03/03/2021 77.4*    Lymphocytes % 03/03/2021 12.4*

## 2021-03-26 ENCOUNTER — TELEPHONE (OUTPATIENT)
Dept: INTERNAL MEDICINE CLINIC | Age: 86
End: 2021-03-26

## 2021-03-26 NOTE — TELEPHONE ENCOUNTER
[address] [address] OT reassessment completed   Per OT Pt now WBAT R LE and able to participate in mobility training for adl routines  Recommending :  OT 2x/week x 2 weeks, beginning 3/28/2021    Also PT re eval completed and recommending PT POC for 3w1, 2w1 for deep breathing, trunk/core, and LE exercises; bed mobility, transfer, gait training WBAT right LE, NWB right UE; balance activities; home safety instruction.      Please advise if approved

## 2021-04-03 ENCOUNTER — APPOINTMENT (OUTPATIENT)
Dept: CT IMAGING | Age: 86
End: 2021-04-03
Payer: MEDICARE

## 2021-04-03 ENCOUNTER — HOSPITAL ENCOUNTER (EMERGENCY)
Age: 86
Discharge: HOME | End: 2021-04-03
Attending: EMERGENCY MEDICINE
Payer: MEDICARE

## 2021-04-03 VITALS
WEIGHT: 115 LBS | TEMPERATURE: 98.7 F | RESPIRATION RATE: 18 BRPM | DIASTOLIC BLOOD PRESSURE: 53 MMHG | OXYGEN SATURATION: 92 % | HEART RATE: 73 BPM | BODY MASS INDEX: 22.46 KG/M2 | SYSTOLIC BLOOD PRESSURE: 146 MMHG

## 2021-04-03 DIAGNOSIS — N30.00 ACUTE CYSTITIS WITHOUT HEMATURIA: Primary | ICD-10-CM

## 2021-04-03 LAB
ALBUMIN SERPL-MCNC: 3.5 G/DL (ref 3.5–5.2)
ALP BLD-CCNC: 192 U/L (ref 35–104)
ALT SERPL-CCNC: 12 U/L (ref 5–33)
ANION GAP SERPL CALCULATED.3IONS-SCNC: 10 MMOL/L (ref 7–19)
AST SERPL-CCNC: 23 U/L (ref 5–32)
BACTERIA: ABNORMAL /HPF
BASOPHILS ABSOLUTE: 0.1 K/UL (ref 0–0.2)
BASOPHILS RELATIVE PERCENT: 0.7 % (ref 0–1)
BILIRUB SERPL-MCNC: 0.4 MG/DL (ref 0.2–1.2)
BILIRUBIN URINE: NEGATIVE
BLOOD, URINE: ABNORMAL
BUN BLDV-MCNC: 17 MG/DL (ref 8–23)
CALCIUM SERPL-MCNC: 9.1 MG/DL (ref 8.2–9.6)
CHLORIDE BLD-SCNC: 97 MMOL/L (ref 98–111)
CLARITY: ABNORMAL
CO2: 27 MMOL/L (ref 22–29)
COLOR: YELLOW
CREAT SERPL-MCNC: 0.6 MG/DL (ref 0.5–0.9)
CRYSTALS, UA: ABNORMAL /HPF
EOSINOPHILS ABSOLUTE: 0.1 K/UL (ref 0–0.6)
EOSINOPHILS RELATIVE PERCENT: 0.4 % (ref 0–5)
EPITHELIAL CELLS, UA: 0 /HPF (ref 0–5)
GFR AFRICAN AMERICAN: >59
GFR NON-AFRICAN AMERICAN: >60
GLUCOSE BLD-MCNC: 112 MG/DL (ref 74–109)
GLUCOSE URINE: NEGATIVE MG/DL
HCT VFR BLD CALC: 36.9 % (ref 37–47)
HEMOGLOBIN: 11.5 G/DL (ref 12–16)
HYALINE CASTS: 1 /HPF (ref 0–8)
IMMATURE GRANULOCYTES #: 0.1 K/UL
KETONES, URINE: NEGATIVE MG/DL
LEUKOCYTE ESTERASE, URINE: ABNORMAL
LIPASE: 16 U/L (ref 13–60)
LYMPHOCYTES ABSOLUTE: 1.3 K/UL (ref 1.1–4.5)
LYMPHOCYTES RELATIVE PERCENT: 9.9 % (ref 20–40)
MCH RBC QN AUTO: 31.3 PG (ref 27–31)
MCHC RBC AUTO-ENTMCNC: 31.2 G/DL (ref 33–37)
MCV RBC AUTO: 100.5 FL (ref 81–99)
MONOCYTES ABSOLUTE: 0.8 K/UL (ref 0–0.9)
MONOCYTES RELATIVE PERCENT: 6 % (ref 0–10)
NEUTROPHILS ABSOLUTE: 11.2 K/UL (ref 1.5–7.5)
NEUTROPHILS RELATIVE PERCENT: 82.6 % (ref 50–65)
NITRITE, URINE: POSITIVE
PDW BLD-RTO: 14.6 % (ref 11.5–14.5)
PH UA: 8.5 (ref 5–8)
PLATELET # BLD: 325 K/UL (ref 130–400)
PMV BLD AUTO: 9.4 FL (ref 9.4–12.3)
POTASSIUM SERPL-SCNC: 4.1 MMOL/L (ref 3.5–5)
PROTEIN UA: NEGATIVE MG/DL
RBC # BLD: 3.67 M/UL (ref 4.2–5.4)
RBC UA: 2 /HPF (ref 0–4)
SODIUM BLD-SCNC: 134 MMOL/L (ref 136–145)
SPECIFIC GRAVITY UA: 1.04 (ref 1–1.03)
TOTAL PROTEIN: 7 G/DL (ref 6.6–8.7)
UROBILINOGEN, URINE: 0.2 E.U./DL
WBC # BLD: 13.6 K/UL (ref 4.8–10.8)
WBC UA: 254 /HPF (ref 0–5)

## 2021-04-03 PROCEDURE — 99283 EMERGENCY DEPT VISIT LOW MDM: CPT

## 2021-04-03 PROCEDURE — 80053 COMPREHEN METABOLIC PANEL: CPT

## 2021-04-03 PROCEDURE — 36415 COLL VENOUS BLD VENIPUNCTURE: CPT

## 2021-04-03 PROCEDURE — 87186 SC STD MICRODIL/AGAR DIL: CPT

## 2021-04-03 PROCEDURE — 74177 CT ABD & PELVIS W/CONTRAST: CPT

## 2021-04-03 PROCEDURE — 87086 URINE CULTURE/COLONY COUNT: CPT

## 2021-04-03 PROCEDURE — 87077 CULTURE AEROBIC IDENTIFY: CPT

## 2021-04-03 PROCEDURE — 83690 ASSAY OF LIPASE: CPT

## 2021-04-03 PROCEDURE — 81001 URINALYSIS AUTO W/SCOPE: CPT

## 2021-04-03 PROCEDURE — 85025 COMPLETE CBC W/AUTO DIFF WBC: CPT

## 2021-04-03 PROCEDURE — 6360000004 HC RX CONTRAST MEDICATION: Performed by: EMERGENCY MEDICINE

## 2021-04-03 RX ORDER — CEFDINIR 300 MG/1
300 CAPSULE ORAL 2 TIMES DAILY
Qty: 14 CAPSULE | Refills: 0 | Status: SHIPPED | OUTPATIENT
Start: 2021-04-03 | End: 2021-04-10

## 2021-04-03 RX ADMIN — IOPAMIDOL 90 ML: 755 INJECTION, SOLUTION INTRAVENOUS at 18:11

## 2021-04-03 ASSESSMENT — ENCOUNTER SYMPTOMS
ABDOMINAL DISTENTION: 0
DIARRHEA: 0
NAUSEA: 0
ABDOMINAL PAIN: 1
SHORTNESS OF BREATH: 0
VOMITING: 0

## 2021-04-03 ASSESSMENT — PAIN DESCRIPTION - LOCATION: LOCATION: ABDOMEN

## 2021-04-03 ASSESSMENT — PAIN DESCRIPTION - DESCRIPTORS: DESCRIPTORS: ACHING

## 2021-04-03 ASSESSMENT — PAIN SCALES - GENERAL: PAINLEVEL_OUTOF10: 4

## 2021-04-03 ASSESSMENT — PAIN DESCRIPTION - PAIN TYPE: TYPE: ACUTE PAIN

## 2021-04-04 NOTE — ED PROVIDER NOTES
[facility] EMERGENCY DEPT  eMERGENCY dEPARTMENT eNCOUnter      Pt Name: Cris Sanders  MRN: [id number]  Gissellegfana maria 11/13/1928  Date of evaluation: 4/3/2021  Provider: Carley Sethi MD    CHIEF COMPLAINT       Chief Complaint   Patient presents with    Abdominal Pain         HISTORY OF PRESENT ILLNESS   (Location/Symptom, Timing/Onset,Context/Setting, Quality, Duration, Modifying Factors, Severity)  Note limiting factors. Cris Sanders is a 80 y.o. female who presents to the emergency department for evaluation regarding lower abdominal pain. Patient reports that this pain began acutely this afternoon. She describes moderate severity pain located over the lower portions of her abdomen. She has not had any vomiting or diarrhea episodes. Denies any prior history of diverticulitis. Reports that she does have a prior history of urinary tract infection. She has not had any fevers or chills. States that she has not had any loss of appetite. At this time the pain is much improved. States that earlier it was fairly severe. She did have a recent hip fracture and underwent surgical repair. HPI    NursingNotes were reviewed. REVIEW OF SYSTEMS    (2-9 systems for level 4, 10 or more for level 5)     Review of Systems   Constitutional: Negative for chills and fever. Respiratory: Negative for shortness of breath. Cardiovascular: Negative for chest pain. Gastrointestinal: Positive for abdominal pain. Negative for abdominal distention, diarrhea, nausea and vomiting. Neurological: Negative for syncope. All other systems reviewed and are negative.            PAST MEDICALHISTORY     Past Medical History:   Diagnosis Date    Anxiety     Depression     Hx of blood clots     Hypertension     Palliative care patient 08/13/2020         SURGICAL HISTORY       Past Surgical History:   Procedure Laterality Date    APPENDECTOMY      ARM SURGERY      FEMUR FRACTURE SURGERY Right 3/1/2021    FEMUR IM NAIL YAZAN INSERTION Short performed by Cherylene Quinones, MD at [facility], TOTAL ABDOMINAL      LEG SURGERY           CURRENT MEDICATIONS     Previous Medications    AMLODIPINE (NORVASC) 5 MG TABLET    TAKE 1 TABLET DAILY    ASPIRIN (RENÉE ASPIRIN) 325 MG TABLET    Take 1 tablet by mouth daily    DICLOFENAC SODIUM (VOLTAREN) 1 % GEL    Apply 2 g topically 2 times daily    DONEPEZIL (ARICEPT) 10 MG TABLET    TAKE 1 TABLET NIGHTLY    ESCITALOPRAM (LEXAPRO) 10 MG TABLET    TAKE 1 TABLET DAILY    HYDROCODONE-ACETAMINOPHEN (NORCO) 5-325 MG PER TABLET    Take 1 tablet by mouth 2 times daily as needed for Pain for up to 30 days. Intended supply: 30 days       ALLERGIES     Patient has no known allergies. FAMILY HISTORY       Family History   Family history unknown: Yes          SOCIAL HISTORY       Social History     Socioeconomic History    Marital status:       Spouse name: Not on file    Number of children: Not on file    Years of education: Not on file    Highest education level: Not on file   Occupational History    Not on file   Social Needs    Financial resource strain: Not on file    Food insecurity     Worry: Not on file     Inability: Not on file    Transportation needs     Medical: Not on file     Non-medical: Not on file   Tobacco Use    Smoking status: Never Smoker    Smokeless tobacco: Never Used   Substance and Sexual Activity    Alcohol use: Never     Frequency: Never    Drug use: Never    Sexual activity: Not on file   Lifestyle    Physical activity     Days per week: Not on file     Minutes per session: Not on file    Stress: Not on file   Relationships    Social connections     Talks on phone: Not on file     Gets together: Not on file     Attends Advent service: Not on file     Active member of club or organization: Not on file     Attends meetings of clubs or organizations: Not on file     Relationship status: Not on file    Intimate partner violence     Fear of current or ex partner: Not on file     Emotionally abused: Not on file     Physically abused: Not on file     Forced sexual activity: Not on file   Other Topics Concern    Not on file   Social History Narrative    Not on file       SCREENINGS             PHYSICAL EXAM    (up to 7 for level 4, 8 or more for level 5)     ED Triage Vitals [04/03/21 1543]   BP Temp Temp src Pulse Resp SpO2 Height Weight   (!) 146/53 98.7 °F (37.1 °C) -- 73 18 92 % -- 115 lb (52.2 kg)       Physical Exam  Vitals signs and nursing note reviewed. HENT:      Head: Atraumatic. Mouth/Throat:      Mouth: Mucous membranes are moist. Mucous membranes are not dry. Eyes:      General: No scleral icterus. Pupils: Pupils are equal, round, and reactive to light. Neck:      Trachea: No tracheal deviation. Cardiovascular:      Rate and Rhythm: Normal rate and regular rhythm. Heart sounds: Normal heart sounds. No murmur. Pulmonary:      Effort: Pulmonary effort is normal. No respiratory distress. Breath sounds: Normal breath sounds. No stridor. Abdominal:      General: There is no distension. Palpations: Abdomen is soft. Tenderness: There is abdominal tenderness in the suprapubic area and left lower quadrant. There is no right CVA tenderness, left CVA tenderness or guarding. Musculoskeletal:      Right lower leg: No edema. Left lower leg: No edema. Skin:     Capillary Refill: Capillary refill takes less than 2 seconds. Coloration: Skin is not pale. Findings: No rash. Neurological:      Mental Status: She is alert. Mental status is at baseline. Psychiatric:         Behavior: Behavior is cooperative.          DIAGNOSTIC RESULTS         RADIOLOGY:  Non-plain film images such as CT, Ultrasound and MRI are read by the radiologist. Plain radiographic images are visualized and preliminarily interpreted bythe emergency physician with the below findings:        CT ABDOMEN PELVIS W IV CONTRAST Additional Contrast? None Final Result   1. Moderate constipation and sigmoid diverticulosis without evidence   of acute diverticulitis. 2. Bladder wall thickening suspicious for acute cystitis. Clinical   correlation is recommended. 3. Mild distention of the gallbladder with no evidence of acute   cholecystitis. 4. Several small lobular low-attenuation lesions are noted in the   spleen, most likely benign cysts. 5. Evidence previous hysterectomy. 6. Diffuse osteopenia. , Previous bilateral hip fixation and there is a   chronic inferior endplate compression fracture at L1. Signed by Dr Rosita Brandon. Lillian on 4/3/2021 6:34 PM              LABS:  Labs Reviewed   CBC WITH AUTO DIFFERENTIAL - Abnormal; Notable for the following components:       Result Value    WBC 13.6 (*)     RBC 3.67 (*)     Hemoglobin 11.5 (*)     Hematocrit 36.9 (*)     .5 (*)     MCH 31.3 (*)     MCHC 31.2 (*)     RDW 14.6 (*)     Neutrophils % 82.6 (*)     Lymphocytes % 9.9 (*)     Neutrophils Absolute 11.2 (*)     All other components within normal limits   COMPREHENSIVE METABOLIC PANEL - Abnormal; Notable for the following components:    Sodium 134 (*)     Chloride 97 (*)     Glucose 112 (*)     Alkaline Phosphatase 192 (*)     All other components within normal limits   URINE RT REFLEX TO CULTURE - Abnormal; Notable for the following components:    Clarity, UA CLOUDY (*)     Blood, Urine SMALL (*)     pH, UA 8.5 (*)     Nitrite, Urine POSITIVE (*)     Leukocyte Esterase, Urine LARGE (*)     All other components within normal limits   MICROSCOPIC URINALYSIS - Abnormal; Notable for the following components:    Bacteria, UA 4+ (*)     Crystals, UA NEG (*)     WBC,  (*)     All other components within normal limits   CULTURE, URINE   LIPASE       All other labs were within normal range or not returned as of this dictation.     EMERGENCY DEPARTMENT COURSE and DIFFERENTIAL DIAGNOSIS/MDM:   Vitals:    Vitals:    04/03/21 1543   BP: (!) 146/53   Pulse: 73   Resp: 18   Temp: 98.7 °F (37.1 °C)   SpO2: 92%   Weight: 115 lb (52.2 kg)       MDM    Reassessment    Patient CT scan does not reveal any acute intra-abdominal pathology. Her urinalysis is consistent with a urinary tract infection. We will plan to start her on an outpatient course of oral antibiotics. Her other electrolytes look okay and I do not see any indication for inpatient hospitalization at this time. PROCEDURES:  Unless otherwise noted below, none     Procedures    FINAL IMPRESSION      1.  Acute cystitis without hematuria          DISPOSITION/PLAN   DISPOSITION Decision To Discharge 04/03/2021 08:07:57 PM      PATIENT REFERRED TO:  GEOFF Faye [facility] [id number]  [phone]            DISCHARGE MEDICATIONS:  New Prescriptions    CEFDINIR (OMNICEF) 300 MG CAPSULE    Take 1 capsule by mouth 2 times daily for 7 days          (Please note that portions of this note were completed with a voice recognition program.  Efforts were made to edit thedictations but occasionally words are mis-transcribed.)    Vane Coelho MD (electronically signed)  Attending Emergency Physician         Vane Coelho MD  04/03/21 1093

## 2021-04-04 NOTE — ED NOTES
Himanshu Odonnell at [facility] EMS dispatch for transportation back to home address.        Jordan Dominguez RN  04/03/21 3831

## 2021-04-05 ENCOUNTER — TELEPHONE (OUTPATIENT)
Dept: PALLATIVE CARE | Age: 86
End: 2021-04-05

## 2021-04-05 NOTE — TELEPHONE ENCOUNTER
I called and spoke to patient's daughter. I noted patient had been in the ER over the weekend and wanted to check on her. She states her mother had abdominal pain and work-up revealed a urinary tract infection. She has been improved since then. She is try to get her to drink more water. She is also started her on cranberry pills. She continues on antibiotic. She was having some constipation at that time. This is now improved. Her daughter is trying to provide more high-fiber foods. Home health continues for at least a couple more weeks. She has physical therapy and Occupational Therapy. Her mother is very reluctant to let her help transfer her by herself. Patient is also apprehensive about using a [employer] lift. Her daughter asked if there is any agency that will help pay for someone to help her provide personal care for patient. She feels that having someone there for about 3 hours a day to help her provide care to her mother would be useful. She does have a sitter that is available for 5-hour blocks of time but she does not always need someone for 5 full hours. She has to pay for 5 hours regardless. She can afford to pay someone for a few hours a day but not for 5 hours. She does not need someone to just sit with patient as her sister is able to provide some respite. Her sister is not physically able to provide a lot of hands-on care. We discussed other options. She might check with the high school to see if one of their health occupations/CNA students would want to help out for a few hours a day this summer. I asked her daughter to call with any new problems or concerns. I will plan to see her in 2 to 3 weeks. This dictation was generated by voice recognition computer software. Although all attempts are made to edit the dictation for accuracy, there may be errors in the transcription that are not intended.

## 2021-04-06 LAB
ORGANISM: ABNORMAL
ORGANISM: ABNORMAL
URINE CULTURE, ROUTINE: ABNORMAL

## 2021-04-08 DIAGNOSIS — F41.9 ANXIETY: ICD-10-CM

## 2021-04-09 ENCOUNTER — TELEPHONE (OUTPATIENT)
Dept: INTERNAL MEDICINE CLINIC | Age: 86
End: 2021-04-09

## 2021-04-09 NOTE — TELEPHONE ENCOUNTER
Pt has been discharged from OT    S:  OT discharge  B:  Pt of Carmen Vivar  A:  Pt has made significant progress and is now able to perform toileting routine from transfer level with assist from her daughter  R:  DC  OT at this time; of note: pt's daughter reports diarrhea, she associates with antibiotic from UTI; reports she has not given it consistently at bed time due to diarrhea; encouraged daugher to administer antibiotic as prescribed. PT requesting to continue treatment for 2X/W for 2 more weeks. Is this ok?

## 2021-04-12 RX ORDER — LORAZEPAM 1 MG/1
TABLET ORAL
Qty: 45 TABLET | Refills: 0 | Status: SHIPPED | OUTPATIENT
Start: 2021-04-12 | End: 2021-06-02 | Stop reason: SDUPTHER

## 2021-04-13 ENCOUNTER — TELEPHONE (OUTPATIENT)
Dept: INTERNAL MEDICINE | Age: 86
End: 2021-04-13

## 2021-04-13 NOTE — TELEPHONE ENCOUNTER
[facility] called requesting a refill of the below medication which has been pended for you:     Requested Prescriptions      No prescriptions requested or ordered in this encounter       Last Appointment Date: 3/9/2021  Next Appointment Date: 6/9/2021    No Known Allergies

## 2021-04-22 ENCOUNTER — TELEPHONE (OUTPATIENT)
Dept: INTERNAL MEDICINE | Age: 86
End: 2021-04-22

## 2021-04-22 RX ORDER — TRAZODONE HYDROCHLORIDE 50 MG/1
TABLET ORAL
Qty: 30 TABLET | Refills: 1 | Status: SHIPPED | OUTPATIENT
Start: 2021-04-22

## 2021-04-22 NOTE — TELEPHONE ENCOUNTER
Make sure she has tried melatonin     If she has we can send her some trazadone r25 mg to take 1/2 at bedtime

## 2021-04-23 ENCOUNTER — TELEPHONE (OUTPATIENT)
Dept: PALLATIVE CARE | Age: 86
End: 2021-04-23

## 2021-04-23 NOTE — TELEPHONE ENCOUNTER
I spoke with patient's daughter Geoff Suarez. Home health has now signed off. She did have some improvement with therapy. She and her brother in law were recently able to to assist her into the car and they went to [facility] to eat which patient enjoyed. We discussed trying to get into a routine with bathing, activity, getting out of bed, etc daily so not to lose ground since PT has ended. She continues to have left arm pain. I will see her for follow up next week.

## 2021-04-25 ASSESSMENT — ENCOUNTER SYMPTOMS
CONSTIPATION: 1
VOMITING: 0
NAUSEA: 0
ABDOMINAL PAIN: 0
RESPIRATORY NEGATIVE: 1
DIARRHEA: 0

## 2021-04-28 ENCOUNTER — OFFICE VISIT (OUTPATIENT)
Dept: PALLATIVE CARE | Age: 86
End: 2021-04-28
Payer: MEDICARE

## 2021-04-28 VITALS
TEMPERATURE: 97.7 F | DIASTOLIC BLOOD PRESSURE: 58 MMHG | OXYGEN SATURATION: 96 % | HEART RATE: 78 BPM | SYSTOLIC BLOOD PRESSURE: 118 MMHG

## 2021-04-28 DIAGNOSIS — Z98.890 STATUS POST-OPERATIVE REPAIR OF CLOSED FRACTURE OF RIGHT HIP: ICD-10-CM

## 2021-04-28 DIAGNOSIS — R53.1 GENERALIZED WEAKNESS: Primary | ICD-10-CM

## 2021-04-28 DIAGNOSIS — S42.301S CLOSED FRACTURE OF RIGHT UPPER EXTREMITY, SEQUELA: ICD-10-CM

## 2021-04-28 DIAGNOSIS — Z51.5 ENCOUNTER FOR PALLIATIVE CARE: ICD-10-CM

## 2021-04-28 DIAGNOSIS — Z74.1 REQUIRES ASSISTANCE WITH ACTIVITIES OF DAILY LIVING (ADL): ICD-10-CM

## 2021-04-28 DIAGNOSIS — F03.90 DEMENTIA WITHOUT BEHAVIORAL DISTURBANCE, UNSPECIFIED DEMENTIA TYPE: ICD-10-CM

## 2021-04-28 DIAGNOSIS — Z87.81 STATUS POST-OPERATIVE REPAIR OF CLOSED FRACTURE OF RIGHT HIP: ICD-10-CM

## 2021-04-28 PROCEDURE — 99350 HOME/RES VST EST HIGH MDM 60: CPT | Performed by: NURSE PRACTITIONER

## 2021-04-29 NOTE — PROGRESS NOTES
Supportive Care/Community Based Palliative Care  Follow Up Note        Patient Name:  Sanna Loaiza  Medical Record Number:  [id number]  YOB: 1928    Date of Visit: 4/28/2021  Location of Visit:  [x]  Home    []  Other:      Patient Care Team:  GEOFF Ren - PCP   Dr. Sailaja Fierro- ortho     History obtained from:  patient, family member -daughter Alexandr Woodard, [facility] medical record    PALLIATIVE DIAGNOSES AND ORDERS/RECOMMENDATIONS/PLAN:   Diagnoses and all orders for this visit:    Generalized weakness  Requires assistance with activities of daily living (ADL)  Turn often and get out of bed as much as possible. Despite recent physical therapy she is now more deconditioned and having more stiffness of the right leg due to being in bed for the last week and a half. They do have a tobias lift. Discussed further decline is likely due to lack of mobility. Discussed incontinence care and continuing to use barrier creams. Closed fracture of right upper extremity, sequela  Status post-operative repair of closed fracture of right hip  Follow up with Dr. Jeevan Russell as scheduled. Dementia without behavioral disturbance, unspecified dementia type ([facility] [address]  No new changes    Encounter for palliative care  Current Goals of Care:  improve or maintain function/quality of life, remain at home, engage other family members in assisting    Palliative Plan:   Education/support to family, Caregiver support/education, Encouraged the use of respite sitters and self-care for daughter. Continue goals of care discussion - uncertain if daughter will be able to continue to care for her long term. I will see her again in about a month or sooner if needed. CHIEF COMPLAINT:     Chief Complaint   Patient presents with    Other     Daughter reports she doesn't wan to get out of bed. CLINICAL SUMMARY:        Ms. Rg Frank is a 80year old female with a PMH of dementia, anxiety, HTN.  Now status post R femoral able to continue to care for her long term without more support at home. Her sister and brother-in-law do live in the area now. Her sister is not physically able to provided hands on care but does sit with her mother when needed. She reports that her sister and brother in law feel guilty that they are not providing more assistance and it makes her feel guilty to ask them for more help. We discussed other ways they might be of assistance. Andrae Humphrey has been paying for a caregiver as often as she with their limited income. I gave her examples of other ways that her family might be able to assist such as helping to pay for her briefs or other supplies so that they can free of that money to use for caregivers. Andrae Humphrey reports that they do often ask what they can do to help but says she is reluctant to ask for any assistance. I have encouraged her to think of ways where they might be able to help, ways they are capable of, which might not be physical help in the home. She agrees. She states that she will try to talk to her family. I have offered to have a family meeting to try to rally the family to participate more as they are able and discuss ways that they can meaningfully contribute. She declines for now. Andrae Humphrey is going out of town this weekend for respite and time with her son. A paid caregiver will be staying with patient and family will be checking in. I spoke with patient about trying to get out of bed more. We talked about risk of bedsores and have encouraged her to turn often  to get her out of bed as much as possible. Despite recent physical therapy she is now more deconditioned and having more stiffness of the right leg due to being in bed for the last week and a half. They do have a tobias lift. Discussed further decline is likely due to lack of mobility. Active listening, support, and encouragement given to patient's daughter.        ADVANCED CARE PLANNING: constipation (controlled). Negative for abdominal pain, diarrhea, nausea and vomiting. Genitourinary: Negative. Musculoskeletal: Positive for arthralgias and gait problem (unable to ambulate). Skin: Positive for rash (right axilla). Psychiatric/Behavioral: Positive for confusion. Depression worse since death of spouse        OBJECTIVE:     Vitals:    04/28/21 1311   BP: (!) 118/58   Pulse: 78   Temp: 97.7 °F (36.5 °C)   SpO2: 96%   General appearance: alert and cooperative with exam, vital signs stable, well developed, elderly female  HEENT: atraumatic, sclera clear,  external ears and nose are normal, lips normal.  Neck: supple  Lungs: equal bilateral expansion, clear to auscultation bilaterally, no cough, no dyspnea  Heart: regular rate and rhythm, S1 S2 normal   Abdomen:  soft, non-tender, bowel sounds active  Extremities:  no cyanosis, clubbing, or edema, pulses palpable  Neurologic: no focal neurologic deficits, follows commands, Neetu  Psychiatric: alert and oriented x 2, some confusion at times  Skin: warm, dry, right axilla with dry peeling skin, no acute infection    LAB DATA REVIEWED:     No visits with results within 7 Day(s) from this visit.    Latest known visit with results is:   Admission on 04/03/2021, Discharged on 04/03/2021   Component Date Value    WBC 04/03/2021 13.6*    RBC 04/03/2021 3.67*    Hemoglobin 04/03/2021 11.5*    Hematocrit 04/03/2021 36.9*    MCV 04/03/2021 100.5*    MCH 04/03/2021 31.3*    MCHC 04/03/2021 31.2*    RDW 04/03/2021 14.6*    Platelets 65/14/7336 325     MPV 04/03/2021 9.4     Neutrophils % 04/03/2021 82.6*    Lymphocytes % 04/03/2021 9.9*    Monocytes % 04/03/2021 6.0     Eosinophils % 04/03/2021 0.4     Basophils % 04/03/2021 0.7     Neutrophils Absolute 04/03/2021 11.2*    Immature Granulocytes # 04/03/2021 0.1     Lymphocytes Absolute 04/03/2021 1.3     Monocytes Absolute 04/03/2021 0.80     Eosinophils Absolute 04/03/2021 0.10     9:59 AM

## 2021-05-12 ENCOUNTER — TELEPHONE (OUTPATIENT)
Dept: INTERNAL MEDICINE | Age: 86
End: 2021-05-12

## 2021-05-12 RX ORDER — CEFDINIR 300 MG/1
300 CAPSULE ORAL 2 TIMES DAILY
Qty: 14 CAPSULE | Refills: 0 | Status: SHIPPED | OUTPATIENT
Start: 2021-05-12 | End: 2021-05-19

## 2021-05-12 NOTE — TELEPHONE ENCOUNTER
Patient daughter states she did a at home UA testing kit which showed she has a UTI. Daughter states she has been complaining of abdominal pain and hasn't been wanting to get out of bed. Patient does not have home health any longer. Daughter wants to know if you would treat for UTI. Daughter states very hard to get UA here for testing.  Please advise

## 2021-05-31 ASSESSMENT — ENCOUNTER SYMPTOMS
ABDOMINAL PAIN: 0
RESPIRATORY NEGATIVE: 1
NAUSEA: 0
DIARRHEA: 0
CONSTIPATION: 1
VOMITING: 0

## 2021-06-02 DIAGNOSIS — F41.9 ANXIETY: ICD-10-CM

## 2021-06-02 RX ORDER — LORAZEPAM 1 MG/1
TABLET ORAL
Qty: 45 TABLET | Refills: 0 | Status: SHIPPED | OUTPATIENT
Start: 2021-06-02 | End: 2021-07-19

## 2021-06-02 NOTE — TELEPHONE ENCOUNTER
Robyn Caller called requesting a refill of the below medication which has been pended for you:     Requested Prescriptions     Pending Prescriptions Disp Refills    LORazepam (ATIVAN) 1 MG tablet 45 tablet 0     Si at bedtime and you can take an extra 1/2 during the day if needed.        Last Appointment Date: 3/9/2021  Next Appointment Date: 2021    No Known Allergies

## 2021-06-03 ENCOUNTER — OFFICE VISIT (OUTPATIENT)
Dept: PALLATIVE CARE | Age: 86
End: 2021-06-03
Payer: MEDICARE

## 2021-06-03 DIAGNOSIS — F03.90 DEMENTIA WITHOUT BEHAVIORAL DISTURBANCE, UNSPECIFIED DEMENTIA TYPE: ICD-10-CM

## 2021-06-03 DIAGNOSIS — Z74.1 REQUIRES ASSISTANCE WITH ACTIVITIES OF DAILY LIVING (ADL): ICD-10-CM

## 2021-06-03 DIAGNOSIS — R53.1 GENERALIZED WEAKNESS: Primary | ICD-10-CM

## 2021-06-03 DIAGNOSIS — Z98.890 STATUS POST-OPERATIVE REPAIR OF CLOSED FRACTURE OF RIGHT HIP: ICD-10-CM

## 2021-06-03 DIAGNOSIS — Z51.5 ENCOUNTER FOR PALLIATIVE CARE: ICD-10-CM

## 2021-06-03 DIAGNOSIS — S42.301S CLOSED FRACTURE OF RIGHT UPPER EXTREMITY, SEQUELA: ICD-10-CM

## 2021-06-03 DIAGNOSIS — Z87.81 STATUS POST-OPERATIVE REPAIR OF CLOSED FRACTURE OF RIGHT HIP: ICD-10-CM

## 2021-06-03 PROCEDURE — 99347 HOME/RES VST EST SF MDM 20: CPT | Performed by: NURSE PRACTITIONER

## 2021-06-04 NOTE — PROGRESS NOTES
Supportive Care/Community Based Palliative Care  Follow Up Note      Patient Name:  Martina Richards  Medical Record Number:  [id number]  YOB: 1928    Date of Visit: 6/3/2021  Location of Visit:  [x]  Home    []  Other:      Patient Care Team:  GEOFF Johnson - PCP   Dr. Therese Barcenas- ortho     History obtained from:  patient, family member - daughter Mervin Ghotra, [facility] medical record    [facility] ORDERS/RECOMMENDATIONS/PLAN:   Ninfa Ley was seen today for follow-up. Diagnoses and all orders for this visit:    Generalized weakness  Requires assistance with activities of daily living (ADL)    Status post-operative repair of closed fracture of right hip  Closed fracture of right upper extremity, sequela    Dementia without behavioral disturbance, unspecified dementia type ([facility] [address]    Encounter for palliative care  · Current Goals of Care:  improve or maintain function/quality of life, remain at home. Primary caregiver is getting more respite and continued self care encouraged. · I will see her again in 6-8 weeks or sooner if needed.        CHIEF COMPLAINT:     Chief Complaint   Patient presents with    Follow-up     palliative care/family support       CLINICAL SUMMARY:        Ms. Nelly Badillo is a 80year old female [de-identified] PMH of dementia, anxiety, HTN. Now status post R femoral intertrochanteric fracture and proximal R humeral fracture after fall at home.  Admitted to [facility] patient underwent of R femur. R proximal humerus fracture placed in sling.  Had anemia postoperatively with Hgb  6.5 and received blood transfusion.  Daughter declined SNF placement opting to care for her at [facility] and agreeable to supportive care visit at home.      ED visit 4/3/2021 due to low abdpain. CT abd/pelvis W/contrast: moderate constipation, sigmoid diverticulosis without diverticulitis. 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 Urine culture 4/6/2021 grew greater than 100,000 CFU/ml Klebsiella pneumoniae, light growth E. Coli. Treated with cefdinir 300 mg twice daily x7 days. HPI AND VISIT SUMMARY:     I saw Skyler Antonio in her home. Her daughter Ranulfo Hernandez was present and participated in the visit. Upon my arrival patient was noted to be awake, alert, oriented, and in no distress. Since my last visit her daughter was able to take a trip to [place] with her son. This is good respite for her. Patient's daughter and son in law continue to try to help as they can. Patient has been seen by Dr. Eden Moss and got a good report according to Ranulfo Hernandez. He wants her to increase her weightbearing. Patient is able to get up in the wheelchair with Javier Guillermoven her with transferring. Ranulfo Hernandez reports she is no longer using a Chantell lift as it was more problematic than helpful. No skin problems at this time. Overall patient and daughter seem to be managing well at this time.     ADVANCED CARE PLANNING:                                            Surrogate Decision Maker: yes (Daughter Patricia Melgoza)     Durable Power of :yes  -on file no     Advanced Directives/Living Lee: yes  -on file no     Out of hospital medical orders in place to reflect resuscitation status: no    CLINICAL PAIN ASSESSMENT:     FLACC:  0    FUNCTIONAL ASSESSMENT:     Palliative Performance Scale:  [x] 40% Mainly in bed; Extensive disease; Mainly assist; intake normal or reduced; occasional assist; LOC full/confusion    PSYCHOSOCIAL / SPIRITUAL SCREENING:     Any spiritual / Advent concerns:  []  Yes /  [x] No    Caregiver Burnout:  []  Yes / [x] No / [] No Caregiver present    Anticipatory grief assessment:  [x] Normal / [] Maladaptive    HISTORY:     Past medical history, surgical history, family history, social history unchanged    Current Medications:      Current Outpatient Medications:     LORazepam (ATIVAN) 1 MG tablet, 1 at bedtime and you can take an extra 1/2 during the day if needed. , Disp: 45 tablet, Rfl: 0    traZODone (DESYREL) 50 MG tablet, Take 1/2 tablet at bedtime, Disp: 30 tablet, Rfl: 1    aspirin (RENÉE ASPIRIN) 325 MG tablet, Take 1 tablet by mouth daily, Disp: 45 tablet, Rfl: 0    escitalopram (LEXAPRO) 10 MG tablet, TAKE 1 TABLET DAILY, Disp: 90 tablet, Rfl: 1    donepezil (ARICEPT) 10 MG tablet, TAKE 1 TABLET NIGHTLY, Disp: 90 tablet, Rfl: 1    amLODIPine (NORVASC) 5 MG tablet, TAKE 1 TABLET DAILY, Disp: 90 tablet, Rfl: 1    diclofenac sodium (VOLTAREN) 1 % GEL, Apply 2 g topically 2 times daily, Disp: 1 Tube, Rfl: 3     Allergies:  Patient has no known allergies. Review of Systems   Constitutional: Positive for fatigue. Negative for chills and fever. HENT: Negative. Respiratory: Negative. Cardiovascular: Negative. Gastrointestinal: Positive for constipation (controlled). Negative for abdominal pain, diarrhea, nausea and vomiting. Genitourinary: Negative. Musculoskeletal: Positive for arthralgias and gait problem (unable to ambulate). Skin: Negative. Psychiatric/Behavioral: Positive for confusion.         Depression worse since death of spouse     OBJECTIVE:     Vitals:    06/03/21 1215   BP: (!) 122/54   Pulse: 77   Resp: 18   Temp: 97.9 °F (36.6 °C)   SpO2: 95%   General appearance: alert and cooperative with exam, vital signs stable, well developed, elderly female  HEENT: atraumatic, sclera clear,  external ears and nose are normal, lips normal.  Neck: supple  Lungs: equal bilateral expansion, clear to auscultation bilaterally, no cough, no dyspnea  Heart: regular rate and rhythm, S1 S2 normal   Abdomen:  soft, non-tender, bowel sounds active  Extremities:  no edema, limited use of right arm  Neurologic: no focal neurologic deficits, follows commands, Neetu  Psychiatric: alert and oriented x

## 2021-06-09 ENCOUNTER — VIRTUAL VISIT (OUTPATIENT)
Dept: INTERNAL MEDICINE | Age: 86
End: 2021-06-09
Payer: MEDICARE

## 2021-06-09 DIAGNOSIS — F41.9 ANXIETY: ICD-10-CM

## 2021-06-09 DIAGNOSIS — F05 SUNDOWN SYNDROME: ICD-10-CM

## 2021-06-09 DIAGNOSIS — F51.01 PRIMARY INSOMNIA: ICD-10-CM

## 2021-06-09 DIAGNOSIS — S42.291A CLOSED FRACTURE OF ANATOMICAL NECK OF HUMERUS, RIGHT, INITIAL ENCOUNTER: ICD-10-CM

## 2021-06-09 DIAGNOSIS — S72.141A DISPLACED INTERTROCHANTERIC FRACTURE OF RIGHT FEMUR, INIT (HCC): Primary | ICD-10-CM

## 2021-06-09 DIAGNOSIS — I10 ESSENTIAL HYPERTENSION: ICD-10-CM

## 2021-06-09 DIAGNOSIS — F32.1 CURRENT MODERATE EPISODE OF MAJOR DEPRESSIVE DISORDER WITHOUT PRIOR EPISODE (HCC): ICD-10-CM

## 2021-06-09 PROCEDURE — 99443 PR PHYS/QHP TELEPHONE EVALUATION 21-30 MIN: CPT | Performed by: NURSE PRACTITIONER

## 2021-06-09 ASSESSMENT — ENCOUNTER SYMPTOMS
STRIDOR: 0
BLOOD IN STOOL: 0
ABDOMINAL DISTENTION: 0
WHEEZING: 0
TROUBLE SWALLOWING: 0
ABDOMINAL PAIN: 0
NAUSEA: 0
EYE ITCHING: 0
CONSTIPATION: 0
DIARRHEA: 0
COUGH: 0
VOMITING: 0
EYE DISCHARGE: 0
SHORTNESS OF BREATH: 0
CHOKING: 0
COLOR CHANGE: 0
SORE THROAT: 0

## 2021-06-09 NOTE — PATIENT INSTRUCTIONS
Problem List     Anxiety     Stable with lorazepam           Relevant Medications    escitalopram (LEXAPRO) 10 MG tablet    traZODone (DESYREL) 50 MG tablet    LORazepam (ATIVAN) 1 MG tablet    Closed fracture of anatomical neck of humerus, right, initial encounter     Not using;  Dr Kemi Dow wants her to have more PT           Current moderate episode of major depressive disorder without prior episode ([facility])    Relevant Medications    escitalopram (LEXAPRO) 10 MG tablet    traZODone (DESYREL) 50 MG tablet    LORazepam (ATIVAN) 1 MG tablet    Displaced intertrochanteric fracture of right femur, init (HCC) - Primary    Essential hypertension     Stale with amlodipine 5 mg daiy           Primary insomnia    SunDown syndrome

## 2021-06-09 NOTE — PROGRESS NOTES
[address] INTERNAL MEDICINE  [address] [address] [place]ib[place] [address]  Dept: [phone]  Dept Fax: [phone]: [phone]    Pia Conner (:  1928) is a 80 y.o. female,Established patient, here for evaluation of the following chief complaint(s): Anxiety      Pia Conner is a 80 y.o. female who were are performing tele health communication  for her medical conditions/complaints as noted below. Currently, our state is under umbrella of national state of emergency and we are using alternative methods of taking care of the needs of our patients so they are not exposed in our office; The patient has consented to this form of communication  Pia Conner is c/gar[place]   Anxiety    THE patient is at home   Dilip Monterroso is at office   Others in attendance is her daughter     HPI:     Chief Complaint   Patient presents with    Anxiety     HPI   1. Femur fracture; She gets up in the w.c  She had another appt with Dr Maribell Craven and he thought her leg looked great   2. Shoulder fracture;  Dr Maribell Craven told her she was unsure if she would gain reuse of her arm ; She is not interested in getting up and sitting in a w/c    3. Hypertension she is on amlodipine 5 daily   4. Appetite is good;    5. Anxiety; She is on lorazepam 1 mg 1/2 during the day and then 1 at bedtime;   6. Insomnia; She is taking 1/2 trazadone 1/2 at bedtime   7. Sundowners; Her daughter is describing very early symptoms;  ;  They are not ready to do the meds now;    8. Depression; she is on lexapro 10 daily  She is still asking for her  who passed away a year ago;     Past Medical History:   Diagnosis Date    Anxiety     Depression     Hx of blood clots     Hypertension     Palliative care patient 2020      Past Surgical History:   Procedure Laterality Date    APPENDECTOMY      ARM SURGERY      FEMUR FRACTURE SURGERY Right 3/1/2021    FEMUR IM NAIL YAZAN INSERTION Short performed by Ilya Martinez MD at [facility], TOTAL ABDOMINAL      LEG SURGERY         Vitals 4/28/2021 4/3/2021 3/24/2021 3/1/2021 3/1/2021 1/3/5015   SYSTOLIC 568 838 494 - - -   DIASTOLIC 58 53 58 - - -   Site Left Upper Arm - Left Upper Arm - - -   Position Supine - Sitting - - -   Cuff Size Medium Adult - Medium Adult - - -   Pulse 78 73 72 - - -   Temp 97.7 98.7 97.9 - - -   Resp - 18 18 5 30 44   SpO2 96 92 94 - - 91   Weight - 115 lb - - - -   Pain Level - 4 - - - -   Some recent data might be hidden       Family History   Family history unknown: Yes       Social History     Tobacco Use    Smoking status: Never Smoker    Smokeless tobacco: Never Used   Substance Use Topics    Alcohol use: Never      Current Outpatient Medications   Medication Sig Dispense Refill    LORazepam (ATIVAN) 1 MG tablet 1 at bedtime and you can take an extra 1/2 during the day if needed. 45 tablet 0    traZODone (DESYREL) 50 MG tablet Take 1/2 tablet at bedtime 30 tablet 1    aspirin (RENÉE ASPIRIN) 325 MG tablet Take 1 tablet by mouth daily 45 tablet 0    escitalopram (LEXAPRO) 10 MG tablet TAKE 1 TABLET DAILY 90 tablet 1    donepezil (ARICEPT) 10 MG tablet TAKE 1 TABLET NIGHTLY 90 tablet 1    amLODIPine (NORVASC) 5 MG tablet TAKE 1 TABLET DAILY 90 tablet 1    diclofenac sodium (VOLTAREN) 1 % GEL Apply 2 g topically 2 times daily 1 Tube 3     No current facility-administered medications for this visit.      No Known Allergies    Health Maintenance   Topic Date Due    COVID-19 Vaccine (1) Never done    DTaP/Tdap/Td vaccine (1 - Tdap) Never done    Shingles Vaccine (1 of 2) 07/01/2021 (Originally [birth year]/13/1978)    Flu vaccine (Season Ended) 12/09/2021 (Originally 9/1/2021)    Pneumococcal 65+ years Vaccine (1 of 1 - PPSV23) 07/15/2030 (Originally 11/13/1993)    Annual Wellness Visit (AWV)  12/10/2021    Potassium monitoring  04/03/2022    Creatinine monitoring  04/03/2022    Hepatitis A vaccine  Aged Out    Hepatitis B vaccine  Aged Out    Hib vaccine  Aged Out    Meningococcal (ACWY) vaccine  Aged Out       No results found for: LABA1C  No results found for: PSA, PSADIA  TSH   Date Value Ref Range Status   07/01/2020 2.920 0.270 - 4.200 uIU/mL Final   ]  Lab Results   Component Value Date     (L) 04/03/2021    K 4.1 04/03/2021    CL 97 (L) 04/03/2021    CO2 27 04/03/2021    BUN 17 04/03/2021    CREATININE 0.6 04/03/2021    GLUCOSE 112 (H) 04/03/2021    CALCIUM 9.1 04/03/2021    PROT 7.0 04/03/2021    LABALBU 3.5 04/03/2021    BILITOT 0.4 04/03/2021    ALKPHOS 192 (H) 04/03/2021    AST 23 04/03/2021    ALT 12 04/03/2021    LABGLOM >60 04/03/2021    GFRAA >59 04/03/2021     Lab Results   Component Value Date    CHOL 177 07/01/2020     Lab Results   Component Value Date    TRIG 90 07/01/2020     Lab Results   Component Value Date    HDL 59 (L) 07/01/2020     Lab Results   Component Value Date    LDLCALC 100 07/01/2020     Lab Results   Component Value Date     04/03/2021    K 4.1 04/03/2021    K 4.5 03/04/2021    CL 97 04/03/2021    CO2 27 04/03/2021    BUN 17 04/03/2021    CREATININE 0.6 04/03/2021    GLUCOSE 112 04/03/2021    CALCIUM 9.1 04/03/2021      Lab Results   Component Value Date    WBC 13.6 (H) 04/03/2021    HGB 11.5 (L) 04/03/2021    HCT 36.9 (L) 04/03/2021    .5 (H) 04/03/2021     04/03/2021    LYMPHOPCT 9.9 (L) 04/03/2021    RBC 3.67 (L) 04/03/2021    MCH 31.3 (H) 04/03/2021    MCHC 31.2 (L) 04/03/2021    RDW 14.6 (H) 04/03/2021     Lab Results   Component Value Date    VITD25 31.6 07/01/2020       Subjective:      Review of Systems   Constitutional: Negative for fatigue, fever and unexpected weight change. HENT: Negative for ear discharge, ear pain, mouth sores, sore throat and trouble swallowing. Eyes: Negative for discharge, itching and visual disturbance. Respiratory: Negative for cough, choking, shortness of breath, wheezing and stridor.     Cardiovascular: Negative for chest Allpatient questions answered. Pt voiced understanding. Reviewed health maintenance. Instructed to continue current medications, diet and exercise. Patient agreed with treatment plan. Follow up as directed. MEDICATIONS:  No orders of the defined types were placed in this encounter. ORDERS:  No orders of the defined types were placed in this encounter. Follow-up:  No follow-ups on file. Following the remote visit today we will call you when we are available to reschedule your follow up appt      PATIENT INSTRUCTIONS:  There are no Patient Instructions on file for this visit. Electronically signed by Fleet Prader, APRN on 6/9/2021 at 3:29 PM   tele visit 30 minutes  We are communicating with telehealth for the 3 month fu for controlled substance      Pursuant to the emergency declaration under the [address] [place] [address] waiver authority and the [employer] Act, this Virtual Visit was conducted, with patient's consent, to reduce the patient's risk of exposure to COVID-19 and provide continuity of care for an established patient. EMRDragon/transcription disclaimer:  Much of this encounter note is electronic    Rina Hall is a 80 y.o. female being evaluated by a Virtual Visit (video visit) encounter to address concerns as mentioned above. A caregiver was present when appropriate. Due to this being a TeleHealth encounter (During [facility] public health emergency), evaluation of the following organ systems was limited: Vitals/Constitutional/EENT/Resp/CV/GI//MS/Neuro/Skin/Heme-Lymph-Imm.   Pursuant to the emergency declaration under the [address] [place] [address], [address] [place] [address] waiver authority and the [employer] Act, this Virtual Visit was conducted with patient's (and/or legal guardian's) consent, to reduce the patient's risk of exposure to COVID-19 and provide necessary medical care. The patient (and/or legal guardian) has also been advised to contact this office for worsening conditions or problems, and seek emergency medical treatment and/or call 911 if deemed necessary.      Patient identification was verified at the start of the visit: Yes

## 2021-06-22 ENCOUNTER — TELEPHONE (OUTPATIENT)
Dept: PALLATIVE CARE | Age: 86
End: 2021-06-22

## 2021-06-22 NOTE — TELEPHONE ENCOUNTER
Patient's daughter Velasquez Pascual called yesterday and left message saying patient was complaining of abdominal pain. When I returned her call she reported the pain had resolved after patient had a BM. Today she sent a picture of bed pad/armand pad with dark lore colored urine. She reports patient hasn't been drinking as much. I have asked her to encourage and offer more fluids the next couple of days and see if it improves. I can see her Friday if needed. She voiced understanding.

## 2021-07-18 DIAGNOSIS — F41.9 ANXIETY: ICD-10-CM

## 2021-07-19 RX ORDER — LORAZEPAM 1 MG/1
TABLET ORAL
Qty: 45 TABLET | Refills: 0 | Status: SHIPPED | OUTPATIENT
Start: 2021-07-19 | End: 2021-08-18

## 2021-07-19 NOTE — TELEPHONE ENCOUNTER
Kathrine Iverson called to request a refill on her medication. Last office visit : 6/9/2021   Next office visit : 9/9/2021     Last UDS: No results found for: Doneta Haring, LABBENZ, BUPRENUR, COCAIMETSCRU, GABAPENTIN, MDMA, METAMPU, OPIATESCREENURINE, OXTCOSU, PHENCYCLIDINESCREENURINE, PROPOXYPHENE, THCSCREENUR, Igor Gunnels 6/7/21      Requested Prescriptions     Pending Prescriptions Disp Refills    LORazepam (ATIVAN) 1 MG tablet [Pharmacy Med Name: LORazepam 1 MG TABLET] 45 tablet 0     Sig: TAKE ONE TABLET BY MOUTH EVERY NIGHT AT BEDTIME AND MAY TAKE AN EXTRA ONE-HALF TABLET DURING THE DAY IF NEEDED         Please approve or refuse this medication.    Xiomara Gibson MA

## 2021-07-20 VITALS
OXYGEN SATURATION: 95 % | DIASTOLIC BLOOD PRESSURE: 54 MMHG | SYSTOLIC BLOOD PRESSURE: 122 MMHG | TEMPERATURE: 97.9 F | HEART RATE: 77 BPM | RESPIRATION RATE: 18 BRPM

## 2021-07-20 ASSESSMENT — ENCOUNTER SYMPTOMS
DIARRHEA: 0
RESPIRATORY NEGATIVE: 1
ABDOMINAL PAIN: 0
CONSTIPATION: 1
VOMITING: 0
NAUSEA: 0

## 2021-08-30 ENCOUNTER — TELEPHONE (OUTPATIENT)
Dept: PALLATIVE CARE | Age: 86
End: 2021-08-30

## 2021-08-30 RX ORDER — CEFDINIR 300 MG/1
300 CAPSULE ORAL 2 TIMES DAILY
Qty: 14 CAPSULE | Refills: 0 | Status: SHIPPED | OUTPATIENT
Start: 2021-08-30 | End: 2021-09-06

## 2021-08-30 RX ORDER — SENNA AND DOCUSATE SODIUM 50; 8.6 MG/1; MG/1
1 TABLET, FILM COATED ORAL DAILY PRN
Qty: 30 TABLET | Refills: 0 | Status: SHIPPED | OUTPATIENT
Start: 2021-08-30

## 2021-08-30 NOTE — TELEPHONE ENCOUNTER
Arjun Juarez's daughter Adia Carmen, called this morning regarding her possibly having a UTI. She said the symptoms started on Friday. The patient is unable to get out of bed and she is feeling unwell. According to Ms. Sheila Bone, her mother has hasn't drank or eaten anything since Friday. She states that she called the emergency room but did not go because she said they told her they didn't have room and were turning people away.

## 2021-08-30 NOTE — TELEPHONE ENCOUNTER
I spoke with Jessica Mclean, patient's daughter she is concerned that patient has a UTI. She has been sleeping more, complaining of abdominal pain and has darker and stronger smelling urine. She hasn't been eating much at all. She has been drinking a little more today. She gave her Senokot S Thursday due to her going several days without a bowel movement. She did have some results. This was an old prescription. I will send antibiotic to pharmacy. I have asked her to encourage fluids.  Will refill Senokot S.

## 2021-09-09 ENCOUNTER — VIRTUAL VISIT (OUTPATIENT)
Dept: INTERNAL MEDICINE | Age: 86
End: 2021-09-09
Payer: MEDICARE

## 2021-09-09 DIAGNOSIS — I10 ESSENTIAL HYPERTENSION: ICD-10-CM

## 2021-09-09 DIAGNOSIS — F41.9 ANXIETY: ICD-10-CM

## 2021-09-09 DIAGNOSIS — F03.90 DEMENTIA WITHOUT BEHAVIORAL DISTURBANCE, UNSPECIFIED DEMENTIA TYPE: ICD-10-CM

## 2021-09-09 PROBLEM — I95.9 HYPOTENSION: Status: RESOLVED | Noted: 2021-03-02 | Resolved: 2021-09-09

## 2021-09-09 PROCEDURE — 99213 OFFICE O/P EST LOW 20 MIN: CPT | Performed by: NURSE PRACTITIONER

## 2021-09-09 ASSESSMENT — ENCOUNTER SYMPTOMS
CONSTIPATION: 0
STRIDOR: 0
ABDOMINAL PAIN: 0
COUGH: 0
BLOOD IN STOOL: 0
ABDOMINAL DISTENTION: 0
DIARRHEA: 0
VOMITING: 0
WHEEZING: 0
COLOR CHANGE: 0
EYE ITCHING: 0
NAUSEA: 0
SORE THROAT: 0
EYE DISCHARGE: 0
CHOKING: 0
TROUBLE SWALLOWING: 0
SHORTNESS OF BREATH: 0

## 2021-09-09 NOTE — PROGRESS NOTES
[address] [facility]  [address] [address] [place] [address]  Dept: [phone]  Dept Fax: [phone]: [phone]    Andree Murphy (:  1928) is a 80 y.o. female,Established patient, here for evaluation of the following chief complaint(s): Other      Andree Murphy is a 80 y.o. female who were are performing tele health communication  for her medical conditions/complaints as noted below. Currently, our state is under umbrella of national state of emergency and we are using alternative methods of taking care of the needs of our patients so they are not exposed in our office; The patient has consented to this form of communication  Andree Murphy is c/garfield   Other    THE patient is at home  Jody Grissom is at office   Others in attendance are her daughter in law     HPI:     Chief Complaint   Patient presents with    Other     HPI   1. Depression   2. Hypertension   3. Palliative care patient; She is cared fro in her home by her daughter      Past Medical History:   Diagnosis Date    Anxiety     Depression     Hx of blood clots     Hypertension     Palliative care patient 2020      Past Surgical History:   Procedure Laterality Date    APPENDECTOMY      ARM SURGERY      FEMUR FRACTURE SURGERY Right 3/1/2021    FEMUR IM NAIL YAZAN INSERTION Short performed by Lucía Dial MD at [facility], TOTAL ABDOMINAL      LEG SURGERY         Vitals 6/3/2021 2021 4/3/2021 3/24/2021 3/1/2021    SYSTOLIC 736 833 074 288 - -   DIASTOLIC 54 58 53 58 - -   Site - Left Upper Arm - Left Upper Arm - -   Position Supine Supine - Sitting - -   Cuff Size Medium Adult Medium Adult - Medium Adult - -   Pulse 77 78 73 72 - -   Temp 97.9 97.7 98.7 97.9 - -   Resp 18 - 18 18 5 30   SpO2 95 96 92 94 - -   Weight - - 115 lb - - -   Pain Level - - 4 - - -   Some recent data might be hidden       Family History   Family history unknown:  Yes Social History     Tobacco Use    Smoking status: Never Smoker    Smokeless tobacco: Never Used   Substance Use Topics    Alcohol use: Never      Current Outpatient Medications   Medication Sig Dispense Refill    sennosides-docusate sodium (SENOKOT-S) 8.6-50 MG tablet Take 1 tablet by mouth daily as needed for Constipation 30 tablet 0    traZODone (DESYREL) 50 MG tablet Take 1/2 tablet at bedtime 30 tablet 1    aspirin (RENÉE ASPIRIN) 325 MG tablet Take 1 tablet by mouth daily 45 tablet 0    escitalopram (LEXAPRO) 10 MG tablet TAKE 1 TABLET DAILY 90 tablet 1    donepezil (ARICEPT) 10 MG tablet TAKE 1 TABLET NIGHTLY 90 tablet 1    amLODIPine (NORVASC) 5 MG tablet TAKE 1 TABLET DAILY 90 tablet 1    diclofenac sodium (VOLTAREN) 1 % GEL Apply 2 g topically 2 times daily 1 Tube 3     No current facility-administered medications for this visit.      No Known Allergies    Health Maintenance   Topic Date Due    COVID-19 Vaccine (1) Never done    DTaP/Tdap/Td vaccine (1 - Tdap) Never done    Shingles Vaccine (1 of 2) Never done    Flu vaccine (1) Never done    Pneumococcal 65+ years Vaccine (1 of 1 - PPSV23) 07/15/2030 (Originally 11/13/1993)    Annual Wellness Visit (AWV)  12/10/2021    Potassium monitoring  04/03/2022    Creatinine monitoring  04/03/2022    Hepatitis A vaccine  Aged Out    Hepatitis B vaccine  Aged Out    Hib vaccine  Aged Out    Meningococcal (ACWY) vaccine  Aged Out       No results found for: LABA1C  No results found for: PSA, PSADIA  TSH   Date Value Ref Range Status   07/01/2020 2.920 0.270 - 4.200 uIU/mL Final   ]  Lab Results   Component Value Date     (L) 04/03/2021    K 4.1 04/03/2021    CL 97 (L) 04/03/2021    CO2 27 04/03/2021    BUN 17 04/03/2021    CREATININE 0.6 04/03/2021    GLUCOSE 112 (H) 04/03/2021    CALCIUM 9.1 04/03/2021    PROT 7.0 04/03/2021    LABALBU 3.5 04/03/2021    BILITOT 0.4 04/03/2021    ALKPHOS 192 (H) 04/03/2021    AST 23 04/03/2021    ALT 12 04/03/2021    LABGLOM >60 04/03/2021    GFRAA >59 04/03/2021     Lab Results   Component Value Date    CHOL 177 07/01/2020     Lab Results   Component Value Date    TRIG 90 07/01/2020     Lab Results   Component Value Date    HDL 59 (L) 07/01/2020     Lab Results   Component Value Date    LDLCALC 100 07/01/2020     Lab Results   Component Value Date     04/03/2021    K 4.1 04/03/2021    K 4.5 03/04/2021    CL 97 04/03/2021    CO2 27 04/03/2021    BUN 17 04/03/2021    CREATININE 0.6 04/03/2021    GLUCOSE 112 04/03/2021    CALCIUM 9.1 04/03/2021      Lab Results   Component Value Date    WBC 13.6 (H) 04/03/2021    HGB 11.5 (L) 04/03/2021    HCT 36.9 (L) 04/03/2021    .5 (H) 04/03/2021     04/03/2021    LYMPHOPCT 9.9 (L) 04/03/2021    RBC 3.67 (L) 04/03/2021    MCH 31.3 (H) 04/03/2021    MCHC 31.2 (L) 04/03/2021    RDW 14.6 (H) 04/03/2021     Lab Results   Component Value Date    VITD25 31.6 07/01/2020       Subjective:      Review of Systems   Constitutional: Negative for fatigue, fever and unexpected weight change. HENT: Negative for ear discharge, ear pain, mouth sores, sore throat and trouble swallowing. Eyes: Negative for discharge, itching and visual disturbance. Respiratory: Negative for cough, choking, shortness of breath, wheezing and stridor. Cardiovascular: Negative for chest pain, palpitations and leg swelling. Gastrointestinal: Negative for abdominal distention, abdominal pain, blood in stool, constipation, diarrhea, nausea and vomiting. Endocrine: Negative for cold intolerance, polydipsia and polyuria. Genitourinary: Negative for difficulty urinating, dysuria, frequency and urgency. Musculoskeletal: Negative for arthralgias and gait problem. Skin: Negative for color change and rash. Some skin breakdown in the buttock area; Allergic/Immunologic: Negative for food allergies and immunocompromised state.    Neurological: Negative for dizziness, tremors, syncope, speech difficulty, weakness and headaches. Hematological: Negative for adenopathy. Does not bruise/bleed easily. Psychiatric/Behavioral: Negative for confusion and hallucinations. Objective:     Physical Exam   No PE   Vital signs were not taken at this visit as no equipment was available; There were no vitals taken for this visit. Assessment:      Problem List     Anxiety     Lexapro 10 daily          Relevant Medications    escitalopram (LEXAPRO) 10 MG tablet    traZODone (DESYREL) 50 MG tablet    Dementia without behavioral disturbance (HCC)     Aricept 10 mg daily          Relevant Medications    escitalopram (LEXAPRO) 10 MG tablet    donepezil (ARICEPT) 10 MG tablet    traZODone (DESYREL) 50 MG tablet    Essential hypertension     Norvasc 5 mg daily                Plan:        Patient given educational materials - see patient instructions. Discussed use, benefit, and side effects of prescribed medications. Allpatient questions answered. Pt voiced understanding. Reviewed health maintenance. Instructed to continue current medications, diet and exercise. Patient agreed with treatment plan. Follow up as directed. MEDICATIONS:  No orders of the defined types were placed in this encounter. ORDERS:  No orders of the defined types were placed in this encounter. Follow-up:  Return in about 6 months (around 3/9/2022). Following the remote visit today we will call you when we are available to reschedule your follow up appt      PATIENT INSTRUCTIONS:  Patient Instructions   1. Depression stable on Lexapro  2. Hypertension continue same meds no changes are necessary  3.   Palliative care patient cared for in her home by her daughter    Electronically signed by GEOFF Quintana on 9/9/2021 at 11:52 AM  Doxy visit   We are communicating with telehealth for the 3 month fu for controlled substance      Pursuant to the emergency declaration under the [address] and the National Emergencies Act, [facility] authority and the [employer] Act, this Virtual Visit was conducted, with patient's consent, to reduce the patient's risk of exposure to COVID-19 and provide continuity of care for an established patient. EMRDragon/transcription disclaimer:  Much of this encounter note is electronic    Nic Giraldo is a 80 y.o. female being evaluated by a Virtual Visit (video visit) encounter to address concerns as mentioned above. A caregiver was present when appropriate. Due to this being a TeleHealth encounter (During [facility]-10 public health emergency), evaluation of the following organ systems was limited: Vitals/Constitutional/EENT/Resp/CV/GI//MS/Neuro/Skin/Heme-Lymph-Imm. Pursuant to the emergency declaration under the [facility], [address] [place] [address] and the [employer] and [employer] Act, this Virtual Visit was conducted with patient's (and/or legal guardian's) consent, to reduce the patient's risk of exposure to COVID-19 and provide necessary medical care. The patient (and/or legal guardian) has also been advised to contact this office for worsening conditions or problems, and seek emergency medical treatment and/or call 911 if deemed necessary.      Patient identification was verified at the start of the visit: Yes

## 2021-10-11 ENCOUNTER — VIRTUAL VISIT (OUTPATIENT)
Dept: INTERNAL MEDICINE | Age: 86
End: 2021-10-11
Payer: MEDICARE

## 2021-10-11 DIAGNOSIS — S31.000A WOUND OF SACRAL REGION, INITIAL ENCOUNTER: Primary | ICD-10-CM

## 2021-10-11 DIAGNOSIS — R63.0 DECREASED APPETITE: ICD-10-CM

## 2021-10-11 DIAGNOSIS — R13.19 OTHER DYSPHAGIA: ICD-10-CM

## 2021-10-11 PROCEDURE — 99213 OFFICE O/P EST LOW 20 MIN: CPT | Performed by: NURSE PRACTITIONER

## 2021-10-11 ASSESSMENT — ENCOUNTER SYMPTOMS
COUGH: 0
NAUSEA: 0
WHEEZING: 0
COLOR CHANGE: 0
ABDOMINAL DISTENTION: 0
SHORTNESS OF BREATH: 0
CONSTIPATION: 1
DIARRHEA: 0
ABDOMINAL PAIN: 0
TROUBLE SWALLOWING: 1
EYE DISCHARGE: 0
STRIDOR: 0
BLOOD IN STOOL: 0
SORE THROAT: 0
VOMITING: 0
EYE ITCHING: 0
CHOKING: 0

## 2021-10-11 NOTE — PATIENT INSTRUCTIONS
1.  Sacral wound  2. Dysphagia  3. Decreased appetite  4. Constipation which has been an issue before with Darin Tu is also seen her progress with palliative care and we discussed liquid stool getting around formed stool. I have put a referral into palliative care they have seen her in the past.  She can better assess if we need to do hospice or if we need more aggressive care.   The daughter is in agreement to what ever is best for her mom

## 2021-10-11 NOTE — PROGRESS NOTES
[address] [facility]  [address] [address]  [address] [place] [address]  Dept: [phone]  Dept Fax: [phone]: [phone]    Annabel Huerta (:  1928) is a 80 y.o. female,Established patient, here for evaluation of the following chief complaint(s): Abdominal Pain      Annabel Huerta is a 80 y.o. female who were are performing tele health communication  for her medical conditions/complaints as noted below. Currently, our state is under umbrella of national state of emergency and we are using alternative methods of taking care of the needs of our patients so they are not exposed in our office; The patient has consented to this form of communication  Annabel Huerta is c/garfield   Abdominal Pain    THE patient is at home  Fabiano Kidd is at office   Others in attendance are daughter     HPI:     Chief Complaint   Patient presents with    Abdominal Pain     HPI   1.  abd since Saturday   Yesterday was worse;  Normal BM;s her mom is home bound with him hospice home health and palliative care in the past  2. Sacral pressure sores; They daughter  feels they are getting worse;    #3 difficulty swallowing her daughter says she has not had anything to eat more than a cup for the last week. She reports that she has been trying to call home health hospice different people to get in the system but we have not received calls from her today. She also reports that she feels like she is not swallowing correctly we did discuss a possible CVA but at this point she does not want to take her to the hospital if at all possible to avoid.      Past Medical History:   Diagnosis Date    Anxiety     Depression     Hx of blood clots     Hypertension     Palliative care patient 2020      Past Surgical History:   Procedure Laterality Date    APPENDECTOMY      ARM SURGERY      FEMUR FRACTURE SURGERY Right 3/1/2021    FEMUR IM NAIL YAZAN INSERTION Short performed by Young Stevens [facility] at [facility], TOTAL ABDOMINAL      LEG SURGERY         Vitals 6/3/2021 4/28/2021 4/3/2021 3/24/2021 3/1/2021 3/9/5386   SYSTOLIC 961 721 134 340 - -   DIASTOLIC 54 58 53 58 - -   Site - Left Upper Arm - Left Upper Arm - -   Position Supine Supine - Sitting - -   Cuff Size Medium Adult Medium Adult - Medium Adult - -   Pulse 77 78 73 72 - -   Temp 97.9 97.7 98.7 97.9 - -   Resp 18 - 18 18 5 30   SpO2 95 96 92 94 - -   Weight - - 115 lb - - -   Pain Level - - 4 - - -   Some recent data might be hidden       Family History   Family history unknown: Yes       Social History     Tobacco Use    Smoking status: Never Smoker    Smokeless tobacco: Never Used   Substance Use Topics    Alcohol use: Never      Current Outpatient Medications   Medication Sig Dispense Refill    sennosides-docusate sodium (SENOKOT-S) 8.6-50 MG tablet Take 1 tablet by mouth daily as needed for Constipation 30 tablet 0    traZODone (DESYREL) 50 MG tablet Take 1/2 tablet at bedtime 30 tablet 1    aspirin (RENÉE ASPIRIN) 325 MG tablet Take 1 tablet by mouth daily 45 tablet 0    escitalopram (LEXAPRO) 10 MG tablet TAKE 1 TABLET DAILY 90 tablet 1    donepezil (ARICEPT) 10 MG tablet TAKE 1 TABLET NIGHTLY 90 tablet 1    amLODIPine (NORVASC) 5 MG tablet TAKE 1 TABLET DAILY 90 tablet 1    diclofenac sodium (VOLTAREN) 1 % GEL Apply 2 g topically 2 times daily 1 Tube 3     No current facility-administered medications for this visit.      No Known Allergies    Health Maintenance   Topic Date Due    Flu vaccine (1) Never done    DTaP/Tdap/Td vaccine (1 - Tdap) 11/01/2021 (Originally 11/13/1947)    Shingles Vaccine (1 of 2) 10/11/2022 (Originally 11/13/1978)    COVID-19 Vaccine (1) 10/16/2024 (Originally 11/13/1940)    Pneumococcal 65+ years Vaccine (1 of 1 - PPSV23) 07/15/2030 (Originally 11/13/1993)    Annual Wellness Visit (AWV)  12/10/2021    Potassium monitoring  04/03/2022    Creatinine monitoring  04/03/2022    Hepatitis A vaccine  Aged Out    Hepatitis B vaccine  Aged Out    Hib vaccine  Aged Out    Meningococcal (ACWY) vaccine  Aged Out       No results found for: LABA1C  No results found for: PSA, PSADIA  TSH   Date Value Ref Range Status   07/01/2020 2.920 0.270 - 4.200 uIU/mL Final   ]  Lab Results   Component Value Date     (L) 04/03/2021    K 4.1 04/03/2021    CL 97 (L) 04/03/2021    CO2 27 04/03/2021    BUN 17 04/03/2021    CREATININE 0.6 04/03/2021    GLUCOSE 112 (H) 04/03/2021    CALCIUM 9.1 04/03/2021    PROT 7.0 04/03/2021    LABALBU 3.5 04/03/2021    BILITOT 0.4 04/03/2021    ALKPHOS 192 (H) 04/03/2021    AST 23 04/03/2021    ALT 12 04/03/2021    LABGLOM >60 04/03/2021    GFRAA >59 04/03/2021     Lab Results   Component Value Date    CHOL 177 07/01/2020     Lab Results   Component Value Date    TRIG 90 07/01/2020     Lab Results   Component Value Date    HDL 59 (L) 07/01/2020     Lab Results   Component Value Date    LDLCALC 100 07/01/2020     Lab Results   Component Value Date     04/03/2021    K 4.1 04/03/2021    K 4.5 03/04/2021    CL 97 04/03/2021    CO2 27 04/03/2021    BUN 17 04/03/2021    CREATININE 0.6 04/03/2021    GLUCOSE 112 04/03/2021    CALCIUM 9.1 04/03/2021      Lab Results   Component Value Date    WBC 13.6 (H) 04/03/2021    HGB 11.5 (L) 04/03/2021    HCT 36.9 (L) 04/03/2021    .5 (H) 04/03/2021     04/03/2021    LYMPHOPCT 9.9 (L) 04/03/2021    RBC 3.67 (L) 04/03/2021    MCH 31.3 (H) 04/03/2021    MCHC 31.2 (L) 04/03/2021    RDW 14.6 (H) 04/03/2021     Lab Results   Component Value Date    VITD25 31.6 07/01/2020       Subjective:      Review of Systems   Constitutional: Positive for appetite change. Negative for fatigue, fever and unexpected weight change. HENT: Positive for trouble swallowing. Negative for ear discharge, ear pain, mouth sores and sore throat. Eyes: Negative for discharge, itching and visual disturbance.    Respiratory: Negative for cough, choking, shortness of breath, wheezing and stridor. Cardiovascular: Negative for chest pain, palpitations and leg swelling. Gastrointestinal: Positive for constipation. Negative for abdominal distention, abdominal pain, blood in stool, diarrhea, nausea and vomiting. Endocrine: Negative for cold intolerance, polydipsia and polyuria. Genitourinary: Negative for difficulty urinating, dysuria, frequency and urgency. Musculoskeletal: Negative for arthralgias and gait problem. Skin: Negative for color change and rash. Allergic/Immunologic: Negative for food allergies and immunocompromised state. Neurological: Negative for dizziness, tremors, syncope, speech difficulty, weakness and headaches. Hematological: Negative for adenopathy. Does not bruise/bleed easily. Psychiatric/Behavioral: Negative for confusion and hallucinations. Objective:     Physical Exam   No pe done with doxy visit; There were no vitals taken for this visit. Vital signs were not taken at this visit as no equipment was available;      Assessment:      Problem List     Decreased appetite     Likely related to DYSphagia           Relevant Orders    [address] Tinsley 49, APRN, Palliative Care, [place]    Other dysphagia     For the last week          Relevant Orders    [address] Libia Tinsley APRN, Palliative Care, [place]    Sacral wound - Primary    Relevant Orders    [address] Libia Tinsley APRN, Palliative Care, [place]          Plan:        Patient given educational materials - see patient instructions. Discussed use, benefit, and side effects of prescribed medications. Allpatient questions answered. Pt voiced understanding. Reviewed health maintenance. Instructed to continue current medications, diet and exercise. Patient agreed with treatment plan. Follow up as directed. MEDICATIONS:  No orders of the defined types were placed in this encounter.         ORDERS:  Orders Placed This Encounter   Procedures   [phone], GEOFF Lopez, Palliative Care, [place]       Follow-up:  No follow-ups on file. Following the remote visit today we will call you when we are available to reschedule your follow up appt      PATIENT INSTRUCTIONS:  Patient Instructions   1. Sacral wound  2. Dysphagia  3. Decreased appetite  4. Constipation which has been an issue before with López Santoyo is also seen her progress with palliative care and we discussed liquid stool getting around formed stool. I have put a referral into palliative care they have seen her in the past.  She can better assess if we need to do hospice or if we need more aggressive care. The daughter is in agreement to what ever is best for her mom    Electronically signed by GEOFF Tovar on 10/11/2021 at 12:07 PM    Doxy visit 16 minutes  We are communicating with telehealth for the 3 month fu for controlled substance      Pursuant to the emergency declaration under the [address] [place] [address], [facility] waiver authority and the [employer] and [employer] Act, this Virtual Visit was conducted, with patient's consent, to reduce the patient's risk of exposure to COVID-19 and provide continuity of care for an established patient. EMRDragon/transcription disclaimer:  Much of this encounter note is electronic    Fadumo Dias is a 80 y.o. female being evaluated by a Virtual Visit (video visit) encounter to address concerns as mentioned above. A caregiver was present when appropriate. Due to this being a TeleHealth encounter (During [facility]-13 public health emergency), evaluation of the following organ systems was limited: Vitals/Constitutional/EENT/Resp/CV/GI//MS/Neuro/Skin/Heme-Lymph-Imm.   Pursuant to the emergency declaration under the [facility] waiver authority and the [employer] and [employer] Act, this Virtual Visit was conducted with patient's (and/or legal guardian's) consent, to reduce the patient's risk of exposure to COVID-19 and provide necessary medical care. The patient (and/or legal guardian) has also been advised to contact this office for worsening conditions or problems, and seek emergency medical treatment and/or call 911 if deemed necessary.      Patient identification was verified at the start of the visit: Yes

## 2021-10-12 ENCOUNTER — OFFICE VISIT (OUTPATIENT)
Dept: PALLATIVE CARE | Age: 86
End: 2021-10-12
Payer: MEDICARE

## 2021-10-12 ENCOUNTER — TELEPHONE (OUTPATIENT)
Dept: PALLATIVE CARE | Age: 86
End: 2021-10-12

## 2021-10-12 VITALS
OXYGEN SATURATION: 94 % | HEART RATE: 63 BPM | TEMPERATURE: 97.9 F | SYSTOLIC BLOOD PRESSURE: 100 MMHG | DIASTOLIC BLOOD PRESSURE: 62 MMHG

## 2021-10-12 DIAGNOSIS — R13.19 OTHER DYSPHAGIA: ICD-10-CM

## 2021-10-12 DIAGNOSIS — R23.8 SKIN BREAKDOWN: ICD-10-CM

## 2021-10-12 DIAGNOSIS — L89.109 PRESSURE INJURY OF SKIN OF BACK, UNSPECIFIED INJURY STAGE: Primary | ICD-10-CM

## 2021-10-12 DIAGNOSIS — R26.89 UNABLE TO PERFORM BED MOBILITY WITHOUT ASSISTANCE: ICD-10-CM

## 2021-10-12 DIAGNOSIS — S42.301S CLOSED FRACTURE OF RIGHT UPPER EXTREMITY, SEQUELA: ICD-10-CM

## 2021-10-12 DIAGNOSIS — L89.159 PRESSURE INJURY OF SKIN OF SACRAL REGION, UNSPECIFIED INJURY STAGE: ICD-10-CM

## 2021-10-12 DIAGNOSIS — Z51.5 ENCOUNTER FOR PALLIATIVE CARE: ICD-10-CM

## 2021-10-12 DIAGNOSIS — F03.90 DEMENTIA WITHOUT BEHAVIORAL DISTURBANCE, UNSPECIFIED DEMENTIA TYPE: ICD-10-CM

## 2021-10-12 PROCEDURE — 99350 HOME/RES VST EST HIGH MDM 60: CPT | Performed by: NURSE PRACTITIONER

## 2021-10-12 NOTE — TELEPHONE ENCOUNTER
I called José Miguel Villalta to see if it was ok for GEOFF Perkins to see MsZackery Nicely today 10/11 between 2:30 or 3:00. The phone went straight to voicemail.  I left a message to call me back at her earliest convenience

## 2022-04-07 NOTE — PROGRESS NOTES
Leave your dressing in place until your follow up appointment tomorrow, Friday, 4/8. If it falls off that is okay.  Keep your wound area clean and dry until after you Dr. Avila and he gives you the okay to shower.    Thank you for choosing [facility] for your healthcare needs.  It was our pleasure to take care of you today!  Please follow the instructions provided to you after your procedure.    HANDOUTS GIVEN:  · Overview of Your Anesthesia  · Tips for Feeling Better After Your Surgery    If you have any questions or concerns, please call the Outpatient Surgery unit at [phone] Monday through Friday 6 AM to 6 PM. If you are unable to reach someone, please call your physician's office.     You may receive a patient satisfaction survey in the mail or by email following your visit.  Please take the time to complete this, as your feedback is very important to us.  We strive to make your experience exceptional and your comments help us with that goal.  We look forward to hearing from you!   Occupational Therapy  Facility/Department: [facility] SURG SERVICES  Daily Treatment Note  NAME: Ganesh Snyder  : 1928  MRN: [id number]    Date of Service: 3/4/2021    Discharge Recommendations:  Patient would benefit from continued therapy after discharge       Assessment   Assessment: Family training completed for one session with dtr due to pending discharge to home. The patient will need to go home by ambulance and be taken care of as a bed level patient. Training completed primarily emphasized rolling techniques as needed for ADL. Recommend tobias lift for home in addition to her hospital bed. Will need HH to help advance the patient  Treatment Diagnosis: R humerus fx (non-operative), R femur fx s/p pin  REQUIRES OT FOLLOW UP: Yes  Activity Tolerance  Activity Tolerance: Patient limited by pain;Treatment limited secondary to decreased cognition  Safety Devices  Safety Devices in place: Yes  Type of devices: Call light within reach; Bed alarm in place         Patient Diagnosis(es): The primary encounter diagnosis was Fall from standing, initial encounter. Diagnoses of Displaced intertrochanteric fracture of right femur, init ([address] Closed 3-part fracture of proximal end of right humerus, initial encounter, Dementia without behavioral disturbance, unspecified dementia type ([address] Acute renal insufficiency, Anemia, unspecified type, and Vascular dementia without behavioral disturbance ([address] were also pertinent to this visit. has a past medical history of Anxiety, Depression, Hx of blood clots, Hypertension, and Palliative care patient. has a past surgical history that includes Leg Surgery; Arm Surgery; Appendectomy; Hysterectomy, total abdominal; and Femur fracture surgery (Right, 3/1/2021).     Restrictions  Restrictions/Precautions  Restrictions/Precautions: Weight Bearing  Required Braces or Orthoses?: Yes  Lower Extremity Weight Bearing Restrictions  Right Lower Extremity Weight Bearing: Toe Touch Weight Bearing  Upper Extremity Weight Bearing Restrictions  Right Upper Extremity Weight Bearing: Non Weight Bearing  Required Braces or Orthoses  Right Upper Extremity Brace/Splint: Sling  Subjective   General  Chart Reviewed: Yes  Patient assessed for rehabilitation services?: Yes  Additional Pertinent Hx: Lives with daughter  Family / Caregiver Present: No  Diagnosis: R humerus fx. (non-surgical), R femur fx. s/p femoral pin  General Comment  Comments: Daughter planning on taking care of patient at home and has arrived for family training. Pain Assessment  Pain Assessment: 0-10  Pain Level: 4  Pain Location: Arm;Leg  Pain Orientation: Right  Pain Descriptors: Aching  Functional Pain Assessment: Prevents or interferes with all active and some passive activities  Non-Pharmaceutical Pain Intervention(s): Ambulation/Increased Activity; Elevation;Repositioned  Response to Pain Intervention: Patient Satisfied  Vital Signs  Patient Currently in Pain: Yes   Orientation     Objective    ADL  Additional Comments: Family training completed for dressing/bathing/toileting as a bed level patient. Procedures for completing a full roll to side on left, using a pillow between knees and then tipping to the right to complete the task at hand. Raising bed height, use of bed pan were included in the training. Bed mobility  Rolling to Left: Maximum assistance  Comment: tipping to the right (partial roll) with min A  Transfers  Transfer Comments: Explained to family that tobias lift was recommended at the patient's current level of function.   They will need training in this from [address]: Patient is perseverative at times, needs full supervision           Positioning  Bed Postion Comment: positioning in sidelying and off loading heels addressed        Type of ROM/Therapeutic Exercise  Comment: Reviewed distal RUE exercises and trained patient and dtr in don/doff of splint Plan   Plan  Times per week: 3-5x/week  Times per day: Daily  G-Code     OutComes Score                                                  AM-PAC Score             Goals  Short term goals  Time Frame for Short term goals: 1 week  Short term goal 1: [place] with sliding tfers from bed to recliner or drop arm commode  Short term goal 2: Sit-stand with Mod A of 2  Short term goal 3: Mod A with supine to sit EOB  Long term goals  Long term goal 1: Return to PLOF       Therapy Time   Individual Concurrent Group Co-treatment   Time In           Time Out           Minutes  [address] [facility] Lorie Palmer OT Electronically signed by Addi Mcdaniel OT on 3/4/2021 at 4:13 PM

## 2024-01-24 NOTE — PROGRESS NOTES
Physical Therapy  Patria Mcburney  [id number]     03/04/21 1411   Subjective   Subjective Patient up in chair and ready to go back to bed. Pain Assessment   Pain Assessment Faces   Pain Type Acute pain   Pain Location Hip; Arm   Pain Orientation Right   Non-Pharmaceutical Pain Intervention(s) Repositioned; Rest   Aguilar-Shaw Pain Rating 8   Bed Mobility   Sit to Supine Maximal assistance;Dependent/Total  (x2)   Transfers   Sit to Stand Dependent/Total;2 Person Assistance   Stand to sit Dependent/Total;2 Person Assistance   Bed to Chair Dependent/Total;2 Person Assistance  (chair to bed)   Stand Pivot Transfers Dependent/Total;2 Person Assistance   Comment Patient transferred chair to bed. Requires therapist to weight shift patient off R LE onto L LE secondary WB status and patient unable to follow commands or maintain TTWB on R LE   Ambulation   Ambulation? No   WB Status NWB RUE, TTWB RLE   Other Activities   Comment Patient very anxious and unable to follow commands. Patient requires constant v/c's and t/c's to weight shift off on R LE to pivot. Patient returned to supine, PCA present to assist patient with clean up, all needs in reach. Short term goals   Time Frame for Short term goals 2 wks   Short term goal 1 supine to sit indep   Short term goal 2 sit to stand indep   Short term goal 3 bed to drop arm chair Min A   Activity Tolerance   Activity Tolerance Patient limited by cognitive status   Activity Tolerance limited by dementia, fear of pain   Safety Devices   Type of devices Bed alarm in place;Call light within reach; Left in bed   Electronically signed by Van Garcia PTA on 3/4/2021 at 2:20 PM Detail Level: Detailed Detail Level: Zone

## (undated) DEVICE — TUBE ET 7MM NSL ORAL BASIC CUF INTMED MURPHY EYE RADPQ MRK

## (undated) DEVICE — C-ARMOR C-ARM EQUIPMENT COVERS CLEAR STERILE UNIVERSAL FIT 12 PER CASE: Brand: C-ARMOR

## (undated) DEVICE — SUTURE VCRL SZ 2-0 L36IN ABSRB UD L36MM CT-1 1/2 CIR J945H

## (undated) DEVICE — 6617 IOBAN II PATIENT ISOLATION DRAPE 5/BX,4BX/CS: Brand: STERI-DRAPE™ IOBAN™ 2

## (undated) DEVICE — SURGICAL PROCEDURE PACK LOWER EXTREMITY LOURDES HOSP

## (undated) DEVICE — BIT DRL L300MM DIA10MM CANN TAPR L QUIK CPL FOR DH DC TFN

## (undated) DEVICE — BIT DRL L330MM DIA4.2MM CALIB 100MM 3 FLUT QUIK CPL

## (undated) DEVICE — BLADE LARYNSCP HNDL MAC 3 DISP CURAVIEW LED

## (undated) DEVICE — Z DISCONTINUED USE 2272117 DRAPE SURG 3 QTR N INVASIVE 2 LAYR DISP

## (undated) DEVICE — SOLUTION IV IRRIG POUR BRL 0.9% SODIUM CHL 2F7124

## (undated) DEVICE — DRAPE,U/ SHT,SPLIT,PLAS,STERIL: Brand: MEDLINE

## (undated) DEVICE — Z DISCONTINUED USE 2429233 DRESSING FOAM W10XL10CM 5 LAYR SELF ADH VERSATILE SAFETAC

## (undated) DEVICE — GLOVE SURG SZ 9 L12IN FNGR THK13MIL BRN LTX SYN POLYMER W

## (undated) DEVICE — GUIDEWIRE ORTH L400MM DIA3.2MM FOR TFN

## (undated) DEVICE — SUTURE VCRL SZ 3-0 L27IN ABSRB UD L26MM SH 1/2 CIR J416H

## (undated) DEVICE — C-ARM: Brand: UNBRANDED

## (undated) DEVICE — Z DUP USE 2648250 IMMOBILIZER ORTH L SHLDR BILAT UNISX COT ADJ CLS EL OPN HND

## (undated) DEVICE — GLOVE SURG SZ 85 L12IN FNGR THK94MIL TRNSLUC YEL LTX

## (undated) DEVICE — WRAP AROUND LENS-STERLIE